# Patient Record
Sex: FEMALE | Race: WHITE | Employment: OTHER | URBAN - METROPOLITAN AREA
[De-identification: names, ages, dates, MRNs, and addresses within clinical notes are randomized per-mention and may not be internally consistent; named-entity substitution may affect disease eponyms.]

---

## 2018-08-21 ENCOUNTER — HOSPITAL ENCOUNTER (OUTPATIENT)
Age: 63
Discharge: HOME | End: 2018-08-21
Payer: COMMERCIAL

## 2018-08-21 LAB
A/G RATIO: 1.9 (ref 1.1–2.2)
ALBUMIN SERPL-MCNC: 4.3 G/DL (ref 3.4–5)
ALP BLD-CCNC: 92 U/L (ref 40–129)
ALT SERPL-CCNC: 11 U/L (ref 10–40)
ANION GAP SERPL CALCULATED.3IONS-SCNC: 13 MMOL/L (ref 3–16)
AST SERPL-CCNC: 17 U/L (ref 15–37)
BASOPHILS ABSOLUTE: 0 K/UL (ref 0–0.2)
BASOPHILS RELATIVE PERCENT: 0.5 %
BILIRUB SERPL-MCNC: <0.2 MG/DL (ref 0–1)
BUN BLDV-MCNC: 7 MG/DL (ref 7–20)
CALCIUM SERPL-MCNC: 9.3 MG/DL (ref 8.3–10.6)
CHLORIDE BLD-SCNC: 99 MMOL/L (ref 99–110)
CHOLESTEROL, TOTAL: 152 MG/DL (ref 0–199)
CO2: 25 MMOL/L (ref 21–32)
CREAT SERPL-MCNC: 0.7 MG/DL (ref 0.6–1.2)
EOSINOPHILS ABSOLUTE: 0.1 K/UL (ref 0–0.6)
EOSINOPHILS RELATIVE PERCENT: 1.2 %
GFR AFRICAN AMERICAN: >60
GFR NON-AFRICAN AMERICAN: >60
GLOBULIN: 2.3 G/DL
GLUCOSE BLD-MCNC: 98 MG/DL (ref 70–99)
HCT VFR BLD CALC: 37.9 % (ref 36–48)
HDLC SERPL-MCNC: 81 MG/DL (ref 40–60)
HEMOGLOBIN: 12.7 G/DL (ref 12–16)
LDL CHOLESTEROL CALCULATED: 52 MG/DL
LYMPHOCYTES ABSOLUTE: 1.7 K/UL (ref 1–5.1)
LYMPHOCYTES RELATIVE PERCENT: 21.9 %
MCH RBC QN AUTO: 29.7 PG (ref 26–34)
MCHC RBC AUTO-ENTMCNC: 33.5 G/DL (ref 31–36)
MCV RBC AUTO: 88.7 FL (ref 80–100)
MONOCYTES ABSOLUTE: 0.7 K/UL (ref 0–1.3)
MONOCYTES RELATIVE PERCENT: 9.6 %
NEUTROPHILS ABSOLUTE: 5.2 K/UL (ref 1.7–7.7)
NEUTROPHILS RELATIVE PERCENT: 66.8 %
PDW BLD-RTO: 15 % (ref 12.4–15.4)
PLATELET # BLD: 249 K/UL (ref 135–450)
PMV BLD AUTO: 8 FL (ref 5–10.5)
POTASSIUM SERPL-SCNC: 4.7 MMOL/L (ref 3.5–5.1)
RBC # BLD: 4.27 M/UL (ref 4–5.2)
SODIUM BLD-SCNC: 137 MMOL/L (ref 136–145)
T4 FREE: 1.1 NG/DL (ref 0.9–1.8)
TOTAL PROTEIN: 6.6 G/DL (ref 6.4–8.2)
TRIGL SERPL-MCNC: 93 MG/DL (ref 0–150)
TSH SERPL DL<=0.05 MIU/L-ACNC: 2.81 UIU/ML (ref 0.27–4.2)
VITAMIN D 25-HYDROXY: 24.8 NG/ML
VLDLC SERPL CALC-MCNC: 19 MG/DL
WBC # BLD: 7.7 K/UL (ref 4–11)

## 2018-08-21 PROCEDURE — 85025 COMPLETE CBC W/AUTO DIFF WBC: CPT

## 2018-08-21 PROCEDURE — 84443 ASSAY THYROID STIM HORMONE: CPT

## 2018-08-21 PROCEDURE — 82306 VITAMIN D 25 HYDROXY: CPT

## 2018-08-21 PROCEDURE — 80061 LIPID PANEL: CPT

## 2018-08-21 PROCEDURE — 84439 ASSAY OF FREE THYROXINE: CPT

## 2018-08-21 PROCEDURE — 83036 HEMOGLOBIN GLYCOSYLATED A1C: CPT

## 2018-08-21 PROCEDURE — 36415 COLL VENOUS BLD VENIPUNCTURE: CPT

## 2018-08-21 PROCEDURE — 80053 COMPREHEN METABOLIC PANEL: CPT

## 2018-08-22 LAB
ESTIMATED AVERAGE GLUCOSE: 114 MG/DL
HBA1C MFR BLD: 5.6 %

## 2018-09-01 ENCOUNTER — HOSPITAL ENCOUNTER (EMERGENCY)
Age: 63
Discharge: HOME | End: 2018-09-01
Attending: EMERGENCY MEDICINE
Payer: COMMERCIAL

## 2018-09-01 ENCOUNTER — APPOINTMENT (OUTPATIENT)
Dept: CT IMAGING | Age: 63
End: 2018-09-01
Payer: COMMERCIAL

## 2018-09-01 VITALS
BODY MASS INDEX: 19.16 KG/M2 | WEIGHT: 115 LBS | DIASTOLIC BLOOD PRESSURE: 70 MMHG | SYSTOLIC BLOOD PRESSURE: 140 MMHG | HEIGHT: 65 IN | TEMPERATURE: 98 F | OXYGEN SATURATION: 94 % | HEART RATE: 85 BPM | RESPIRATION RATE: 16 BRPM

## 2018-09-01 DIAGNOSIS — J44.1 COPD EXACERBATION (HCC): Primary | ICD-10-CM

## 2018-09-01 LAB
ANION GAP SERPL CALCULATED.3IONS-SCNC: 10 MMOL/L (ref 3–16)
BASOPHILS ABSOLUTE: 0.1 K/UL (ref 0–0.2)
BASOPHILS RELATIVE PERCENT: 1.4 %
BUN BLDV-MCNC: 9 MG/DL (ref 7–20)
CALCIUM SERPL-MCNC: 9 MG/DL (ref 8.3–10.6)
CHLORIDE BLD-SCNC: 99 MMOL/L (ref 99–110)
CO2: 28 MMOL/L (ref 21–32)
CREAT SERPL-MCNC: <0.5 MG/DL (ref 0.6–1.2)
EOSINOPHILS ABSOLUTE: 0.3 K/UL (ref 0–0.6)
EOSINOPHILS RELATIVE PERCENT: 3 %
GFR AFRICAN AMERICAN: >60
GFR NON-AFRICAN AMERICAN: >60
GLUCOSE BLD-MCNC: 103 MG/DL (ref 70–99)
HCT VFR BLD CALC: 39.9 % (ref 36–48)
HEMOGLOBIN: 13.5 G/DL (ref 12–16)
LYMPHOCYTES ABSOLUTE: 1.4 K/UL (ref 1–5.1)
LYMPHOCYTES RELATIVE PERCENT: 14.8 %
MAGNESIUM: 2.4 MG/DL (ref 1.8–2.4)
MCH RBC QN AUTO: 29.5 PG (ref 26–34)
MCHC RBC AUTO-ENTMCNC: 33.7 G/DL (ref 31–36)
MCV RBC AUTO: 87.7 FL (ref 80–100)
MONOCYTES ABSOLUTE: 0.7 K/UL (ref 0–1.3)
MONOCYTES RELATIVE PERCENT: 7.2 %
NEUTROPHILS ABSOLUTE: 6.8 K/UL (ref 1.7–7.7)
NEUTROPHILS RELATIVE PERCENT: 73.6 %
PDW BLD-RTO: 15.2 % (ref 12.4–15.4)
PLATELET # BLD: 271 K/UL (ref 135–450)
PMV BLD AUTO: 7 FL (ref 5–10.5)
POTASSIUM SERPL-SCNC: 5.1 MMOL/L (ref 3.5–5.1)
RBC # BLD: 4.56 M/UL (ref 4–5.2)
SODIUM BLD-SCNC: 137 MMOL/L (ref 136–145)
TOTAL CK: 83 U/L (ref 26–192)
TROPONIN: <0.01 NG/ML
WBC # BLD: 9.2 K/UL (ref 4–11)

## 2018-09-01 PROCEDURE — 99285 EMERGENCY DEPT VISIT HI MDM: CPT

## 2018-09-01 PROCEDURE — 93010 ELECTROCARDIOGRAM REPORT: CPT | Performed by: INTERNAL MEDICINE

## 2018-09-01 PROCEDURE — 6360000004 HC RX CONTRAST MEDICATION: Performed by: EMERGENCY MEDICINE

## 2018-09-01 PROCEDURE — 93005 ELECTROCARDIOGRAM TRACING: CPT | Performed by: EMERGENCY MEDICINE

## 2018-09-01 PROCEDURE — 80048 BASIC METABOLIC PNL TOTAL CA: CPT

## 2018-09-01 PROCEDURE — 82550 ASSAY OF CK (CPK): CPT

## 2018-09-01 PROCEDURE — 71260 CT THORAX DX C+: CPT

## 2018-09-01 PROCEDURE — 84484 ASSAY OF TROPONIN QUANT: CPT

## 2018-09-01 PROCEDURE — 83735 ASSAY OF MAGNESIUM: CPT

## 2018-09-01 PROCEDURE — 85025 COMPLETE CBC W/AUTO DIFF WBC: CPT

## 2018-09-01 RX ORDER — AZITHROMYCIN 250 MG/1
TABLET, FILM COATED ORAL
Qty: 1 PACKET | Refills: 0 | Status: SHIPPED | OUTPATIENT
Start: 2018-09-01 | End: 2018-09-11

## 2018-09-01 RX ORDER — ALBUTEROL SULFATE 90 UG/1
AEROSOL, METERED RESPIRATORY (INHALATION)
Qty: 1 INHALER | Refills: 0 | Status: SHIPPED | OUTPATIENT
Start: 2018-09-01

## 2018-09-01 RX ORDER — GABAPENTIN 300 MG/1
600 CAPSULE ORAL 3 TIMES DAILY
COMMUNITY

## 2018-09-01 RX ORDER — ESCITALOPRAM OXALATE 5 MG/1
5 TABLET ORAL EVERY 12 HOURS
Status: ON HOLD | COMMUNITY
End: 2020-07-06

## 2018-09-01 RX ORDER — MORPHINE SULFATE 30 MG/1
30 TABLET ORAL 2 TIMES DAILY
Status: ON HOLD | COMMUNITY
End: 2020-07-05

## 2018-09-01 RX ORDER — GUAIFENESIN 600 MG/1
1200 TABLET, EXTENDED RELEASE ORAL 2 TIMES DAILY
Qty: 20 TABLET | Refills: 0 | Status: SHIPPED | OUTPATIENT
Start: 2018-09-01 | End: 2018-09-06

## 2018-09-01 RX ADMIN — IOPAMIDOL 85 ML: 755 INJECTION, SOLUTION INTRAVENOUS at 09:19

## 2018-09-01 NOTE — ED PROVIDER NOTES
or more for level 5)     Review of Systems    Positives and Pertinent negatives as per HPI. Except as noted above in the ROS, all other systems were reviewed and negative. PAST MEDICAL HISTORY     Past Medical History:   Diagnosis Date    COPD (chronic obstructive pulmonary disease) ([address]     DDD (degenerative disc disease)     Fibromyalgia     Sciatica          SURGICAL HISTORY       Past Surgical History:   Procedure Laterality Date    CHOLECYSTECTOMY      HAND SURGERY      HYSTERECTOMY      KNEE SURGERY      TONSILLECTOMY           CURRENT MEDICATIONS       Previous Medications    ATORVASTATIN (LIPITOR) 40 MG TABLET    Take 40 mg by mouth daily. BACLOFEN (LIORESAL) 10 MG TABLET    Take 10 mg by mouth 3 times daily. DIAZEPAM (VALIUM) 10 MG TABLET    Take 10 mg by mouth every 6 hours as needed for Anxiety. ESCITALOPRAM (LEXAPRO) 5 MG TABLET    Take 10 mg by mouth daily    ESOMEPRAZOLE MAGNESIUM (NEXIUM) 40 MG PACK    Take 40 mg by mouth daily. ESTROPIPATE (OGEN) 1.5 MG TABLET    Take 1.5 mg by mouth daily. FLUTICASONE-SALMETEROL (ADVAIR) 250-50 MCG/DOSE AEPB    Inhale 1 puff into the lungs every 12 hours. GABAPENTIN (NEURONTIN) 300 MG CAPSULE    Take 300 mg by mouth 2 times daily. .    MORPHINE (MSIR) 30 MG TABLET    Take 30 mg by mouth 2 times daily. .    OXYCODONE 5 MG CAPSULE    Take 5 mg by mouth every 6 hours as needed for Pain. PROMETHAZINE (PHENERGAN) 25 MG TABLET    Take 25 mg by mouth every 6 hours as needed for Nausea. ZAFIRLUKAST (ACCOLATE) 20 MG TABLET    Take 20 mg by mouth 2 times daily. ALLERGIES     Patient has no known allergies. FAMILY HISTORY     History reviewed. No pertinent family history.        SOCIAL HISTORY       Social History     Social History    Marital status: Single     Spouse name: N/A    Number of children: N/A    Years of education: N/A     Social History Main Topics    Smoking status: Never Smoker    Smokeless tobacco: Never Used  Alcohol use No    Drug use: No    Sexual activity: Not Asked     Other Topics Concern    None     Social History Narrative    None       SCREENINGS             PHYSICAL EXAM    (up to 7 for level 4, 8 or more for level 5)     ED Triage Vitals [09/01/18 0806]   BP Temp Temp src Pulse Resp SpO2 Height Weight   138/81 98 °F (36.7 °C) -- 85 20 93 % 5' 5\" (1.651 m) 115 lb (52.2 kg)       Physical Exam       General Appearance:  Alert, cooperative, no distress, non-toxic, Thin, frail appearing, does have dysphonia due to a sore throat    Head:  Normocephalic, without obvious abnormality, atraumatic,    Eyes:  conjunctiva/corneas clear, EOM's intact. Sclera anicteric. ENT: Mucous membranes moist.  No stridor. There is no oropharyngeal erythema, exudate or lesion. Clear mucoid discharge coming out of both nares. Neck: Supple, symmetrical, trachea midline, no adenopathy. No rigidity   Lungs:   No Respiratory Distress, But does have some conversational dyspnea. Markedly decreased breath sounds noted bilaterally, no crackles, wheezing or rhonchi at this time    Chest Wall:  symmetrical   Heart:  S1 and S2 regular rate and rhythm without murmur, rub or gallop    Abdomen:   Soft nontender bowel sounds are present in all quadrants    Extremities:  Full range of motion. No pain noted over any major bone or joint, no edema or cyanosis or clubbing. No calf tenderness    Pulses: Equal and symmetric    Skin:  No rashes or lesions to exposed skin.   Cap refill normal   Neurologic: Alert and oriented X 3.   CN 2-12 intact, strength and sensation symmetrical, reflexes intact bilaterally, no focal deficits, cerebellar function intact       DIAGNOSTIC RESULTS   LABS:    Labs Reviewed   BASIC METABOLIC PANEL - Abnormal; Notable for the following:        Result Value    Glucose 103 (*)     CREATININE <0.5 (*)     All other components within normal limits    Narrative:     Performed at:  [facility] exacerbation [facility])          DISPOSITION/PLAN   DISPOSITION Decision To Discharge 09/01/2018 09:50:32 AM      PATIENT REFERRED TO:  Lisa Jordan MD  [facility]  [address] [facility] [place] [id number] [phone]    Schedule an appointment as soon as possible for a visit       [facility] ED  [address] [facility]  [phone]  In 1 day  As needed, If symptoms worsen      DISCHARGE MEDICATIONS:  New Prescriptions    ALBUTEROL SULFATE  (90 BASE) MCG/ACT INHALER    Use 2 puffs 4 times daily for 7 days then as needed for wheezing. Dispense with Spacer and instruct in use. At patient's preference may use 60 dose MDI. May Sub Pro-Air or Proventil as needed per insurance. AZITHROMYCIN (ZITHROMAX) 250 MG TABLET    Take 2 tablets (500 mg) on Day 1, followed by 1 tablet (250 mg) once daily on Days 2 through 5. GUAIFENESIN (MUCINEX) 600 MG EXTENDED RELEASE TABLET    Take 2 tablets by mouth 2 times daily for 5 days       DISCONTINUED MEDICATIONS:  Discontinued Medications    HYDROCODONE-ACETAMINOPHEN  MG TABS    Take 1 tablet by mouth 3 times daily. METHOCARBAMOL (ROBAXIN) 750 MG TABLET    Take 750 mg by mouth 3 times daily. TOPIRAMATE (TOPAMAX) 100 MG TABLET    Take 100 mg by mouth 2 times daily.               (Please note that portions of this note were completed with a voice recognition program.  Efforts were made to edit the dictations but occasionally words are mis-transcribed.)    Karma Wright DO (electronically signed)            Mitch Ruano DO  09/01/18 0003

## 2018-09-05 LAB
EKG ATRIAL RATE: 75 BPM
EKG DIAGNOSIS: NORMAL
EKG P AXIS: 78 DEGREES
EKG P-R INTERVAL: 120 MS
EKG Q-T INTERVAL: 392 MS
EKG QRS DURATION: 82 MS
EKG QTC CALCULATION (BAZETT): 437 MS
EKG R AXIS: 85 DEGREES
EKG T AXIS: 77 DEGREES
EKG VENTRICULAR RATE: 75 BPM

## 2018-09-20 ENCOUNTER — HOSPITAL ENCOUNTER (OUTPATIENT)
Dept: NON INVASIVE DIAGNOSTICS | Age: 63
Discharge: HOME | End: 2018-09-20
Payer: COMMERCIAL

## 2018-09-20 ENCOUNTER — HOSPITAL ENCOUNTER (OUTPATIENT)
Age: 63
Discharge: HOME | End: 2018-09-20
Payer: COMMERCIAL

## 2018-09-20 LAB
C-REACTIVE PROTEIN: 0.9 MG/L (ref 0–5.1)
EKG ATRIAL RATE: 65 BPM
EKG DIAGNOSIS: NORMAL
EKG P AXIS: 73 DEGREES
EKG P-R INTERVAL: 130 MS
EKG Q-T INTERVAL: 402 MS
EKG QRS DURATION: 96 MS
EKG QTC CALCULATION (BAZETT): 418 MS
EKG R AXIS: 80 DEGREES
EKG T AXIS: 72 DEGREES
EKG VENTRICULAR RATE: 65 BPM
LV EF: 55 %
LVEF MODALITY: NORMAL
RHEUMATOID FACTOR: <10 IU/ML
SEDIMENTATION RATE, ERYTHROCYTE: 10 MM/HR (ref 0–30)

## 2018-09-20 PROCEDURE — 85652 RBC SED RATE AUTOMATED: CPT

## 2018-09-20 PROCEDURE — 86200 CCP ANTIBODY: CPT

## 2018-09-20 PROCEDURE — 93005 ELECTROCARDIOGRAM TRACING: CPT | Performed by: INTERNAL MEDICINE

## 2018-09-20 PROCEDURE — 93306 TTE W/DOPPLER COMPLETE: CPT

## 2018-09-20 PROCEDURE — 86140 C-REACTIVE PROTEIN: CPT

## 2018-09-20 PROCEDURE — 93010 ELECTROCARDIOGRAM REPORT: CPT | Performed by: INTERNAL MEDICINE

## 2018-09-20 PROCEDURE — 83516 IMMUNOASSAY NONANTIBODY: CPT

## 2018-09-20 PROCEDURE — 86431 RHEUMATOID FACTOR QUANT: CPT

## 2018-09-20 PROCEDURE — 36415 COLL VENOUS BLD VENIPUNCTURE: CPT

## 2018-09-20 PROCEDURE — 86038 ANTINUCLEAR ANTIBODIES: CPT

## 2018-09-21 LAB
ANA INTERPRETATION: NORMAL
ANTI-NUCLEAR ANTIBODY (ANA): NEGATIVE

## 2018-09-22 LAB — CCP IGG ANTIBODIES: 5 UNITS (ref 0–19)

## 2018-09-23 LAB
MYELOPEROXIDASE AB: 0 AU/ML (ref 0–19)
SERINE PROTEASE 3 AB: 5 AU/ML (ref 0–19)

## 2018-11-18 ENCOUNTER — HOSPITAL ENCOUNTER (EMERGENCY)
Age: 63
Discharge: HOME | End: 2018-11-18
Attending: EMERGENCY MEDICINE
Payer: COMMERCIAL

## 2018-11-18 VITALS
SYSTOLIC BLOOD PRESSURE: 155 MMHG | TEMPERATURE: 98 F | OXYGEN SATURATION: 95 % | BODY MASS INDEX: 19.16 KG/M2 | HEART RATE: 77 BPM | WEIGHT: 115 LBS | HEIGHT: 65 IN | RESPIRATION RATE: 14 BRPM | DIASTOLIC BLOOD PRESSURE: 94 MMHG

## 2018-11-18 DIAGNOSIS — G89.29 CHRONIC PAIN OF BOTH KNEES: Primary | ICD-10-CM

## 2018-11-18 DIAGNOSIS — M25.562 CHRONIC PAIN OF BOTH KNEES: Primary | ICD-10-CM

## 2018-11-18 DIAGNOSIS — M25.561 CHRONIC PAIN OF BOTH KNEES: Primary | ICD-10-CM

## 2018-11-18 PROCEDURE — 96372 THER/PROPH/DIAG INJ SC/IM: CPT

## 2018-11-18 PROCEDURE — 6360000002 HC RX W HCPCS: Performed by: EMERGENCY MEDICINE

## 2018-11-18 PROCEDURE — 99283 EMERGENCY DEPT VISIT LOW MDM: CPT

## 2018-11-18 RX ORDER — HYDROMORPHONE HCL 110MG/55ML
2 PATIENT CONTROLLED ANALGESIA SYRINGE INTRAVENOUS ONCE
Status: COMPLETED | OUTPATIENT
Start: 2018-11-18 | End: 2018-11-18

## 2018-11-18 RX ADMIN — HYDROMORPHONE HYDROCHLORIDE 2 MG: 2 INJECTION INTRAMUSCULAR; INTRAVENOUS; SUBCUTANEOUS at 04:06

## 2018-11-18 ASSESSMENT — PAIN SCALES - GENERAL
PAINLEVEL_OUTOF10: 2
PAINLEVEL_OUTOF10: 10

## 2018-11-18 ASSESSMENT — PAIN DESCRIPTION - LOCATION: LOCATION: KNEE

## 2018-11-18 ASSESSMENT — PAIN DESCRIPTION - ORIENTATION: ORIENTATION: RIGHT;LEFT

## 2018-11-18 NOTE — ED PROVIDER NOTES
[address] ED  eMERGENCY dEPARTMENT eNCOUnter        Pt Name: Brooklyn Ang  MRN: [id number]  [place] 1955  Date of evaluation: 11/18/2018  Provider: Kiet Kebede DO  PCP: Davidson Dejesus MD      [address] [place] [address]       Chief Complaint   Patient presents with    Knee Pain     pain in bilateral knees after steroid epidural injection. HISTORY OF PRESENT ILLNESS   (Location/Symptom, Timing/Onset, Context/Setting, Quality, Duration, Modifying Factors, Severity)  Note limiting factors. Brooklyn Ang is a 65 y.o. female  presents to the emergency department with complaint of Acute exacerbation of bilateral chronic knee pain. Patient has a history of bilateral knee replacement due to distant trauma. She has not had any fever or chills. She denies any redness or swelling. She has been able ambulate. The pain started acutely at around midnight and is nonradiating. She has been compliant with her chronic opiate therapy which she has been on for the past approximately 6 months. There is not been a recent change in medication. She did have recent epidural injection. She denies any significant back pain or pain in her buttocks. She has had no urinary retention problems with bowel. Nursing Notes were all reviewed and agreed with or any disagreements were addressed  in the HPI.     REVIEW OF SYSTEMS    (2-9 systems for level 4, 10 or more for level 5)     Review of Systems  REVIEW OF SYSTEMS    Constitutional:  Denies fever, chills, weight loss or weakness   Eyes:  Denies photophobia or discharge   HENT:  Denies sore throat or ear pain   Respiratory:  Denies cough or shortness of breath   Cardiovascular:  Denies chest pain, palpitations or swelling   GI:  Denies abdominal pain, nausea, vomiting, or diarrhea   Musculoskeletal:  Denies back pain   Skin:  Denies rash   Neurologic:  Denies headache, focal weakness or sensory changes   Endocrine:  Denies polyuria surgeon for further evaluation of the joint replacements. She was ambulatory in the ED. She had full range of motion. There was no evidence of arterial or venous insufficiency on exam.  There was no back pain or concerning sciatic symptoms. Patient was given the following medications:  Medications   HYDROmorphone (DILAUDID) injection 2 mg (2 mg Intramuscular Given 11/18/18 0406)       The patient tolerated their visit well. The patient and / or the family were informed of the results of any tests, a time was given to answer questions. FINAL IMPRESSION      1.  Chronic pain of both knees          DISPOSITION/PLAN   DISPOSITION Decision To Discharge 11/18/2018 04:15:46 AM      PATIENT REFERRED TO:  Gene Huang MD  [address]  [address] [facility] [address]  [phone]      As needed      DISCHARGE MEDICATIONS:  Current Discharge Medication List          DISCONTINUED MEDICATIONS:  Current Discharge Medication List                 (Please note that portions of this note were completed with a voice recognition program.  Efforts were made to edit the dictations but occasionally words are mis-transcribed.)    Zulema Carrillo DO (electronically signed)           Zulema Carrillo DO  11/18/18 5422

## 2019-02-23 ENCOUNTER — HOSPITAL ENCOUNTER (OUTPATIENT)
Dept: MRI IMAGING | Age: 64
Discharge: HOME | End: 2019-02-23
Payer: MEDICARE

## 2019-02-23 ENCOUNTER — HOSPITAL ENCOUNTER (OUTPATIENT)
Age: 64
Discharge: HOME | End: 2019-02-23
Payer: MEDICARE

## 2019-02-23 ENCOUNTER — HOSPITAL ENCOUNTER (OUTPATIENT)
Dept: GENERAL RADIOLOGY | Age: 64
Discharge: HOME | End: 2019-02-23
Payer: MEDICARE

## 2019-02-23 DIAGNOSIS — M47.817 SPONDYLOSIS WITHOUT MYELOPATHY OR RADICULOPATHY, LUMBOSACRAL REGION: ICD-10-CM

## 2019-02-23 DIAGNOSIS — M16.0 OSTEOARTHRITIS OF BOTH HIPS, UNSPECIFIED OSTEOARTHRITIS TYPE: ICD-10-CM

## 2019-02-23 PROCEDURE — 72148 MRI LUMBAR SPINE W/O DYE: CPT

## 2019-02-23 PROCEDURE — 73521 X-RAY EXAM HIPS BI 2 VIEWS: CPT

## 2019-04-08 ENCOUNTER — HOSPITAL ENCOUNTER (EMERGENCY)
Age: 64
Discharge: HOME | End: 2019-04-08
Payer: MEDICARE

## 2019-04-08 ENCOUNTER — APPOINTMENT (OUTPATIENT)
Dept: GENERAL RADIOLOGY | Age: 64
End: 2019-04-08
Payer: MEDICARE

## 2019-04-08 VITALS
RESPIRATION RATE: 20 BRPM | OXYGEN SATURATION: 94 % | HEIGHT: 65 IN | TEMPERATURE: 97.9 F | SYSTOLIC BLOOD PRESSURE: 119 MMHG | DIASTOLIC BLOOD PRESSURE: 80 MMHG | BODY MASS INDEX: 21.33 KG/M2 | HEART RATE: 78 BPM | WEIGHT: 128 LBS

## 2019-04-08 DIAGNOSIS — R13.10 DYSPHAGIA, UNSPECIFIED TYPE: ICD-10-CM

## 2019-04-08 DIAGNOSIS — J18.9 PNEUMONIA DUE TO ORGANISM: Primary | ICD-10-CM

## 2019-04-08 LAB
A/G RATIO: 1.2 (ref 1.1–2.2)
ALBUMIN SERPL-MCNC: 4.1 G/DL (ref 3.4–5)
ALP BLD-CCNC: 144 U/L (ref 40–129)
ALT SERPL-CCNC: 21 U/L (ref 10–40)
ANION GAP SERPL CALCULATED.3IONS-SCNC: 14 MMOL/L (ref 3–16)
AST SERPL-CCNC: 25 U/L (ref 15–37)
BASOPHILS ABSOLUTE: 0.1 K/UL (ref 0–0.2)
BASOPHILS RELATIVE PERCENT: 0.8 %
BILIRUB SERPL-MCNC: 0.3 MG/DL (ref 0–1)
BUN BLDV-MCNC: 8 MG/DL (ref 7–20)
CALCIUM SERPL-MCNC: 9.5 MG/DL (ref 8.3–10.6)
CHLORIDE BLD-SCNC: 90 MMOL/L (ref 99–110)
CO2: 24 MMOL/L (ref 21–32)
CREAT SERPL-MCNC: <0.5 MG/DL (ref 0.6–1.2)
EKG ATRIAL RATE: 74 BPM
EKG DIAGNOSIS: NORMAL
EKG P AXIS: 76 DEGREES
EKG P-R INTERVAL: 140 MS
EKG Q-T INTERVAL: 376 MS
EKG QRS DURATION: 84 MS
EKG QTC CALCULATION (BAZETT): 417 MS
EKG R AXIS: 77 DEGREES
EKG T AXIS: 73 DEGREES
EKG VENTRICULAR RATE: 74 BPM
EOSINOPHILS ABSOLUTE: 0.2 K/UL (ref 0–0.6)
EOSINOPHILS RELATIVE PERCENT: 1.8 %
GFR AFRICAN AMERICAN: >60
GFR NON-AFRICAN AMERICAN: >60
GLOBULIN: 3.4 G/DL
GLUCOSE BLD-MCNC: 102 MG/DL (ref 70–99)
HCT VFR BLD CALC: 40.2 % (ref 36–48)
HEMOGLOBIN: 13.7 G/DL (ref 12–16)
LIPASE: 13 U/L (ref 13–60)
LYMPHOCYTES ABSOLUTE: 1 K/UL (ref 1–5.1)
LYMPHOCYTES RELATIVE PERCENT: 11.6 %
MCH RBC QN AUTO: 29 PG (ref 26–34)
MCHC RBC AUTO-ENTMCNC: 34 G/DL (ref 31–36)
MCV RBC AUTO: 85.3 FL (ref 80–100)
MONOCYTES ABSOLUTE: 0.7 K/UL (ref 0–1.3)
MONOCYTES RELATIVE PERCENT: 7.6 %
NEUTROPHILS ABSOLUTE: 7 K/UL (ref 1.7–7.7)
NEUTROPHILS RELATIVE PERCENT: 78.2 %
PDW BLD-RTO: 14.4 % (ref 12.4–15.4)
PLATELET # BLD: 444 K/UL (ref 135–450)
PMV BLD AUTO: 6.6 FL (ref 5–10.5)
POTASSIUM REFLEX MAGNESIUM: 5.2 MMOL/L (ref 3.5–5.1)
RBC # BLD: 4.71 M/UL (ref 4–5.2)
SODIUM BLD-SCNC: 128 MMOL/L (ref 136–145)
TOTAL PROTEIN: 7.5 G/DL (ref 6.4–8.2)
TROPONIN: <0.01 NG/ML
WBC # BLD: 8.9 K/UL (ref 4–11)

## 2019-04-08 PROCEDURE — 80053 COMPREHEN METABOLIC PANEL: CPT

## 2019-04-08 PROCEDURE — 83690 ASSAY OF LIPASE: CPT

## 2019-04-08 PROCEDURE — 84484 ASSAY OF TROPONIN QUANT: CPT

## 2019-04-08 PROCEDURE — 71046 X-RAY EXAM CHEST 2 VIEWS: CPT

## 2019-04-08 PROCEDURE — 99284 EMERGENCY DEPT VISIT MOD MDM: CPT

## 2019-04-08 PROCEDURE — 85025 COMPLETE CBC W/AUTO DIFF WBC: CPT

## 2019-04-08 PROCEDURE — 93005 ELECTROCARDIOGRAM TRACING: CPT | Performed by: PHYSICIAN ASSISTANT

## 2019-04-08 PROCEDURE — 93010 ELECTROCARDIOGRAM REPORT: CPT | Performed by: INTERNAL MEDICINE

## 2019-04-08 RX ORDER — DOXYCYCLINE 100 MG/1
100 TABLET ORAL 2 TIMES DAILY
Qty: 20 TABLET | Refills: 0 | Status: SHIPPED | OUTPATIENT
Start: 2019-04-08 | End: 2019-04-18

## 2019-04-08 NOTE — ED PROVIDER NOTES
EKG shows normal sinus rhythm at 74 bpm there is left atrial enlargement. No other acute changes no STEMI no ectopy. This is a otherwise normal EKG. In comparison to an EKG of 9/20/18. EKG is relatively unchanged. Itzel Colorado MD  04/08/19 5571    I independently examined and evaluated Jackie Stokes. In brief, 59-year-old female with feeling like a lump in her throat when she swallows    Focused exam revealed she is awake, alert, oriented ×3, slightly dehydrated appearing but in no acute distress. Cardiac is regular rate and rhythm, few scattered wheezes appreciated. She has some rales at the left base. Good air exchange. Right ear has some erythema around the pinnae. No open wounds or discharge. It is tender to movement. Tympanic membranes are within normal limits. Chest x-ray does show a possible early infiltrate at the left base. We will start her on appropriate antibiotics and will also cover her otitis externa. I have encouraged her to stop smoking in light of her swallowing sensation. I will have her follow up with GI. She can return at any time if worse or problems and should follow-up with primary care. Impression swallowing difficulty. Right otitis media  Left pneumonia        All diagnostic, treatment, and disposition decisions were made by myself in conjunction with the advanced practice provider. For all further details of the patient's emergency department visit, please see the advanced practice provider's documentation. Comment: Please note this report has been produced using speech recognition software and may contain errors related to that system including errors in grammar, punctuation, and spelling, as well as words and phrases that may be inappropriate. If there are any questions or concerns please feel free to contact the dictating provider for clarification. Itzel Colorado MD  04/08/19 9687

## 2019-04-08 NOTE — ED PROVIDER NOTES
[address] [facility]  [address] [id number]  Dept: [phone]  Loc: [address] [facility] ENCOUNTER    Evaluated by the Advanced Practice Provider  This patient was seen and evaluated by the attending physician Dr Shahid Eaton. CHIEF COMPLAINT    Chief Complaint   Patient presents with    Abdominal Pain     C/o difficulty swallowing and abdominal spasms x6 days. States \"when I swallow it feels like when I swallow it gets stuck. \" Reports fever and chills night. HPI    Tracee Marinelli is a [address] [facility] y.o. female who presents with abdominal pain localized in the upper of the abdomen. Onset was 6 days ago. The duration has been intermittent since the onset. The pain is associated with difficulty swallowing and abdominal cramping. The quality of the pain is sharp and cramping. The context is that the symptoms started spontaneously. There are no alleviating factors. She states that she has never had anything like this before. She states that it is painful when she swallows food, but she is able to get down. She states that she has no chest pain, shortness of breath, vomiting, but she does have intermittent nausea. She denies any change in her bowels or bladder habits. She does have a history of reflux, but has been on Nexium for the last 20-30 years. She has no further complaints at this time. REVIEW OF SYSTEMS    GI: see HPI, no vomiting, no diarrhea, no hematochezia  Cardiac: No chest pain, shortness of breath, palpitations or syncope  Pulmonary: No difficulty breathing or new cough  General: No fevers  : No dysuria, No hematuria  See HPI for further details. All other systems reviewed and are negative.     PAST MEDICAL & SURGICAL HISTORY    Past Medical History:   Diagnosis Date    [facility] (chronic obstructive pulmonary disease) [address]     DDD (degenerative disc disease)     Fibromyalgia     Sciatica      Past Surgical Occupational History    None   Social Needs    Financial resource strain: None    Food insecurity:     Worry: None     Inability: None    Transportation needs:     Medical: None     Non-medical: None   Tobacco Use    Smoking status: Current Every Day Smoker     Packs/day: 0.50     Types: Cigarettes    Smokeless tobacco: Never Used   Substance and Sexual Activity    Alcohol use: No    Drug use: No    Sexual activity: None   Lifestyle    Physical activity:     Days per week: None     Minutes per session: None    Stress: None   Relationships    Social connections:     Talks on phone: None     Gets together: None     Attends Restoration service: None     Active member of club or organization: None     Attends meetings of clubs or organizations: None     Relationship status: None    Intimate partner violence:     Fear of current or ex partner: None     Emotionally abused: None     Physically abused: None     Forced sexual activity: None   Other Topics Concern    None   Social History Narrative    None     History reviewed. No pertinent family history.     PHYSICAL EXAM    VITAL SIGNS: /80   Pulse 78   Temp 97.9 °F (36.6 °C) (Tympanic)   Resp 20   Ht 5' 5\" (1.651 m)   Wt 128 lb (58.1 kg)   SpO2 94%   BMI 21.30 kg/m²   Constitutional:  Well developed, well nourished, no acute distress  Eyes:  Sclera nonicteric, conjunctiva moist  HENT:  Atraumatic, nose normal  Neck: Supple, no JVD  Respiratory:  No retractions, no accessory muscle use, normal breath sounds   Cardiovascular:  regular rate, no murmurs  GI: no abdominal tenderness to palpation, soft, no guarding, bowel sounds present, no audible bruits or palpable pulsatile masses  Back: no CVA tenderness  Musculoskeletal:  No edema, no acute deformities  Vascular: Radial and DP pulses 2+ and equal bilaterally  Integument: No rashes, skin dry  Neurologic:  Alert & oriented, no slurred speech  Psychiatric: Cooperative, pleasant affect     EKG Please see the ED Physician note for EKG interpretation. RADIOLOGY/PROCEDURES    XR CHEST STANDARD (2 VW)   Final Result   Focal airspace opacity within the lingula, representing either atelectasis or   pneumonia. ED COURSE & MEDICAL DECISION MAKING    Pertinent Labs & Imaging studies reviewed and interpreted. (See chart for details)     See chart for details of medications given during the ED stay. Vitals:    04/08/19 1221 04/08/19 1245 04/08/19 1303 04/08/19 1333   BP: 128/67 133/80 127/80 119/80   Pulse: 73 79 76 78   Resp: 13 11 13 20   Temp:       TempSrc:       SpO2: 93% 96% 94% 94%   Weight:       Height:           Differential diagnosis: Abdominal Aortic Aneurysm, Acute Coronary Syndrome, Ischemic Bowel, Bowel Obstruction (including Gastric Outlet Obstruction), PUD, GERD, Acute Cholecystitis, Pancreatitis, Hepatitis, Colitis, SMA Syndrome, Mesenteric Steal Syndrome, Splanchnic Vein Thrombosis, other    Patient is afebrile and nontoxic in appearance. Labs reveal no leukocytosis or anemia. Metabolic panel with sodium of 128, hemolyzed potassium is 5.2, glucose of 102. Lipase within normal limits. Her troponin is less than 0.01. CXR  findings as above show focal airspace opacity within the lingula that represents either atelectasis or pneumonia . Given her discomfort, she will be treated with doxycycline for possible pneumonia, and she is given referral to GI to arrange for evaluation for endoscopy. The patient was instructed to follow up as an outpatient in 2 days. The patient was instructed to return to the ED immediately for any new or worsening symptoms. The patient verbalized understanding. I have evaluated this patient. My supervising physician was available for consultation. FINAL IMPRESSION    1. Pneumonia due to organism    2.  Dysphagia, unspecified type        PLAN  Discharge with outpatient follow-up    (Please note that this note was completed with a voice recognition program.  Every attempt was made to edit the dictations, but inevitably there remain words that are mis-transcribed.)          Mario Leighma  04/08/19 7912

## 2019-07-10 ENCOUNTER — APPOINTMENT (OUTPATIENT)
Dept: CT IMAGING | Age: 64
End: 2019-07-10
Payer: MEDICARE

## 2019-07-10 ENCOUNTER — HOSPITAL ENCOUNTER (EMERGENCY)
Age: 64
Discharge: HOME | End: 2019-07-10
Attending: EMERGENCY MEDICINE
Payer: MEDICARE

## 2019-07-10 ENCOUNTER — HOSPITAL ENCOUNTER (OUTPATIENT)
Dept: MRI IMAGING | Age: 64
Discharge: HOME | End: 2019-07-10
Payer: MEDICARE

## 2019-07-10 ENCOUNTER — APPOINTMENT (OUTPATIENT)
Dept: GENERAL RADIOLOGY | Age: 64
End: 2019-07-10
Payer: MEDICARE

## 2019-07-10 VITALS
SYSTOLIC BLOOD PRESSURE: 110 MMHG | OXYGEN SATURATION: 92 % | WEIGHT: 128 LBS | TEMPERATURE: 97.5 F | DIASTOLIC BLOOD PRESSURE: 76 MMHG | HEIGHT: 65 IN | RESPIRATION RATE: 14 BRPM | BODY MASS INDEX: 21.33 KG/M2 | HEART RATE: 75 BPM

## 2019-07-10 DIAGNOSIS — R42 DIZZINESS: Primary | ICD-10-CM

## 2019-07-10 DIAGNOSIS — M16.11 ARTHRITIS OF RIGHT HIP: ICD-10-CM

## 2019-07-10 LAB
A/G RATIO: 1.6 (ref 1.1–2.2)
ALBUMIN SERPL-MCNC: 4.3 G/DL (ref 3.4–5)
ALP BLD-CCNC: 115 U/L (ref 40–129)
ALT SERPL-CCNC: 6 U/L (ref 10–40)
ANION GAP SERPL CALCULATED.3IONS-SCNC: 12 MMOL/L (ref 3–16)
APTT: 37.8 SEC (ref 26–36)
AST SERPL-CCNC: 12 U/L (ref 15–37)
BASOPHILS ABSOLUTE: 0 K/UL (ref 0–0.2)
BASOPHILS RELATIVE PERCENT: 0.5 %
BILIRUB SERPL-MCNC: 0.3 MG/DL (ref 0–1)
BILIRUBIN URINE: ABNORMAL
BLOOD, URINE: ABNORMAL
BUN BLDV-MCNC: 12 MG/DL (ref 7–20)
CALCIUM SERPL-MCNC: 9.4 MG/DL (ref 8.3–10.6)
CHLORIDE BLD-SCNC: 104 MMOL/L (ref 99–110)
CHP ED QC CHECK: YES
CLARITY: ABNORMAL
CO2: 22 MMOL/L (ref 21–32)
COLOR: YELLOW
CREAT SERPL-MCNC: 1.1 MG/DL (ref 0.6–1.2)
EOSINOPHILS ABSOLUTE: 0.2 K/UL (ref 0–0.6)
EOSINOPHILS RELATIVE PERCENT: 2.9 %
EPITHELIAL CELLS, UA: NORMAL /HPF
GFR AFRICAN AMERICAN: >60
GFR NON-AFRICAN AMERICAN: 50
GLOBULIN: 2.7 G/DL
GLUCOSE BLD-MCNC: 106 MG/DL (ref 70–99)
GLUCOSE BLD-MCNC: 123 MG/DL
GLUCOSE BLD-MCNC: 123 MG/DL (ref 70–99)
GLUCOSE URINE: NEGATIVE MG/DL
HCT VFR BLD CALC: 37.1 % (ref 36–48)
HEMOGLOBIN: 12.3 G/DL (ref 12–16)
INR BLD: 1.05 (ref 0.86–1.14)
KETONES, URINE: ABNORMAL MG/DL
LEUKOCYTE ESTERASE, URINE: NEGATIVE
LYMPHOCYTES ABSOLUTE: 1.8 K/UL (ref 1–5.1)
LYMPHOCYTES RELATIVE PERCENT: 21 %
MCH RBC QN AUTO: 30 PG (ref 26–34)
MCHC RBC AUTO-ENTMCNC: 33.3 G/DL (ref 31–36)
MCV RBC AUTO: 90.1 FL (ref 80–100)
MICROSCOPIC EXAMINATION: YES
MONOCYTES ABSOLUTE: 1 K/UL (ref 0–1.3)
MONOCYTES RELATIVE PERCENT: 12.1 %
NEUTROPHILS ABSOLUTE: 5.4 K/UL (ref 1.7–7.7)
NEUTROPHILS RELATIVE PERCENT: 63.5 %
NITRITE, URINE: NEGATIVE
PDW BLD-RTO: 15.3 % (ref 12.4–15.4)
PERFORMED ON: ABNORMAL
PH UA: 6 (ref 5–8)
PLATELET # BLD: 321 K/UL (ref 135–450)
PMV BLD AUTO: 7 FL (ref 5–10.5)
POTASSIUM REFLEX MAGNESIUM: 4.9 MMOL/L (ref 3.5–5.1)
PRO-BNP: 112 PG/ML (ref 0–124)
PROTEIN UA: ABNORMAL MG/DL
PROTHROMBIN TIME: 12 SEC (ref 9.8–13)
RBC # BLD: 4.11 M/UL (ref 4–5.2)
RBC UA: NORMAL /HPF (ref 0–2)
SODIUM BLD-SCNC: 138 MMOL/L (ref 136–145)
SPECIFIC GRAVITY UA: 1.02 (ref 1–1.03)
TOTAL PROTEIN: 7 G/DL (ref 6.4–8.2)
TROPONIN: <0.01 NG/ML
URINE REFLEX TO CULTURE: ABNORMAL
URINE TYPE: ABNORMAL
UROBILINOGEN, URINE: 0.2 E.U./DL
WBC # BLD: 8.5 K/UL (ref 4–11)
WBC UA: NORMAL /HPF (ref 0–5)

## 2019-07-10 PROCEDURE — 85730 THROMBOPLASTIN TIME PARTIAL: CPT

## 2019-07-10 PROCEDURE — 81001 URINALYSIS AUTO W/SCOPE: CPT

## 2019-07-10 PROCEDURE — 70450 CT HEAD/BRAIN W/O DYE: CPT

## 2019-07-10 PROCEDURE — 84484 ASSAY OF TROPONIN QUANT: CPT

## 2019-07-10 PROCEDURE — 80053 COMPREHEN METABOLIC PANEL: CPT

## 2019-07-10 PROCEDURE — 71045 X-RAY EXAM CHEST 1 VIEW: CPT

## 2019-07-10 PROCEDURE — 73721 MRI JNT OF LWR EXTRE W/O DYE: CPT

## 2019-07-10 PROCEDURE — 85610 PROTHROMBIN TIME: CPT

## 2019-07-10 PROCEDURE — 83880 ASSAY OF NATRIURETIC PEPTIDE: CPT

## 2019-07-10 PROCEDURE — 93005 ELECTROCARDIOGRAM TRACING: CPT | Performed by: EMERGENCY MEDICINE

## 2019-07-10 PROCEDURE — 85025 COMPLETE CBC W/AUTO DIFF WBC: CPT

## 2019-07-10 PROCEDURE — 99284 EMERGENCY DEPT VISIT MOD MDM: CPT

## 2019-07-10 ASSESSMENT — PAIN SCALES - GENERAL: PAINLEVEL_OUTOF10: 7

## 2019-07-10 ASSESSMENT — PAIN DESCRIPTION - LOCATION: LOCATION: GENERALIZED;HEAD

## 2019-07-10 ASSESSMENT — PAIN DESCRIPTION - FREQUENCY: FREQUENCY: CONTINUOUS

## 2019-07-10 ASSESSMENT — PAIN DESCRIPTION - DESCRIPTORS: DESCRIPTORS: ACHING

## 2019-07-10 ASSESSMENT — PAIN DESCRIPTION - PROGRESSION: CLINICAL_PROGRESSION: GRADUALLY WORSENING

## 2019-07-10 ASSESSMENT — PAIN DESCRIPTION - ONSET: ONSET: GRADUAL

## 2019-07-11 LAB
EKG ATRIAL RATE: 77 BPM
EKG DIAGNOSIS: NORMAL
EKG P AXIS: 75 DEGREES
EKG P-R INTERVAL: 122 MS
EKG Q-T INTERVAL: 386 MS
EKG QRS DURATION: 88 MS
EKG QTC CALCULATION (BAZETT): 436 MS
EKG R AXIS: 75 DEGREES
EKG T AXIS: 71 DEGREES
EKG VENTRICULAR RATE: 77 BPM

## 2019-07-11 PROCEDURE — 93010 ELECTROCARDIOGRAM REPORT: CPT | Performed by: INTERNAL MEDICINE

## 2020-06-17 ENCOUNTER — HOSPITAL ENCOUNTER (OUTPATIENT)
Dept: MRI IMAGING | Age: 65
Discharge: HOME | End: 2020-06-17
Payer: MEDICARE

## 2020-06-17 PROCEDURE — 72148 MRI LUMBAR SPINE W/O DYE: CPT

## 2020-07-05 ENCOUNTER — APPOINTMENT (OUTPATIENT)
Dept: GENERAL RADIOLOGY | Age: 65
DRG: 480 | End: 2020-07-05
Payer: MEDICARE

## 2020-07-05 ENCOUNTER — APPOINTMENT (OUTPATIENT)
Dept: CT IMAGING | Age: 65
DRG: 480 | End: 2020-07-05
Payer: MEDICARE

## 2020-07-05 ENCOUNTER — HOSPITAL ENCOUNTER (INPATIENT)
Age: 65
LOS: 5 days | Discharge: NURSING, REHAB OR LONG-TERM CARE | DRG: 480 | End: 2020-07-10
Attending: EMERGENCY MEDICINE | Admitting: INTERNAL MEDICINE
Payer: MEDICARE

## 2020-07-05 PROBLEM — S72.431A CLOSED BICONDYLAR FRACTURE OF DISTAL FEMUR, RIGHT, INITIAL ENCOUNTER (HCC): Status: ACTIVE | Noted: 2020-07-05

## 2020-07-05 PROBLEM — S72.421A CLOSED BICONDYLAR FRACTURE OF DISTAL FEMUR, RIGHT, INITIAL ENCOUNTER (HCC): Status: ACTIVE | Noted: 2020-07-05

## 2020-07-05 LAB
A/G RATIO: 1.9 (ref 1.1–2.2)
ALBUMIN SERPL-MCNC: 4.4 G/DL (ref 3.4–5)
ALP BLD-CCNC: 86 U/L (ref 40–129)
ALT SERPL-CCNC: 6 U/L (ref 10–40)
ANION GAP SERPL CALCULATED.3IONS-SCNC: 13 MMOL/L (ref 3–16)
AST SERPL-CCNC: 13 U/L (ref 15–37)
BASOPHILS ABSOLUTE: 0.1 K/UL (ref 0–0.2)
BASOPHILS RELATIVE PERCENT: 1 %
BILIRUB SERPL-MCNC: 0.3 MG/DL (ref 0–1)
BUN BLDV-MCNC: 10 MG/DL (ref 7–20)
CALCIUM SERPL-MCNC: 9.1 MG/DL (ref 8.3–10.6)
CHLORIDE BLD-SCNC: 100 MMOL/L (ref 99–110)
CO2: 23 MMOL/L (ref 21–32)
CREAT SERPL-MCNC: 0.9 MG/DL (ref 0.6–1.2)
EOSINOPHILS ABSOLUTE: 0.2 K/UL (ref 0–0.6)
EOSINOPHILS RELATIVE PERCENT: 2.8 %
ETHANOL: 11 MG/DL (ref 0–0.08)
GFR AFRICAN AMERICAN: >60
GFR NON-AFRICAN AMERICAN: >60
GLOBULIN: 2.3 G/DL
GLUCOSE BLD-MCNC: 99 MG/DL (ref 70–99)
HCT VFR BLD CALC: 39 % (ref 36–48)
HEMOGLOBIN: 12.8 G/DL (ref 12–16)
LYMPHOCYTES ABSOLUTE: 2.5 K/UL (ref 1–5.1)
LYMPHOCYTES RELATIVE PERCENT: 30.5 %
MCH RBC QN AUTO: 29.6 PG (ref 26–34)
MCHC RBC AUTO-ENTMCNC: 32.7 G/DL (ref 31–36)
MCV RBC AUTO: 90.4 FL (ref 80–100)
MONOCYTES ABSOLUTE: 0.9 K/UL (ref 0–1.3)
MONOCYTES RELATIVE PERCENT: 10.6 %
NEUTROPHILS ABSOLUTE: 4.5 K/UL (ref 1.7–7.7)
NEUTROPHILS RELATIVE PERCENT: 55.1 %
PDW BLD-RTO: 13.7 % (ref 12.4–15.4)
PLATELET # BLD: 246 K/UL (ref 135–450)
PMV BLD AUTO: 8.1 FL (ref 5–10.5)
POTASSIUM REFLEX MAGNESIUM: 4.3 MMOL/L (ref 3.5–5.1)
RBC # BLD: 4.32 M/UL (ref 4–5.2)
SODIUM BLD-SCNC: 136 MMOL/L (ref 136–145)
TOTAL PROTEIN: 6.7 G/DL (ref 6.4–8.2)
WBC # BLD: 8.1 K/UL (ref 4–11)

## 2020-07-05 PROCEDURE — 1200000000 HC SEMI PRIVATE

## 2020-07-05 PROCEDURE — 2580000003 HC RX 258: Performed by: INTERNAL MEDICINE

## 2020-07-05 PROCEDURE — 96375 TX/PRO/DX INJ NEW DRUG ADDON: CPT

## 2020-07-05 PROCEDURE — 93005 ELECTROCARDIOGRAM TRACING: CPT | Performed by: EMERGENCY MEDICINE

## 2020-07-05 PROCEDURE — G0480 DRUG TEST DEF 1-7 CLASSES: HCPCS

## 2020-07-05 PROCEDURE — 73562 X-RAY EXAM OF KNEE 3: CPT

## 2020-07-05 PROCEDURE — 94640 AIRWAY INHALATION TREATMENT: CPT

## 2020-07-05 PROCEDURE — 99285 EMERGENCY DEPT VISIT HI MDM: CPT

## 2020-07-05 PROCEDURE — 2700000000 HC OXYGEN THERAPY PER DAY

## 2020-07-05 PROCEDURE — 6370000000 HC RX 637 (ALT 250 FOR IP): Performed by: INTERNAL MEDICINE

## 2020-07-05 PROCEDURE — 85025 COMPLETE CBC W/AUTO DIFF WBC: CPT

## 2020-07-05 PROCEDURE — 96374 THER/PROPH/DIAG INJ IV PUSH: CPT

## 2020-07-05 PROCEDURE — 94761 N-INVAS EAR/PLS OXIMETRY MLT: CPT

## 2020-07-05 PROCEDURE — 80053 COMPREHEN METABOLIC PANEL: CPT

## 2020-07-05 PROCEDURE — 70450 CT HEAD/BRAIN W/O DYE: CPT

## 2020-07-05 PROCEDURE — 6370000000 HC RX 637 (ALT 250 FOR IP): Performed by: HOSPITALIST

## 2020-07-05 PROCEDURE — 6360000002 HC RX W HCPCS: Performed by: INTERNAL MEDICINE

## 2020-07-05 PROCEDURE — 6360000002 HC RX W HCPCS: Performed by: EMERGENCY MEDICINE

## 2020-07-05 RX ORDER — BUDESONIDE AND FORMOTEROL FUMARATE DIHYDRATE 160; 4.5 UG/1; UG/1
2 AEROSOL RESPIRATORY (INHALATION) 2 TIMES DAILY
Status: DISCONTINUED | OUTPATIENT
Start: 2020-07-05 | End: 2020-07-10 | Stop reason: HOSPADM

## 2020-07-05 RX ORDER — ATORVASTATIN CALCIUM 40 MG/1
40 TABLET, FILM COATED ORAL DAILY
Status: DISCONTINUED | OUTPATIENT
Start: 2020-07-05 | End: 2020-07-10 | Stop reason: HOSPADM

## 2020-07-05 RX ORDER — OXYCODONE HYDROCHLORIDE 5 MG/1
5 TABLET ORAL EVERY 6 HOURS PRN
Status: DISCONTINUED | OUTPATIENT
Start: 2020-07-05 | End: 2020-07-06

## 2020-07-05 RX ORDER — SODIUM CHLORIDE 0.9 % (FLUSH) 0.9 %
10 SYRINGE (ML) INJECTION EVERY 12 HOURS SCHEDULED
Status: DISCONTINUED | OUTPATIENT
Start: 2020-07-05 | End: 2020-07-06 | Stop reason: SDUPTHER

## 2020-07-05 RX ORDER — ONDANSETRON 2 MG/ML
4 INJECTION INTRAMUSCULAR; INTRAVENOUS
Status: DISCONTINUED | OUTPATIENT
Start: 2020-07-05 | End: 2020-07-05

## 2020-07-05 RX ORDER — AMMONIA INHALANTS 0.04 G/.3ML
INHALANT RESPIRATORY (INHALATION)
Status: DISPENSED
Start: 2020-07-05 | End: 2020-07-06

## 2020-07-05 RX ORDER — ACETAMINOPHEN 650 MG/1
650 SUPPOSITORY RECTAL EVERY 6 HOURS PRN
Status: DISCONTINUED | OUTPATIENT
Start: 2020-07-05 | End: 2020-07-10 | Stop reason: HOSPADM

## 2020-07-05 RX ORDER — POLYETHYLENE GLYCOL 3350 17 G/17G
17 POWDER, FOR SOLUTION ORAL DAILY PRN
Status: DISCONTINUED | OUTPATIENT
Start: 2020-07-05 | End: 2020-07-10 | Stop reason: HOSPADM

## 2020-07-05 RX ORDER — MORPHINE SULFATE 2 MG/ML
2 INJECTION, SOLUTION INTRAMUSCULAR; INTRAVENOUS EVERY 4 HOURS PRN
Status: DISCONTINUED | OUTPATIENT
Start: 2020-07-05 | End: 2020-07-06

## 2020-07-05 RX ORDER — PROMETHAZINE HYDROCHLORIDE 25 MG/1
12.5 TABLET ORAL EVERY 6 HOURS PRN
Status: DISCONTINUED | OUTPATIENT
Start: 2020-07-05 | End: 2020-07-10 | Stop reason: HOSPADM

## 2020-07-05 RX ORDER — SODIUM CHLORIDE 0.9 % (FLUSH) 0.9 %
10 SYRINGE (ML) INJECTION PRN
Status: DISCONTINUED | OUTPATIENT
Start: 2020-07-05 | End: 2020-07-06 | Stop reason: SDUPTHER

## 2020-07-05 RX ORDER — ESCITALOPRAM OXALATE 10 MG/1
10 TABLET ORAL DAILY
Status: DISCONTINUED | OUTPATIENT
Start: 2020-07-05 | End: 2020-07-10 | Stop reason: HOSPADM

## 2020-07-05 RX ORDER — ALBUTEROL SULFATE 90 UG/1
2 AEROSOL, METERED RESPIRATORY (INHALATION) EVERY 6 HOURS PRN
Status: DISCONTINUED | OUTPATIENT
Start: 2020-07-05 | End: 2020-07-05

## 2020-07-05 RX ORDER — ALBUTEROL SULFATE 90 UG/1
2 AEROSOL, METERED RESPIRATORY (INHALATION) EVERY 4 HOURS PRN
Status: DISCONTINUED | OUTPATIENT
Start: 2020-07-05 | End: 2020-07-10 | Stop reason: HOSPADM

## 2020-07-05 RX ORDER — MORPHINE SULFATE 4 MG/ML
4 INJECTION, SOLUTION INTRAMUSCULAR; INTRAVENOUS
Status: DISCONTINUED | OUTPATIENT
Start: 2020-07-05 | End: 2020-07-05

## 2020-07-05 RX ORDER — ALBUTEROL SULFATE 90 UG/1
2 AEROSOL, METERED RESPIRATORY (INHALATION) 3 TIMES DAILY
Status: DISCONTINUED | OUTPATIENT
Start: 2020-07-05 | End: 2020-07-10 | Stop reason: HOSPADM

## 2020-07-05 RX ORDER — ONDANSETRON 2 MG/ML
4 INJECTION INTRAMUSCULAR; INTRAVENOUS ONCE
Status: COMPLETED | OUTPATIENT
Start: 2020-07-05 | End: 2020-07-05

## 2020-07-05 RX ORDER — GABAPENTIN 300 MG/1
300 CAPSULE ORAL 2 TIMES DAILY
Status: DISCONTINUED | OUTPATIENT
Start: 2020-07-05 | End: 2020-07-10 | Stop reason: HOSPADM

## 2020-07-05 RX ORDER — MORPHINE SULFATE 15 MG/1
15 TABLET, FILM COATED, EXTENDED RELEASE ORAL 2 TIMES DAILY
Status: DISCONTINUED | OUTPATIENT
Start: 2020-07-05 | End: 2020-07-06

## 2020-07-05 RX ORDER — PANTOPRAZOLE SODIUM 40 MG/1
40 TABLET, DELAYED RELEASE ORAL DAILY
Status: DISCONTINUED | OUTPATIENT
Start: 2020-07-05 | End: 2020-07-10 | Stop reason: HOSPADM

## 2020-07-05 RX ORDER — SODIUM CHLORIDE 9 MG/ML
INJECTION, SOLUTION INTRAVENOUS CONTINUOUS
Status: DISCONTINUED | OUTPATIENT
Start: 2020-07-05 | End: 2020-07-07

## 2020-07-05 RX ORDER — ONDANSETRON 2 MG/ML
4 INJECTION INTRAMUSCULAR; INTRAVENOUS EVERY 6 HOURS PRN
Status: DISCONTINUED | OUTPATIENT
Start: 2020-07-05 | End: 2020-07-10 | Stop reason: HOSPADM

## 2020-07-05 RX ORDER — ACETAMINOPHEN 325 MG/1
650 TABLET ORAL EVERY 6 HOURS PRN
Status: DISCONTINUED | OUTPATIENT
Start: 2020-07-05 | End: 2020-07-10 | Stop reason: HOSPADM

## 2020-07-05 RX ORDER — MORPHINE SULFATE 4 MG/ML
4 INJECTION, SOLUTION INTRAMUSCULAR; INTRAVENOUS ONCE
Status: COMPLETED | OUTPATIENT
Start: 2020-07-05 | End: 2020-07-05

## 2020-07-05 RX ORDER — DIAZEPAM 5 MG/1
5 TABLET ORAL EVERY 8 HOURS PRN
Status: DISCONTINUED | OUTPATIENT
Start: 2020-07-05 | End: 2020-07-07

## 2020-07-05 RX ORDER — TRAZODONE HYDROCHLORIDE 100 MG/1
100 TABLET ORAL PRN
COMMUNITY

## 2020-07-05 RX ADMIN — MORPHINE SULFATE 2 MG: 2 INJECTION, SOLUTION INTRAMUSCULAR; INTRAVENOUS at 20:41

## 2020-07-05 RX ADMIN — Medication 10 ML: at 20:18

## 2020-07-05 RX ADMIN — OXYCODONE HYDROCHLORIDE 5 MG: 5 TABLET ORAL at 23:18

## 2020-07-05 RX ADMIN — ENOXAPARIN SODIUM 40 MG: 40 INJECTION SUBCUTANEOUS at 20:18

## 2020-07-05 RX ADMIN — SODIUM CHLORIDE: 9 INJECTION, SOLUTION INTRAVENOUS at 20:19

## 2020-07-05 RX ADMIN — ONDANSETRON 4 MG: 2 INJECTION INTRAMUSCULAR; INTRAVENOUS at 14:41

## 2020-07-05 RX ADMIN — Medication 2 PUFF: at 20:25

## 2020-07-05 RX ADMIN — MORPHINE SULFATE 4 MG: 4 INJECTION, SOLUTION INTRAMUSCULAR; INTRAVENOUS at 14:41

## 2020-07-05 RX ADMIN — MORPHINE SULFATE 2 MG: 2 INJECTION, SOLUTION INTRAMUSCULAR; INTRAVENOUS at 23:31

## 2020-07-05 ASSESSMENT — PAIN DESCRIPTION - ORIENTATION
ORIENTATION: RIGHT
ORIENTATION: RIGHT

## 2020-07-05 ASSESSMENT — PAIN SCALES - GENERAL
PAINLEVEL_OUTOF10: 10
PAINLEVEL_OUTOF10: 0
PAINLEVEL_OUTOF10: 10

## 2020-07-05 ASSESSMENT — PAIN DESCRIPTION - LOCATION
LOCATION: KNEE
LOCATION: KNEE

## 2020-07-05 ASSESSMENT — PAIN DESCRIPTION - FREQUENCY: FREQUENCY: CONTINUOUS

## 2020-07-05 ASSESSMENT — PAIN DESCRIPTION - DESCRIPTORS: DESCRIPTORS: ACHING;SHARP

## 2020-07-05 ASSESSMENT — PAIN DESCRIPTION - PAIN TYPE
TYPE: ACUTE PAIN
TYPE: ACUTE PAIN

## 2020-07-05 ASSESSMENT — PAIN DESCRIPTION - ONSET: ONSET: ON-GOING

## 2020-07-05 NOTE — ED PROVIDER NOTES
I performed a medical screening history and physical exam on this patient. HISTORY OF PRESENT ILLNESS  Eli Aguirre is a 62 y.o. female the patient was standing up from cleaning desk when she got her feet twisted and fell. She states her knee twisted in an awkward angle she felt immediate pain. She states that she has had severe pain ever since this happened and is unable to bear weight. She denies any other injury, specifically denying injury to her head, neck, and back. PHYSICAL EXAM  ED Triage Vitals   BP Temp Temp Source Pulse Resp SpO2 Height Weight   07/05/20 1424 07/05/20 1423 07/05/20 1423 07/05/20 1423 07/05/20 1423 07/05/20 1423 07/05/20 1423 07/05/20 1423   (!) 156/90 98.2 °F (36.8 °C) Oral 100 22 93 % 5' 5\" (1.651 m) 137 lb (62.1 kg)       On exam, patient is laying on her left side, holding her right leg and knee still. She has the right foot propped on a towel. She has significant tenderness of the right knee somewhat so that she will not move or allow me to examine it. When I tried to touch it, she became upset. She has intact and brisk +2 dorsalis pedis pulse in the right foot. She has normal brisk capillary refill throughout the toes. Patient is awake, alert, oriented. MDM  Patient was tearful and appeared to be in significant pain on my evaluation. Is difficult to say whether or not she has a fracture or dislocation of the knee based on the limited evaluation that she will be performed. I did order pain medication was nausea medication to treat her so that a better examination performed later. I also ordered an x-ray.   At this time, it appears her fall was mechanical in nature, so have not ordered lab work-up or other testing          Yossi Sanchez MD  07/05/20 8366

## 2020-07-05 NOTE — ED PROVIDER NOTES
Emergency Physician Note    Chief Complaint  Knee Pain (right knee pain fell from standing position, states \" I got twisted in my feet when I stood up from cleaning a desk\" )       History of Present Illness  Ariel Alcantar is a 62 y.o. female who presents to the ED for fall. Patient reports that she got her feet twisted up and fell injuring her leg. She denies a head injury but is altered and was having trouble completing sentences with me. She denies any other injuries except for the right leg injury. History and review of systems limited by patient's mental status. I have reviewed the following from the nursing documentation:      Prior to Admission medications    Medication Sig Start Date End Date Taking? Authorizing Provider   morphine (MSIR) 30 MG tablet Take 30 mg by mouth 2 times daily. Jim Harper Provider, MD   escitalopram (LEXAPRO) 5 MG tablet Take 10 mg by mouth daily    Historical Provider, MD   gabapentin (NEURONTIN) 300 MG capsule Take 300 mg by mouth 2 times daily. Jim Harper Provider, MD   albuterol sulfate  (90 Base) MCG/ACT inhaler Use 2 puffs 4 times daily for 7 days then as needed for wheezing. Dispense with Spacer and instruct in use. At patient's preference may use 60 dose MDI. May Sub Pro-Air or Proventil as needed per insurance. 9/1/18   Chanelle Pierce DO   zafirlukast (ACCOLATE) 20 MG tablet Take 20 mg by mouth 2 times daily. Historical Provider, MD   estropipate (OGEN) 1.5 MG tablet Take 1.5 mg by mouth daily. Historical Provider, MD   esomeprazole Magnesium (NEXIUM) 40 MG PACK Take 40 mg by mouth daily. Historical Provider, MD   diazepam (VALIUM) 10 MG tablet Take 10 mg by mouth every 6 hours as needed for Anxiety. Historical Provider, MD   baclofen (LIORESAL) 10 MG tablet Take 10 mg by mouth 3 times daily. Historical Provider, MD   atorvastatin (LIPITOR) 40 MG tablet Take 40 mg by mouth daily.     Historical Provider, MD   oxyCODONE 5 MG capsule Take 5 mg by mouth every 6 hours as needed for Pain. Historical Provider, MD   fluticasone-salmeterol (ADVAIR) 250-50 MCG/DOSE AEPB Inhale 1 puff into the lungs every 12 hours. Historical Provider, MD   promethazine (PHENERGAN) 25 MG tablet Take 25 mg by mouth every 6 hours as needed for Nausea. Historical Provider, MD       Allergies as of 07/05/2020    (No Known Allergies)       Past Medical History:   Diagnosis Date    COPD [facility]chronic obstructive [facility] disease) ([facility]     DDD (degenerative disc disease)     Fibromyalgia     Sciatica         Surgical History:   Past Surgical History:   Procedure Laterality Date    CHOLECYSTECTOMY      HAND SURGERY      HYSTERECTOMY      KNEE SURGERY      TONSILLECTOMY          Family History:  No family history on file.     Social History     Socioeconomic History    Marital status: Single     Spouse name: Not on file    Number of children: Not on file    Years of education: Not on file    Highest education level: Not on file   Occupational History    Not on file   Social Needs    Financial resource strain: Not on file    Food insecurity     Worry: Not on file     Inability: Not on file    Transportation needs     Medical: Not on file     Non-medical: Not on file   Tobacco Use    Smoking status: Current Every Day Smoker     Packs/day: 0.50     Types: Cigarettes    Smokeless tobacco: Never Used   Substance and Sexual Activity    Alcohol use: No    Drug use: No    Sexual activity: Not on file   Lifestyle    Physical activity     Days per week: Not on file     Minutes per session: Not on file    Stress: Not on file   Relationships    Social connections     Talks on phone: Not on file     Gets together: Not on file     Attends Jain service: Not on file     Active member of club or organization: Not on file     Attends meetings of clubs or organizations: Not on file     Relationship status: Not on file    Intimate partner violence     Fear of current or ex partner: Not on file     Emotionally abused: Not on file     Physically abused: Not on file     Forced sexual activity: Not on file   Other Topics Concern    Not on file   Social History Narrative    Not on file       Nursing notes reviewed. ED Triage Vitals   Enc Vitals Group      BP 07/05/20 1424 (!) 156/90      Pulse 07/05/20 1423 100      Resp 07/05/20 1423 22      Temp 07/05/20 1423 98.2 °F (36.8 °C)      Temp Source 07/05/20 1423 Oral      SpO2 07/05/20 1423 93 %      Weight 07/05/20 1423 137 lb (62.1 kg)      Height 07/05/20 1423 5' 5\" (1.651 m)      Head Circumference --       Peak Flow --       Pain Score --       Pain Loc --       Pain Edu? --       Excl. in [address]? --        GENERAL: Waxing and waning alertness and will arouse to stimuli but then drifts back to sleep midsentence. Well developed, well nourished with no apparent distress. HENT:  Normocephalic, Atraumatic, moist mucous membranes. EYES:  Pupils equal round and reactive to light, Conjunctiva normal, extraocular movements normal.  NECK:  No meningeal signs, Supple. No tenderness. CHEST:  Regular rate and rhythm, chest wall non-tender. LUNGS:  Clear to auscultation bilaterally. ABDOMEN:  Soft, non-tender, no rebound, rigidity or guarding, non-distended, normal bowel sounds. No costovertebral angle tenderness to palpation. BACK:  No tenderness. EXTREMITIES: Right lower extremity is deformed in the mid femur with distal pulses intact and neurovascularly intact. No range of motion attempted in the right lower extremity. Left lower extremity is within normal limits. Distal pulses present. SKIN: Warm, dry and intact. NEUROLOGIC: Normal mental status. Moving all extremities to command. LABS and DIAGNOSTIC RESULTS  EKG  The Ekg interpreted by me shows  normal sinus rhythm with a rate of 86  Axis is   Normal  QTc is  normal  Intervals and Durations are unremarkable.       ST Segments: no acute change  Delta waves, We thoroughly discussed the history, physical exam, laboratory and imaging studies, as well as, emergency department course. Based upon that discussion, we've decided to admit Matt Ballard for further observation and evaluation of Valery Salcido's femur fracture. As I have deemed necessary from their history, physical, and studies, I have considered and evaluated Matt Ballard for the following diagnoses:        FINAL IMPRESSION  1. Closed fracture of distal end of right femur, unspecified fracture morphology, [facility]    2. Fall from slip, trip, or stumble, initial encounter    3. Polypharmacy        Vitals:  Blood pressure 112/74, pulse 84, temperature 98.2 °F (36.8 °C), temperature source Oral, resp. rate 17, height 5' 5\" (1.651 m), weight 137 lb (62.1 kg), SpO2 97 %. Disposition  Pt is in stable condition upon Admit to med/surg floor. This chart was generated using the [address] [place] [address] dictation system. I created this record but it may contain dictation errors.           Joel Enriquez MD  07/05/20 8640

## 2020-07-05 NOTE — PROGRESS NOTES
Admitted to [place] [address] room 214-1 from ER in stable condition. Alert and oriented but falls to sleep quickly. Resp e/e. 4 liters oxgyen at 98%. Admission questions attempted but pt continuously falls asleep mid sentence. Bed alarm in place and turned on. Call light in reach.

## 2020-07-05 NOTE — ED NOTES
[address] served Linn Honey served Dr. Oralia Puga  07/05/20 [address] [facility] called back. Ilana Godinez  07/05/20 7348 [id number] - Dr. Arthur Choe called back.       Ilana Godinez  07/05/20 5849

## 2020-07-06 ENCOUNTER — ANESTHESIA EVENT (OUTPATIENT)
Dept: OPERATING ROOM | Age: 65
DRG: 480 | End: 2020-07-06
Payer: MEDICARE

## 2020-07-06 ENCOUNTER — ANESTHESIA (OUTPATIENT)
Dept: OPERATING ROOM | Age: 65
DRG: 480 | End: 2020-07-06
Payer: MEDICARE

## 2020-07-06 ENCOUNTER — APPOINTMENT (OUTPATIENT)
Dept: GENERAL RADIOLOGY | Age: 65
DRG: 480 | End: 2020-07-06
Payer: MEDICARE

## 2020-07-06 VITALS
DIASTOLIC BLOOD PRESSURE: 75 MMHG | RESPIRATION RATE: 1 BRPM | OXYGEN SATURATION: 100 % | SYSTOLIC BLOOD PRESSURE: 132 MMHG

## 2020-07-06 LAB
AMPHETAMINE SCREEN, URINE: ABNORMAL
ANION GAP SERPL CALCULATED.3IONS-SCNC: 12 MMOL/L (ref 3–16)
BARBITURATE SCREEN URINE: ABNORMAL
BENZODIAZEPINE SCREEN, URINE: ABNORMAL
BILIRUBIN URINE: NEGATIVE
BLOOD, URINE: NEGATIVE
BUN BLDV-MCNC: 10 MG/DL (ref 7–20)
CALCIUM SERPL-MCNC: 8.4 MG/DL (ref 8.3–10.6)
CANNABINOID SCREEN URINE: POSITIVE
CHLORIDE BLD-SCNC: 102 MMOL/L (ref 99–110)
CLARITY: CLEAR
CO2: 22 MMOL/L (ref 21–32)
COCAINE METABOLITE SCREEN URINE: ABNORMAL
COLOR: YELLOW
CREAT SERPL-MCNC: 0.7 MG/DL (ref 0.6–1.2)
EKG ATRIAL RATE: 86 BPM
EKG DIAGNOSIS: NORMAL
EKG P AXIS: 87 DEGREES
EKG P-R INTERVAL: 138 MS
EKG Q-T INTERVAL: 362 MS
EKG QRS DURATION: 92 MS
EKG QTC CALCULATION (BAZETT): 433 MS
EKG R AXIS: 90 DEGREES
EKG T AXIS: 73 DEGREES
EKG VENTRICULAR RATE: 86 BPM
GFR AFRICAN AMERICAN: >60
GFR NON-AFRICAN AMERICAN: >60
GLUCOSE BLD-MCNC: 125 MG/DL (ref 70–99)
GLUCOSE URINE: NEGATIVE MG/DL
KETONES, URINE: NEGATIVE MG/DL
LEUKOCYTE ESTERASE, URINE: NEGATIVE
Lab: ABNORMAL
METHADONE SCREEN, URINE: ABNORMAL
MICROSCOPIC EXAMINATION: NORMAL
NITRITE, URINE: NEGATIVE
OPIATE SCREEN URINE: POSITIVE
OXYCODONE URINE: POSITIVE
PH UA: 5.5
PH UA: 5.5 (ref 5–8)
PHENCYCLIDINE SCREEN URINE: ABNORMAL
POTASSIUM REFLEX MAGNESIUM: 4.4 MMOL/L (ref 3.5–5.1)
PROPOXYPHENE SCREEN: ABNORMAL
PROTEIN UA: NEGATIVE MG/DL
SODIUM BLD-SCNC: 136 MMOL/L (ref 136–145)
SPECIFIC GRAVITY UA: 1.01 (ref 1–1.03)
URINE REFLEX TO CULTURE: NORMAL
URINE TYPE: NORMAL
UROBILINOGEN, URINE: 0.2 E.U./DL

## 2020-07-06 PROCEDURE — 6360000002 HC RX W HCPCS: Performed by: ORTHOPAEDIC SURGERY

## 2020-07-06 PROCEDURE — 94640 AIRWAY INHALATION TREATMENT: CPT

## 2020-07-06 PROCEDURE — 6370000000 HC RX 637 (ALT 250 FOR IP): Performed by: ORTHOPAEDIC SURGERY

## 2020-07-06 PROCEDURE — 0QSB04Z REPOSITION RIGHT LOWER FEMUR WITH INTERNAL FIXATION DEVICE, OPEN APPROACH: ICD-10-PCS | Performed by: ORTHOPAEDIC SURGERY

## 2020-07-06 PROCEDURE — 2580000003 HC RX 258: Performed by: ORTHOPAEDIC SURGERY

## 2020-07-06 PROCEDURE — 81003 URINALYSIS AUTO W/O SCOPE: CPT

## 2020-07-06 PROCEDURE — 6370000000 HC RX 637 (ALT 250 FOR IP): Performed by: HOSPITALIST

## 2020-07-06 PROCEDURE — 94761 N-INVAS EAR/PLS OXIMETRY MLT: CPT

## 2020-07-06 PROCEDURE — 2500000003 HC RX 250 WO HCPCS: Performed by: NURSE ANESTHETIST, CERTIFIED REGISTERED

## 2020-07-06 PROCEDURE — 7100000000 HC PACU RECOVERY - FIRST 15 MIN: Performed by: ORTHOPAEDIC SURGERY

## 2020-07-06 PROCEDURE — 6370000000 HC RX 637 (ALT 250 FOR IP): Performed by: INTERNAL MEDICINE

## 2020-07-06 PROCEDURE — 3700000000 HC ANESTHESIA ATTENDED CARE: Performed by: ORTHOPAEDIC SURGERY

## 2020-07-06 PROCEDURE — 2580000003 HC RX 258: Performed by: NURSE ANESTHETIST, CERTIFIED REGISTERED

## 2020-07-06 PROCEDURE — 6360000002 HC RX W HCPCS: Performed by: NURSE ANESTHETIST, CERTIFIED REGISTERED

## 2020-07-06 PROCEDURE — 3700000001 HC ADD 15 MINUTES (ANESTHESIA): Performed by: ORTHOPAEDIC SURGERY

## 2020-07-06 PROCEDURE — 2720000010 HC SURG SUPPLY STERILE: Performed by: ORTHOPAEDIC SURGERY

## 2020-07-06 PROCEDURE — 36415 COLL VENOUS BLD VENIPUNCTURE: CPT

## 2020-07-06 PROCEDURE — L1830 KO IMMOB CANVAS LONG PRE OTS: HCPCS | Performed by: ORTHOPAEDIC SURGERY

## 2020-07-06 PROCEDURE — 7100000001 HC PACU RECOVERY - ADDTL 15 MIN: Performed by: ORTHOPAEDIC SURGERY

## 2020-07-06 PROCEDURE — 6360000002 HC RX W HCPCS: Performed by: INTERNAL MEDICINE

## 2020-07-06 PROCEDURE — 3600000014 HC SURGERY LEVEL 4 ADDTL 15MIN: Performed by: ORTHOPAEDIC SURGERY

## 2020-07-06 PROCEDURE — C1713 ANCHOR/SCREW BN/BN,TIS/BN: HCPCS | Performed by: ORTHOPAEDIC SURGERY

## 2020-07-06 PROCEDURE — 80307 DRUG TEST PRSMV CHEM ANLYZR: CPT

## 2020-07-06 PROCEDURE — 3209999900 FLUORO FOR SURGICAL PROCEDURES

## 2020-07-06 PROCEDURE — 2700000000 HC OXYGEN THERAPY PER DAY

## 2020-07-06 PROCEDURE — 2500000003 HC RX 250 WO HCPCS: Performed by: ANESTHESIOLOGY

## 2020-07-06 PROCEDURE — 76942 ECHO GUIDE FOR BIOPSY: CPT | Performed by: ANESTHESIOLOGY

## 2020-07-06 PROCEDURE — 99232 SBSQ HOSP IP/OBS MODERATE 35: CPT | Performed by: INTERNAL MEDICINE

## 2020-07-06 PROCEDURE — 6360000002 HC RX W HCPCS: Performed by: PHYSICIAN ASSISTANT

## 2020-07-06 PROCEDURE — 80048 BASIC METABOLIC PNL TOTAL CA: CPT

## 2020-07-06 PROCEDURE — 6360000002 HC RX W HCPCS: Performed by: ANESTHESIOLOGY

## 2020-07-06 PROCEDURE — 2709999900 HC NON-CHARGEABLE SUPPLY: Performed by: ORTHOPAEDIC SURGERY

## 2020-07-06 PROCEDURE — 2580000003 HC RX 258: Performed by: INTERNAL MEDICINE

## 2020-07-06 PROCEDURE — 3600000004 HC SURGERY LEVEL 4 BASE: Performed by: ORTHOPAEDIC SURGERY

## 2020-07-06 PROCEDURE — 93010 ELECTROCARDIOGRAM REPORT: CPT | Performed by: INTERNAL MEDICINE

## 2020-07-06 PROCEDURE — 1200000000 HC SEMI PRIVATE

## 2020-07-06 DEVICE — LOCKING SCREW
Type: IMPLANTABLE DEVICE | Status: FUNCTIONAL
Brand: AXSOS

## 2020-07-06 DEVICE — CANCELLOUS SCREW
Type: IMPLANTABLE DEVICE | Status: FUNCTIONAL
Brand: AXSOS

## 2020-07-06 DEVICE — CORTEX SCREW
Type: IMPLANTABLE DEVICE | Status: FUNCTIONAL
Brand: AXSOS

## 2020-07-06 DEVICE — DISTAL LATERAL FEMUR PLATE
Type: IMPLANTABLE DEVICE | Status: FUNCTIONAL
Brand: AXSOS

## 2020-07-06 RX ORDER — OXYCODONE AND ACETAMINOPHEN 7.5; 325 MG/1; MG/1
1 TABLET ORAL EVERY 6 HOURS PRN
Status: ON HOLD | COMMUNITY
End: 2020-07-10 | Stop reason: SDUPTHER

## 2020-07-06 RX ORDER — MORPHINE SULFATE 30 MG/1
30 TABLET, FILM COATED, EXTENDED RELEASE ORAL 2 TIMES DAILY
Status: ON HOLD | COMMUNITY
End: 2020-07-06

## 2020-07-06 RX ORDER — OXYCODONE AND ACETAMINOPHEN 7.5; 325 MG/1; MG/1
1 TABLET ORAL EVERY 6 HOURS PRN
Status: DISCONTINUED | OUTPATIENT
Start: 2020-07-06 | End: 2020-07-07

## 2020-07-06 RX ORDER — FENTANYL CITRATE 50 UG/ML
INJECTION, SOLUTION INTRAMUSCULAR; INTRAVENOUS PRN
Status: DISCONTINUED | OUTPATIENT
Start: 2020-07-06 | End: 2020-07-06 | Stop reason: SDUPTHER

## 2020-07-06 RX ORDER — OXYCODONE HYDROCHLORIDE 5 MG/1
5 TABLET ORAL EVERY 4 HOURS PRN
Status: DISCONTINUED | OUTPATIENT
Start: 2020-07-06 | End: 2020-07-07

## 2020-07-06 RX ORDER — MORPHINE SULFATE 4 MG/ML
4 INJECTION, SOLUTION INTRAMUSCULAR; INTRAVENOUS
Status: DISCONTINUED | OUTPATIENT
Start: 2020-07-06 | End: 2020-07-10 | Stop reason: HOSPADM

## 2020-07-06 RX ORDER — HYDRALAZINE HYDROCHLORIDE 20 MG/ML
5 INJECTION INTRAMUSCULAR; INTRAVENOUS EVERY 10 MIN PRN
Status: DISCONTINUED | OUTPATIENT
Start: 2020-07-06 | End: 2020-07-06 | Stop reason: SDUPTHER

## 2020-07-06 RX ORDER — ONDANSETRON 2 MG/ML
INJECTION INTRAMUSCULAR; INTRAVENOUS PRN
Status: DISCONTINUED | OUTPATIENT
Start: 2020-07-06 | End: 2020-07-06 | Stop reason: SDUPTHER

## 2020-07-06 RX ORDER — TRAZODONE HYDROCHLORIDE 100 MG/1
100 TABLET ORAL NIGHTLY
Status: DISCONTINUED | OUTPATIENT
Start: 2020-07-06 | End: 2020-07-10 | Stop reason: HOSPADM

## 2020-07-06 RX ORDER — SODIUM CHLORIDE, SODIUM LACTATE, POTASSIUM CHLORIDE, CALCIUM CHLORIDE 600; 310; 30; 20 MG/100ML; MG/100ML; MG/100ML; MG/100ML
INJECTION, SOLUTION INTRAVENOUS CONTINUOUS PRN
Status: DISCONTINUED | OUTPATIENT
Start: 2020-07-06 | End: 2020-07-06 | Stop reason: SDUPTHER

## 2020-07-06 RX ORDER — SODIUM CHLORIDE 0.9 % (FLUSH) 0.9 %
10 SYRINGE (ML) INJECTION PRN
Status: DISCONTINUED | OUTPATIENT
Start: 2020-07-06 | End: 2020-07-10 | Stop reason: HOSPADM

## 2020-07-06 RX ORDER — PROPOFOL 10 MG/ML
INJECTION, EMULSION INTRAVENOUS PRN
Status: DISCONTINUED | OUTPATIENT
Start: 2020-07-06 | End: 2020-07-06 | Stop reason: SDUPTHER

## 2020-07-06 RX ORDER — LABETALOL HYDROCHLORIDE 5 MG/ML
5 INJECTION, SOLUTION INTRAVENOUS EVERY 10 MIN PRN
Status: DISCONTINUED | OUTPATIENT
Start: 2020-07-06 | End: 2020-07-06 | Stop reason: SDUPTHER

## 2020-07-06 RX ORDER — MIDAZOLAM HYDROCHLORIDE 1 MG/ML
INJECTION INTRAMUSCULAR; INTRAVENOUS PRN
Status: DISCONTINUED | OUTPATIENT
Start: 2020-07-06 | End: 2020-07-06 | Stop reason: SDUPTHER

## 2020-07-06 RX ORDER — PAROXETINE HYDROCHLORIDE 20 MG/1
40 TABLET, FILM COATED ORAL EVERY MORNING
Status: DISCONTINUED | OUTPATIENT
Start: 2020-07-06 | End: 2020-07-10 | Stop reason: HOSPADM

## 2020-07-06 RX ORDER — PAROXETINE HYDROCHLORIDE 40 MG/1
40 TABLET, FILM COATED ORAL EVERY MORNING
Status: ON HOLD | COMMUNITY
End: 2022-02-27

## 2020-07-06 RX ORDER — MORPHINE SULFATE 10 MG/ML
1 INJECTION, SOLUTION INTRAMUSCULAR; INTRAVENOUS EVERY 5 MIN PRN
Status: DISCONTINUED | OUTPATIENT
Start: 2020-07-06 | End: 2020-07-06 | Stop reason: SDUPTHER

## 2020-07-06 RX ORDER — OXYCODONE HYDROCHLORIDE AND ACETAMINOPHEN 5; 325 MG/1; MG/1
1 TABLET ORAL PRN
Status: DISCONTINUED | OUTPATIENT
Start: 2020-07-06 | End: 2020-07-06 | Stop reason: SDUPTHER

## 2020-07-06 RX ORDER — OXYCODONE HYDROCHLORIDE AND ACETAMINOPHEN 5; 325 MG/1; MG/1
2 TABLET ORAL PRN
Status: DISCONTINUED | OUTPATIENT
Start: 2020-07-06 | End: 2020-07-06 | Stop reason: SDUPTHER

## 2020-07-06 RX ORDER — MIDAZOLAM HYDROCHLORIDE 1 MG/ML
INJECTION INTRAMUSCULAR; INTRAVENOUS
Status: COMPLETED
Start: 2020-07-06 | End: 2020-07-06

## 2020-07-06 RX ORDER — ONDANSETRON 2 MG/ML
4 INJECTION INTRAMUSCULAR; INTRAVENOUS
Status: DISCONTINUED | OUTPATIENT
Start: 2020-07-06 | End: 2020-07-06 | Stop reason: SDUPTHER

## 2020-07-06 RX ORDER — MEPERIDINE HYDROCHLORIDE 25 MG/ML
12.5 INJECTION INTRAMUSCULAR; INTRAVENOUS; SUBCUTANEOUS EVERY 5 MIN PRN
Status: DISCONTINUED | OUTPATIENT
Start: 2020-07-06 | End: 2020-07-06 | Stop reason: SDUPTHER

## 2020-07-06 RX ORDER — DIPHENHYDRAMINE HYDROCHLORIDE 50 MG/ML
12.5 INJECTION INTRAMUSCULAR; INTRAVENOUS
Status: DISCONTINUED | OUTPATIENT
Start: 2020-07-06 | End: 2020-07-06 | Stop reason: SDUPTHER

## 2020-07-06 RX ORDER — LIDOCAINE HYDROCHLORIDE 20 MG/ML
INJECTION, SOLUTION INFILTRATION; PERINEURAL PRN
Status: DISCONTINUED | OUTPATIENT
Start: 2020-07-06 | End: 2020-07-06 | Stop reason: SDUPTHER

## 2020-07-06 RX ORDER — SENNA AND DOCUSATE SODIUM 50; 8.6 MG/1; MG/1
1 TABLET, FILM COATED ORAL 2 TIMES DAILY
Status: DISCONTINUED | OUTPATIENT
Start: 2020-07-06 | End: 2020-07-10 | Stop reason: HOSPADM

## 2020-07-06 RX ORDER — MORPHINE SULFATE 10 MG/ML
2 INJECTION, SOLUTION INTRAMUSCULAR; INTRAVENOUS EVERY 5 MIN PRN
Status: DISCONTINUED | OUTPATIENT
Start: 2020-07-06 | End: 2020-07-06 | Stop reason: SDUPTHER

## 2020-07-06 RX ORDER — PROMETHAZINE HYDROCHLORIDE 25 MG/ML
6.25 INJECTION, SOLUTION INTRAMUSCULAR; INTRAVENOUS
Status: DISCONTINUED | OUTPATIENT
Start: 2020-07-06 | End: 2020-07-06 | Stop reason: SDUPTHER

## 2020-07-06 RX ORDER — BUPIVACAINE HYDROCHLORIDE 2.5 MG/ML
INJECTION, SOLUTION EPIDURAL; INFILTRATION; INTRACAUDAL PRN
Status: DISCONTINUED | OUTPATIENT
Start: 2020-07-06 | End: 2020-07-06 | Stop reason: SDUPTHER

## 2020-07-06 RX ORDER — ROCURONIUM BROMIDE 10 MG/ML
INJECTION, SOLUTION INTRAVENOUS PRN
Status: DISCONTINUED | OUTPATIENT
Start: 2020-07-06 | End: 2020-07-06 | Stop reason: SDUPTHER

## 2020-07-06 RX ORDER — SODIUM CHLORIDE 0.9 % (FLUSH) 0.9 %
10 SYRINGE (ML) INJECTION EVERY 12 HOURS SCHEDULED
Status: DISCONTINUED | OUTPATIENT
Start: 2020-07-06 | End: 2020-07-10 | Stop reason: HOSPADM

## 2020-07-06 RX ORDER — DEXAMETHASONE SODIUM PHOSPHATE 4 MG/ML
INJECTION, SOLUTION INTRA-ARTICULAR; INTRALESIONAL; INTRAMUSCULAR; INTRAVENOUS; SOFT TISSUE PRN
Status: DISCONTINUED | OUTPATIENT
Start: 2020-07-06 | End: 2020-07-06 | Stop reason: SDUPTHER

## 2020-07-06 RX ADMIN — Medication 2 PUFF: at 06:55

## 2020-07-06 RX ADMIN — BUPIVACAINE HYDROCHLORIDE 30 ML: 2.5 INJECTION, SOLUTION EPIDURAL; INFILTRATION; INTRACAUDAL; PERINEURAL at 13:05

## 2020-07-06 RX ADMIN — PHENYLEPHRINE HYDROCHLORIDE 100 MCG: 10 INJECTION INTRAVENOUS at 15:04

## 2020-07-06 RX ADMIN — Medication 2 PUFF: at 06:56

## 2020-07-06 RX ADMIN — MORPHINE SULFATE 4 MG: 4 INJECTION, SOLUTION INTRAMUSCULAR; INTRAVENOUS at 09:41

## 2020-07-06 RX ADMIN — Medication 2 PUFF: at 20:00

## 2020-07-06 RX ADMIN — MORPHINE SULFATE 2 MG: 2 INJECTION, SOLUTION INTRAMUSCULAR; INTRAVENOUS at 06:41

## 2020-07-06 RX ADMIN — ROCURONIUM BROMIDE 40 MG: 10 INJECTION, SOLUTION INTRAVENOUS at 14:45

## 2020-07-06 RX ADMIN — Medication 2 G: at 14:30

## 2020-07-06 RX ADMIN — SENNOSIDES AND DOCUSATE SODIUM 1 TABLET: 8.6; 5 TABLET ORAL at 20:18

## 2020-07-06 RX ADMIN — FENTANYL CITRATE 25 MCG: 50 INJECTION INTRAMUSCULAR; INTRAVENOUS at 14:45

## 2020-07-06 RX ADMIN — DIAZEPAM 5 MG: 5 TABLET ORAL at 02:55

## 2020-07-06 RX ADMIN — PROPOFOL 150 MG: 10 INJECTION, EMULSION INTRAVENOUS at 14:45

## 2020-07-06 RX ADMIN — PHENYLEPHRINE HYDROCHLORIDE 100 MCG: 10 INJECTION INTRAVENOUS at 14:56

## 2020-07-06 RX ADMIN — HYDROMORPHONE HYDROCHLORIDE 0.5 MG: 1 INJECTION, SOLUTION INTRAMUSCULAR; INTRAVENOUS; SUBCUTANEOUS at 17:03

## 2020-07-06 RX ADMIN — Medication 10 ML: at 20:18

## 2020-07-06 RX ADMIN — OXYCODONE HYDROCHLORIDE AND ACETAMINOPHEN 1 TABLET: 7.5; 325 TABLET ORAL at 20:19

## 2020-07-06 RX ADMIN — OXYCODONE HYDROCHLORIDE 5 MG: 5 TABLET ORAL at 06:03

## 2020-07-06 RX ADMIN — SODIUM CHLORIDE, POTASSIUM CHLORIDE, SODIUM LACTATE AND CALCIUM CHLORIDE: 600; 310; 30; 20 INJECTION, SOLUTION INTRAVENOUS at 15:24

## 2020-07-06 RX ADMIN — SODIUM CHLORIDE, POTASSIUM CHLORIDE, SODIUM LACTATE AND CALCIUM CHLORIDE: 600; 310; 30; 20 INJECTION, SOLUTION INTRAVENOUS at 14:40

## 2020-07-06 RX ADMIN — HYDROMORPHONE HYDROCHLORIDE 0.5 MG: 1 INJECTION, SOLUTION INTRAMUSCULAR; INTRAVENOUS; SUBCUTANEOUS at 16:45

## 2020-07-06 RX ADMIN — GABAPENTIN 300 MG: 300 CAPSULE ORAL at 20:18

## 2020-07-06 RX ADMIN — GABAPENTIN 300 MG: 300 CAPSULE ORAL at 08:39

## 2020-07-06 RX ADMIN — DEXAMETHASONE SODIUM PHOSPHATE 4 MG: 4 INJECTION, SOLUTION INTRAMUSCULAR; INTRAVENOUS at 14:58

## 2020-07-06 RX ADMIN — PHENYLEPHRINE HYDROCHLORIDE 100 MCG: 10 INJECTION INTRAVENOUS at 15:47

## 2020-07-06 RX ADMIN — ONDANSETRON 4 MG: 2 INJECTION, SOLUTION INTRAMUSCULAR; INTRAVENOUS at 14:58

## 2020-07-06 RX ADMIN — TRAZODONE HYDROCHLORIDE 100 MG: 100 TABLET ORAL at 20:18

## 2020-07-06 RX ADMIN — PANTOPRAZOLE SODIUM 40 MG: 40 TABLET, DELAYED RELEASE ORAL at 08:39

## 2020-07-06 RX ADMIN — LIDOCAINE HYDROCHLORIDE 30 MG: 20 INJECTION, SOLUTION INFILTRATION; PERINEURAL at 14:45

## 2020-07-06 RX ADMIN — SODIUM CHLORIDE: 9 INJECTION, SOLUTION INTRAVENOUS at 19:02

## 2020-07-06 RX ADMIN — SODIUM CHLORIDE: 9 INJECTION, SOLUTION INTRAVENOUS at 06:03

## 2020-07-06 RX ADMIN — MORPHINE SULFATE 2 MG: 2 INJECTION, SOLUTION INTRAMUSCULAR; INTRAVENOUS at 03:03

## 2020-07-06 RX ADMIN — MORPHINE SULFATE 4 MG: 4 INJECTION, SOLUTION INTRAMUSCULAR; INTRAVENOUS at 23:36

## 2020-07-06 RX ADMIN — Medication 2 G: at 23:35

## 2020-07-06 RX ADMIN — SODIUM CHLORIDE: 9 INJECTION, SOLUTION INTRAVENOUS at 13:11

## 2020-07-06 RX ADMIN — ATORVASTATIN CALCIUM 40 MG: 40 TABLET, FILM COATED ORAL at 08:39

## 2020-07-06 RX ADMIN — FENTANYL CITRATE 25 MCG: 50 INJECTION INTRAMUSCULAR; INTRAVENOUS at 16:00

## 2020-07-06 RX ADMIN — MIDAZOLAM 4 MG: 1 INJECTION INTRAMUSCULAR; INTRAVENOUS at 13:05

## 2020-07-06 ASSESSMENT — PULMONARY FUNCTION TESTS
PIF_VALUE: 3
PIF_VALUE: 18
PIF_VALUE: 18
PIF_VALUE: 17
PIF_VALUE: 0
PIF_VALUE: 18
PIF_VALUE: 17
PIF_VALUE: 12
PIF_VALUE: 17
PIF_VALUE: 18
PIF_VALUE: 18
PIF_VALUE: 3
PIF_VALUE: 18
PIF_VALUE: 17
PIF_VALUE: 18
PIF_VALUE: 17
PIF_VALUE: 2
PIF_VALUE: 18
PIF_VALUE: 17
PIF_VALUE: 18
PIF_VALUE: 18
PIF_VALUE: 3
PIF_VALUE: 18
PIF_VALUE: 17
PIF_VALUE: 18
PIF_VALUE: 17
PIF_VALUE: 1
PIF_VALUE: 18
PIF_VALUE: 3
PIF_VALUE: 17
PIF_VALUE: 18
PIF_VALUE: 17
PIF_VALUE: 22
PIF_VALUE: 18
PIF_VALUE: 17
PIF_VALUE: 12
PIF_VALUE: 17
PIF_VALUE: 18
PIF_VALUE: 17
PIF_VALUE: 18
PIF_VALUE: 13
PIF_VALUE: 18
PIF_VALUE: 17
PIF_VALUE: 18
PIF_VALUE: 1
PIF_VALUE: 3
PIF_VALUE: 17
PIF_VALUE: 3
PIF_VALUE: 2
PIF_VALUE: 18
PIF_VALUE: 1
PIF_VALUE: 2
PIF_VALUE: 18
PIF_VALUE: 3
PIF_VALUE: 18
PIF_VALUE: 17
PIF_VALUE: 0
PIF_VALUE: 0
PIF_VALUE: 17
PIF_VALUE: 2
PIF_VALUE: 10
PIF_VALUE: 18
PIF_VALUE: 17
PIF_VALUE: 18
PIF_VALUE: 17
PIF_VALUE: 17
PIF_VALUE: 18
PIF_VALUE: 17
PIF_VALUE: 0
PIF_VALUE: 2
PIF_VALUE: 18
PIF_VALUE: 19
PIF_VALUE: 18
PIF_VALUE: 0
PIF_VALUE: 18
PIF_VALUE: 17
PIF_VALUE: 17
PIF_VALUE: 18
PIF_VALUE: 0
PIF_VALUE: 17
PIF_VALUE: 18
PIF_VALUE: 2
PIF_VALUE: 18
PIF_VALUE: 17
PIF_VALUE: 22
PIF_VALUE: 18
PIF_VALUE: 17
PIF_VALUE: 18

## 2020-07-06 ASSESSMENT — PAIN SCALES - GENERAL
PAINLEVEL_OUTOF10: 10
PAINLEVEL_OUTOF10: 8
PAINLEVEL_OUTOF10: 10
PAINLEVEL_OUTOF10: 6
PAINLEVEL_OUTOF10: 10
PAINLEVEL_OUTOF10: 0
PAINLEVEL_OUTOF10: 8
PAINLEVEL_OUTOF10: 10
PAINLEVEL_OUTOF10: 10
PAINLEVEL_OUTOF10: 6
PAINLEVEL_OUTOF10: 8
PAINLEVEL_OUTOF10: 10
PAINLEVEL_OUTOF10: 6

## 2020-07-06 ASSESSMENT — PAIN DESCRIPTION - DESCRIPTORS
DESCRIPTORS: ACHING;SHARP
DESCRIPTORS: ACHING;DISCOMFORT;DULL

## 2020-07-06 ASSESSMENT — PAIN DESCRIPTION - ONSET
ONSET: ON-GOING
ONSET: ON-GOING

## 2020-07-06 ASSESSMENT — PAIN DESCRIPTION - ORIENTATION
ORIENTATION: RIGHT
ORIENTATION: RIGHT

## 2020-07-06 ASSESSMENT — PAIN DESCRIPTION - FREQUENCY
FREQUENCY: CONTINUOUS
FREQUENCY: CONTINUOUS

## 2020-07-06 ASSESSMENT — PAIN DESCRIPTION - PAIN TYPE
TYPE: SURGICAL PAIN
TYPE: ACUTE PAIN

## 2020-07-06 ASSESSMENT — PAIN DESCRIPTION - LOCATION
LOCATION: KNEE
LOCATION: KNEE

## 2020-07-06 ASSESSMENT — LIFESTYLE VARIABLES: SMOKING_STATUS: 1

## 2020-07-06 NOTE — PROGRESS NOTES
.Discharge instructions for Peripheral Nerve Block Patients    Your nerve block is expected to last between 18-36 hours after surgery. This is an estimation as to how long your nerve block will last. Your nerve block may wear off earlier or may last longer. Please remember while having a nerve block you are an increased risk for BALANCE ISSUES and FALLS!! Taking your pain medication:    If needed, your surgeon will give you a prescription for pain medication. Start taking this medications before the nerve block first begins to wear off or when you first begin to feel discomfort. The idea is to have pain medication in your body before the nerve block wears off. It takes about 60 minutes for the oral pain medication to become fully effective. Keep in mind that nerve blocks often wear off in the middle of the night. If you are going to bed and the block has not started to wear off or you have not had any discomfort, consider setting an alarm to go off in 2-3 hours so you can assess your block. If you notice the block is wearing off or you are starting to have discomfort you can then take your medication. You need to take your pain medication as prescribed. Pain medications can cause sedation and decrease your breathing if you take more than you need for the level of pain you are having. What to expect after a nerve block:  Never blocks affect many types of nerves, including nerves that control movement, pain and normal sensation. Nerve blocks cause feelings such as:   1. Numbness   2. Tingling   3. Heaviness   4. Weakness or inability to move your arm or leg   5. A feeling that your arm or leg has \"fallen asleep\"  Usually the weakness of a nerve block wears off first. The tingling of the nerve block wears off next and finally you may start to notice pain. Keep in mind that this may occur in any order. Once a nerve block starts to wear off it is usually gone within 60 minutes.    Certain nerve blocks may cause other symptoms. If you have had a shoulder block or block near your collar bone, you may have symptoms such as:   1. Mild shortness of breath   2. A hoarse voice   3. Blurry vision   4. Unequal pupils   5. Drooping of your face on the same side as the nerve block   6. Swelling at site of neck where block was placed  These are common side effects of this block type and usually go away within 12-24 hours. Protection of numb arm or leg:  After a nerve block, you cannot feel pain, pressure or extremes in temperature in the effected limb. Because of this your arm or leg is at risk for injury. Here are some helpful tips to remember:  · While awake change positions of arm or leg with block frequently. This helps avoid putting too much pressure on limb. · If you have a cast or tight dressing, check the color of your fingers/toes every couple hours. Call your surgeon with any discoloration. · If you had a leg block, you will have difficulty bearing weight on that leg. Do not trust yourself while block is active. You are A FALL RISK! Have someone assist you with walking until the block completely wears off. · If you have had a shoulder, arm or hand block you may go home with a sling. The sling will help keep your arm in a safe position. Wear the sling at [address] until the block has worn off completely unless otherwise told by your physician.

## 2020-07-06 NOTE — H&P
Hospital Medicine History & Physical      PCP: Gali Dawson    Date of Admission: 7/5/2020    Date of Service: Pt seen/examined on 7/5/2020 and Admitted to Inpatient with expected LOS greater than two midnights due to medical therapy. Chief Complaint:  S/p fall with R knee pain      History Of Present Illness:       62 y.o. female presents following a fall at home. Patient states she was working on a cabinet at home when she tripped and fell, injuring her right leg. She denies head injury, loss of consciousness, syncope or near syncope. She denies any recent fever, chills, cough, cold dysuria. Past Medical History:          Diagnosis Date    [facility]     DDD (degenerative disc disease)     Fibromyalgia     Sciatica        Past Surgical History:          Procedure Laterality Date    CHOLECYSTECTOMY      HAND SURGERY      HYSTERECTOMY      KNEE SURGERY      TONSILLECTOMY         Medications Prior to Admission:      Prior to Admission medications    Medication Sig Start Date End Date Taking? Authorizing Provider   traZODone (DESYREL) 100 MG tablet Take 100 mg by mouth nightly   Yes Historical Provider, MD   escitalopram (LEXAPRO) 5 MG tablet Take 5 mg by mouth every 12 hours    Yes Historical Provider, MD   gabapentin (NEURONTIN) 300 MG capsule Take 300 mg by mouth 2 times daily. .   Yes Historical Provider, MD   albuterol sulfate  (90 Base) MCG/ACT inhaler Use 2 puffs 4 times daily for 7 days then as needed for wheezing. Dispense with Spacer and instruct in use. At patient's preference may use 60 dose MDI. May Sub Pro-Air or Proventil as needed per insurance. 9/1/18  Yes Melissa Azul DO   zafirlukast (ACCOLATE) 20 MG tablet Take 20 mg by mouth 2 times daily. Yes Historical Provider, MD   esomeprazole Magnesium (NEXIUM) 40 MG PACK Take 40 mg by mouth daily.    Yes Historical Provider, MD   baclofen (LIORESAL) 10 MG tablet Take 10 mg by mouth 2 times daily    Yes Historical Provider, MD   atorvastatin (LIPITOR) 40 MG tablet Take 20 mg by mouth daily    Yes Historical Provider, MD   oxyCODONE 5 MG capsule Take 7.5 mg by mouth every 6 hours as needed for Pain. Yes Historical Provider, MD   fluticasone-salmeterol (ADVAIR) 250-50 MCG/DOSE AEPB Inhale 1 puff into the lungs every 12 hours. Yes Historical Provider, MD   promethazine (PHENERGAN) 25 MG tablet Take 25 mg by mouth every 6 hours as needed for Nausea. Yes Historical Provider, MD       Allergies:  Patient has no known allergies. Social History:           TOBACCO:   reports that she has been smoking cigarettes. She has been smoking about 0.50 packs per day. She has never used smokeless tobacco.  ETOH:   reports no history of alcohol use. Family History:      Denies premature CAD. REVIEW OF SYSTEMS:   Pertinent positives as noted in the HPI. All other systems reviewed and negative. PHYSICAL EXAM PERFORMED:    /83   Pulse 79   Temp 98.3 °F (36.8 °C) (Axillary)   Resp 16   Ht 5' 5\" (1.651 m)   Wt 137 lb (62.1 kg)   SpO2 97%   BMI 22.80 kg/m²     General appearance:  No apparent distress, appears stated age and cooperative. HEENT:  Normal cephalic, atraumatic without obvious deformity. Pupils equal, round, and reactive to light. Extra ocular muscles intact. Conjunctivae/corneas clear. Neck: Supple, with full range of motion. No jugular venous distention. Trachea midline. Respiratory:  Normal respiratory effort. No rhonchi, occasional L sided exp wheeze, equal breath sounds bialteraly   Cardiovascular:  Regular rate and rhythm with normal S1/S2 without murmurs, rubs or gallops. Abdomen: Soft, non-tender, non-distended with normal bowel sounds. Musculoskeletal:  No clubbing, cyanosis or edema bilaterally. Full range of motion without deformity. Skin: Skin color, texture, turgor normal.  No rashes or lesions.   Neurologic:  Neurovascularly intact without any focal sensory/motor deficits. Cranial nerves: II-XII intact, grossly non-focal.  Psychiatric:  Alert and oriented, thought content appropriate, normal insight  Capillary Refill: Brisk,< 3 seconds   Peripheral Pulses: +2 palpable, equal bilaterally       Labs:     Recent Labs     07/05/20  1433   WBC 8.1   HGB 12.8   HCT 39.0        Recent Labs     07/05/20  1433      K 4.3      CO2 23   BUN 10   CREATININE 0.9   CALCIUM 9.1     Recent Labs     07/05/20  1433   AST 13*   ALT 6*   BILITOT 0.3   ALKPHOS 86     No results for input(s): INR in the last 72 hours. No results for input(s): Mario Keel in the last 72 hours. Urinalysis:      Lab Results   Component Value Date    NITRU Negative 07/10/2019    WBCUA 0-2 07/10/2019    RBCUA None seen 07/10/2019    BLOODU SMALL 07/10/2019    SPECGRAV 1.025 07/10/2019    GLUCOSEU Negative 07/10/2019       Radiology:        CT HEAD WO CONTRAST   Final Result   No acute intracranial abnormality. XR KNEE RIGHT (3 VIEWS)   Final Result   Displaced, comminuted periprosthetic fracture of the distal femoral   metaphysis at the proximal margin of the femoral component of the right knee   prosthesis. ASSESSMENT:    Active Hospital Problems    Diagnosis Date Noted    Closed bicondylar fracture of distal femur, right, initial encounter [facility]) [B82.025K, [facility]  [facility] 07/05/2020         PLAN:    1) Periprosthetic fracture  - Ortho consulted, denies any post surgical complications  - medically optimal for procedure  - prn IV morphine    2) COPD  - Continue patient's inhalers  - stable    3) GERD  - continue PPI      DVT Prophylaxis: lovenox  Diet: Diet NPO, After Midnight  Code Status: Full Code       Yaima Chavez MD    Thank you Arielle Felix for the opportunity to be involved in this patient's care. If you have any questions or concerns please feel free to contact me at [phone].     Addendum:  Patient denies alcohol intake, no mention on [employer] of alcohol abuse, however (+) ethanol level on admission.  May require ciwa if develops withdrawal symptoms

## 2020-07-06 NOTE — ANESTHESIA POSTPROCEDURE EVALUATION
Department of Anesthesiology  Postprocedure Note    Patient: Rinku Pastrana  MRN: [id number]  YOB: 1955  Date of evaluation: 7/6/2020  Time:  5:51 PM     Procedure Summary     Date:  07/06/20 Room / Location:  [address] [place] [address] / [facility]    Anesthesia Start:  1440 Anesthesia Stop:  9919    Procedure:  RIGHT FEMUR OPEN REDUCTION INTERNAL FIXATION (Right ) Diagnosis:  (RIGHT PERIPROSTHESIS DISTAL FEMORAL FRACTURE)    Surgeon:  Troy Gonzales DO Responsible Provider:  Aruna Dodge MD    Anesthesia Type:  general ASA Status:  3          Anesthesia Type: general    Sheila Phase I: Sheila Score: 10    Sheila Phase II:      Last vitals: Reviewed and per EMR flowsheets. Anesthesia Post Evaluation    Patient location during evaluation: PACU  Patient participation: complete - patient participated  Level of consciousness: awake and alert  Airway patency: patent  Nausea & Vomiting: no nausea and no vomiting  Complications: no  Cardiovascular status: blood pressure returned to baseline  Respiratory status: acceptable  Hydration status: euvolemic  Comments: VSS on transfer to phase 2 recovery. No anesthetic complications.

## 2020-07-06 NOTE — PROGRESS NOTES
HCT 39.0   MCV 90.4   RDW 13.7        BMP:   Recent Labs     07/05/20  1433 07/06/20  0544    136   K 4.3 4.4    102   CO2 23 22   BUN 10 10   CREATININE 0.9 0.7     BNP: No results for input(s): BNP in the last 72 hours. PT/INR: No results for input(s): PROTIME, INR in the last 72 hours. APTT:No results for input(s): APTT in the last 72 hours. CARDIAC ENZYMES: No results for input(s): CKMB, CKMBINDEX, TROPONINI in the last 72 hours. Invalid input(s): CKTOTAL;3  FASTING LIPID PANEL:  Lab Results   Component Value Date    CHOL 152 08/21/2018    HDL 81 08/21/2018    TRIG 93 08/21/2018     LIVER PROFILE:   Recent Labs     07/05/20  1433   AST 13*   ALT 6*   BILITOT 0.3   ALKPHOS 86          CT HEAD WO CONTRAST   Final Result   No acute intracranial abnormality.           XR KNEE RIGHT (3 VIEWS)   Final Result   Displaced, comminuted periprosthetic fracture of the distal femoral   metaphysis at the proximal margin of the femoral component of the right knee   prosthesis. Assessment & Plan:     Patient Active Problem List    Diagnosis Date Noted    Closed bicondylar fracture of distal femur, right, initial encounter ([facility] [address] 07/05/2020     1) Right femur periprosthetic fracture  - Ortho consulted  - prn IV morphine- dose increased due to severe pain  Cleared for surgery     2) COPD  - Continue patient's inhalers  - stable     3) GERD  - continue PPI    4.chronic pain with narcotic dependence    HLD.  Continue lipitor        DVT Prophylaxis: lovenox  Diet: Diet NPO, After Midnight  Code Status: Full Code        Dispo - inpt         Mar Justin

## 2020-07-06 NOTE — PROGRESS NOTES
Inpatient Physical Therapy Evaluation and Treatment    Unit: [address] [place]  Date:  7/7/2020  Patient Name:    Vanita Solis  Admitting diagnosis:  Closed bicondylar fracture of distal femur, right, initial encounter [facility]) [V21.914J, [address]  Admit Date:  7/5/2020  Precautions/Restrictions/WB Status/ Lines/ Wounds/ Oxygen: Fall risk, Bed/chair alarm, Lines -Supplemental O2 (2L) and Purewick catheter, WB Restrictions (NWB R LE) and Knee immobilizer     Physical therapy as per protocol -nonweightbearing right lower extremity, keep knee immobilizer in place while ambulating. When resting in bed may remove knee immobilizer and work on very gentle range of motion 0 to 30 degrees. Treatment Time:  10:45-11:30  Treatment Number:  1   Timed Code Treatment Minutes: 35 minutes  Total Treatment Minutes:  45  minutes    Patient Goals for Therapy: \" go home \"          Discharge Recommendations: SNF  DME needs for discharge: defer to facility       Therapy recommendation for EMS Transport: requires transport by cot due to difficulty with transfers    Therapy recommendations for staff:   Assist of 2 with use of rolling walker (RW), knee immobilizer and gait belt for all transfers to/from [facility]  to/from chair    History of Present Illness: 61 yo female admitted to [facility] on 7/5/20 after fall at home. X-ray showed bicondylar fx of distal femur. Pt with prior R TKA. Pt underwent R femur ORIF on 7/6/20. Physical therapy as per protocol -nonweightbearing right lower extremity, keep knee immobilizer in place while ambulating. When resting in bed may remove knee immobilizer and work on very gentle range of motion 0 to 30 degrees. Home Health S4 Level Recommendation:  NA  AM-PAC Mobility Score    AM-PAC Inpatient Mobility Raw Score : 15       Preadmission Environment    Pt.  Lives with family (mother and son ) mother lives in the house   Home environment:    apartment  attached to house   Steps to enter first floor: No steps  Steps to second floor: N/A  Bathroom: walk in shower and standard height commode  Equipment owned:  and [facility]     Preadmission Status:  Pt. Able to drive: Yes  Pt Fully independent with ADLs: Yes  Pt. Required assistance from family for: Cleaning and Dressing- son assisted with cooking and cleaning. Son does the yard work   Pt. independent for transfers and gait and walked with No Device  History of falls No     Pain  Yes  Ratin/10  Location:Rt LE   Pain Medicine Status: Received pain med prior to tx       Cognition    A&O x4   Able to follow 2 step commands     Subjective  Patient lying supine in bed with no family present. Pt agreeable to this PT eval & tx. Upper Extremity ROM/Strength  Please see OT evaluation. Lower Extremity ROM / Strength   AROM WFL: No  ROM limitations: R LE ~10 deg knee flexion (limited by pain, swelling, and precautions (0-30 deg))    Formal strength testing deferred due to severe pain sitting at EOB. and BLE strength WFL, but not formally assessed with MMT. Lower Extremity Sensation    WFL    Lower Extremity Proprioception:   WFL    Coordination and Tone  WFL    Balance  Sitting:  Good - ; Supervision  Comments: for static sitting at EOB, slouched posture    Standing: Fair +; CGA  Comments: with B UE support on RW    Bed Mobility   Supine to Sit:    Min A  and 2 persons  Sit to Supine:   Not Tested  Rolling:   Not Tested  Scooting in sitting: Mod A at R LE  Scooting in supine:  Not Tested    Transfer Training     Sit to stand:   Min A and mod A of 2nd therapist at R LE to maintain R LE NWB  Stand to sit:   Min A  and mod A of 2nd therapist at R LE to maintain R LE NWB    Bed to Chair:   Min A  and mod A of 2nd therapist at R LE to maintain R LE NWB with use of gait belt, rolling walker (RW) and knee immobilizer on right    Gait gait deferred due to difficulty with transfers; pt ambulated 0 ft.      Activity Tolerance   Pt completed therapy session with Pain noted with all mobility on R LE, improved with rest and ice at end of session   SpO2: 93% on 2L at rest    Positioning Needs   Pt reclined in chair, alarm set, positioned in proper neutral alignment and pressure relief provided. Call light provided and all needs within reach  Ice provided and instructed in proper off/on schedule    Exercises Initiated  all completed bilaterally unless indicated  Ankle Pumps x 10 reps  Glut sets x 10 reps    Other  See OT note for assist with bathing and dressing. Patient/Family Education   Pt educated on role of inpatient PT, POC, importance of continued activity, DC recommendations, safety awareness, transfer techniques, pacing activity, HEP and calling for assist with mobility. Assessment  Pt seen for Physical Therapy evaluation in acute care setting. Pt demonstrated decreased Activity tolerance, Balance, ROM, Safety and Strength as well as decreased independence with Ambulation, Bed Mobility  and Transfers. Recommending SNF upon discharge as patient functioning well below baseline, demonstrates good rehab potential and unable to return home due to limited or no family support, inability to negotiate stairs to enter home/bedroom/bathroom, burden of care beyond caregiver ability, home environment not conducive to patient recovery and limited safety awareness. However pt did fairly well and may progress quickly. Will continue to assess as pt would prefer to d/c home with assist from family. Goals : To be met in 3 visits:  1). Independent with LE Ex x 10 reps    To be met in 6 visits:  1). Supine to/from sit: Independent  2). Sit to/from stand: CGA with maintenance of R LE NWB  3). Bed to chair: CGA with maintenance of R LE NWB  4). Gait: Ambulate 25 ft.   with  CGA and use of rolling walker (RW) with maintenance of R LE NWB  5).   Tolerate B LE exercises 3 sets of 10-15 reps    Rehabilitation Potential: Good  Strengths for achieving goals include:   Pt motivated, PLOF, Family Support and Pt cooperative   Barriers to achieving goals include:    Pain    Plan    To be seen 3-5 x / week  while in acute care setting for therapeutic exercises, bed mobility, transfers, progressive gait training, balance training, and family/patient education. Signature: Naveed Ruggiero PT, DPT #[id number]      If patient discharges from this facility prior to next visit, this note will serve as the Discharge Summary.

## 2020-07-06 NOTE — PROGRESS NOTES
RESPIRATORY THERAPY ASSESSMENT    Name:  Lakshmi [place]ink [place] Record Number:  [id number]  Age: 62 y.o. Gender: female  : 1955  Today's Date:  2020  Room:  [facility]0214-01    Assessment     Is the patient being admitted for a COPD or Asthma exacerbation? No   (If yes the patient will be seen every 4 hours for the first 24 hours and then reassessed)    Patient Admission Diagnosis      Allergies  No Known Allergies    Minimum Predicted Vital Capacity:               Actual Vital Capacity:                    Pulmonary History:COPD  Home Oxygen Therapy:  room air  Home Respiratory Therapy:Albuterol and Salmeterol/Fluticasone Propionate   Current Respiratory Therapy:  Albuterol 2PUFFS  Q6PRN,  Symbicort BID. Treatment Type: MDI  Medications: Budesonide/Formoterol    Respiratory Severity Index(RSI)   Patients with orders for inhalation medications, oxygen, or any therapeutic treatment modality will be placed on Respiratory Protocol. They will be assessed with the first treatment and at least every 72 hours thereafter. The following severity scale will be used to determine frequency of treatment intervention.     Smoking History: Pulmonary Disease or Smoking History, Greater than 15 pack year = 2    Social History  Social History     Tobacco Use    Smoking status: Current Every Day Smoker     Packs/day: 0.50     Types: Cigarettes    Smokeless tobacco: Never Used   Substance Use Topics    Alcohol use: No    Drug use: No       Recent Surgical History: None = 0  Past Surgical History  Past Surgical History:   Procedure Laterality Date    CHOLECYSTECTOMY      HAND SURGERY      HYSTERECTOMY      KNEE SURGERY      TONSILLECTOMY         Level of Consciousness: Alert, Oriented, and Cooperative = 0    Level of Activity: Walking with assistance = 1    Respiratory Pattern: Regular Pattern; RR 8-20 = 0    Breath Sounds: Absent bilaterally and/or with wheezes = 3    Sputum   ,  , Sputum How Obtained: Spontaneous cough  Cough: Strong, spontaneous, non-productive = 0    Vital Signs   /83   Pulse 79   Temp 98.3 °F (36.8 °C) (Axillary)   Resp 16   Ht 5' 5\" (1.651 m)   Wt 137 lb (62.1 kg)   SpO2 97%   BMI 22.80 kg/m²   SPO2 (COPD values may differ): 86-87% on room air or greater than 92% on FiO2 35- 50% = 3    Peak Flow (asthma only): not applicable = 0    RSI: 1-85 = TID (three times daily) and Q4hr PRN for dyspnea        Plan       Goals: medication delivery    Patient/caregiver was educated on the proper method of use for Respiratory Care Devices:  Yes      Level of patient/caregiver understanding able to:   ? Verbalize understanding   ? Demonstrate understanding       ? Teach back        ? Needs reinforcement       ? No available caregiver               ? Other:     Response to education:  Good     Is patient being placed on Home Treatment Regimen? No     Does the patient have everything they need prior to discharge? NA     Comments: Chart reviewed and patient assessed. Plan of Care: Albuterol 2PUFFS TID,  Symbicort BID. Electronically signed by Cecile Fang RCP on 7/5/2020 at 9:41 PM    Respiratory Protocol Guidelines     1. Assessment and treatment by Respiratory Therapy will be initiated for medication and therapeutic interventions upon initiation of aerosolized medication. 2. Physician will be contacted for respiratory rate (RR) greater than 35 breaths per minute. Therapy will be held for heart rate (HR) greater than 140 beats per minute, pending direction from physician. 3. Bronchodilators will be administered via Metered Dose Inhaler (MDI) with spacer when the following criteria are met:  a. Alert and cooperative     b. HR < 140 bpm  c. RR < 30 bpm                d. Can demonstrate a 2-3 second inspiratory hold  4. Bronchodilators will be administered via Hand Held Nebulizer [facility]) to patients when ANY of the following criteria are met  a.  Incognizant or uncooperative b. Patients treated with HHN at Home        c. Unable to demonstrate proper use of MDI with spacer     d. RR > 30 bpm   5. Bronchodilators will be delivered via Metered Dose Inhaler (MDI), HHN, Aerogen to intubated patients on mechanical ventilation. 6. Inhalation medication orders will be delivered and/or substituted as outlined below. Aerosolized Medications Ordering and Administration Guidelines:    1. All Medications will be ordered by a physician, and their frequency and/or modality will be adjusted as defined by the patients Respiratory Severity Index (RSI) score. 2. If the patient does not have documented COPD, consider discontinuing anticholinergics when RSI is less than 9.  3. If the bronchospasm worsens (increased RSI), then the bronchodilator frequency can be increased to a maximum of every 4 hours. If greater than every 4 hours is required, the physician will be contacted. 4. If the bronchospasm improves, the frequency of the bronchodilator can be decreased, based on the patient's RSI, but not less than home treatment regimen frequency. 5. Bronchodilator(s) will be discontinued if patient has a RSI less than 9 and has received no scheduled or as needed treatment for 72  Hrs. Patients Ordered on a Mucolytic Agent:    1. Must always be administered with a bronchodilator. 2. Discontinue if patient experiences worsened bronchospasm, or secretions have lessened to the point that the patient is able to clear them with a cough. Anti-inflammatory and Combination Medications:    1. If the patient lacks prior history of lung disease, is not using inhaled anti-inflammatory medication at home, and lacks wheezing by examination or by history for at least 24 hours, contact physician for possible discontinuation.

## 2020-07-06 NOTE — OP NOTE
Evelyn Hathaway (1955)    Date of Surgery- 7/6/2020          Pre-operative Diagnosis: Right  intraarticular comminuted distal Femur Fracture     Post-operative Diagnosis: Same     Procedure:  1. ORIF Right intraarticular comminuted distal femoral fracture (CPT 59718) (CPT 21073 intra-articular)    2. Intraoperative Flouroscopy Right Femur and knee with Interpretation (CPT I5364189 femur, 76262-19 knee)    Surgeon: Rey Stanford DO     Assistant: Surgical Assistant: Farnaz Yoder  Scrub Person First: Amanda Nelson    Anesthesia: General     EBL: approx 200cc     Tourniquet Time: None     Specimens: None     Drains: None     Complications: None     Surgical Findings: Comminuted distal periprosthetic femur fracture    Implants: [place] 6-hole distal femur plate, locking and nonlocking screws    INDICATIONS FOR PROCEDURE: The patient sustained the above injury with unsatisfactory alignment and/or stability. We discussed operative vs. non operative treatment options, including risks and benefits of each. After multiple questions were asked and answered, the above procedure was recommended. The patient and/or POA ultimately elected to undergo the above-named procedure and surgical consent was signed willingly on the hospital mao. The patient was counseled at length about the risks of hailey Covid-19 during their perioperative period and any recovery window from their procedure. The patient was made aware that hailey Covid-19  may worsen their prognosis for recovering from their procedure  and lend to a higher morbidity and/or mortality risk. All material risks, benefits, and reasonable alternatives including postponing the procedure were discussed. The patient does wish to proceed with the procedure at this time. PROCEDURE: The patient was identified in the pre-operative holding area.  The Right lower extremity region was identified by the patient, the patient's family, myself, preoperative progress notes and radiographs, anesthesia and nursing staffs as being the surgical area of interest. This area was then initialized by myself. Preoperative IV antibiotics were then infused and the patient was taken to the operating room. A TIME OUT was then performed for patient, side, site, and antibiotic infusion. The lower extremity was then prepped and draped in a sterile fashion. A standard longitudinal incision was then carried out over the lateral aspect of the distal femur with sharp dissection through the skin, subcutaneous tissues, and to the lateral femoral condyle. Under fluoroscopic guidance, a guide pin was placed in the central position of the condyles. Appropriate placement was confirmed with fluoroscopy on both AP and lateral views. Screw and distal femoral plate placement was performed with the [employer] variable angle distal femoral plate equipment, and the fracture was aligned with satisfactory alignment. Proximal and distal screws were placed. XRay demonstrated satisfactory alignment and stability. Hardware was in good stable position. The wounds were then copiously irrigated with sterile saline fluid and closed in layers with the appropriate suture and/or staple material. Appropriate sterile dressings were then applied via standard technique. The plan was for then transfer to the PACU postoperatively. RADIOGRAPHIC ADDENDUM: It should be noted that multiple AP and lateral radiographs of the patient's Right knee and femur region were obtained throughout the above-named procedure to ascertain appropriate fracture reduction and hardware placement. These were interpreted by myself intra-operatively.                  Jaison Lamb DO  Sports Medicine & Arthroscopic Surgery  [address] [facility])

## 2020-07-06 NOTE — PROGRESS NOTES
4 Eyes Skin Assessment     The patient is being assess for   Admission    I agree that 2 RN's have performed a thorough Head to Toe Skin Assessment on the patient. ALL assessment sites listed below have been assessed. Areas assessed by both nurses:   [x]   Head, Face, and Ears   [x]   Shoulders, Back, and Chest, Abdomen  [x]   Arms, Elbows, and Hands   [x]   Coccyx, Sacrum, and Ischium  [x]   Legs, Feet, and Heels            **SHARE this note so that the co-signing nurse is able to place an eSignature**    Co-signer eSignature: Electronically signed by Georgette Riddle RN on 7/6/20 at 6:50 AM EDT    Does the Patient have Skin Breakdown?   No          Yadiel Prevention initiated:  Yes   Wound Care Orders initiated:  No      [facility] nurse consulted for Pressure Injury (Stage 3,4, Unstageable, DTI, NWPT, Complex wounds)and New or Established Ostomies:  NA      Primary Nurse eSignature: Electronically signed by Josefina More RN on 7/6/20 at 6:49 AM EDT

## 2020-07-06 NOTE — ANESTHESIA PRE PROCEDURE
Frequency Provider Last Rate Last Dose    oxyCODONE-acetaminophen (PERCOCET) 7.5-325 MG per tablet 1 tablet  1 tablet Oral Q6H PRN Fernanda Harp MD        PARoxetine (PAXIL) tablet 40 mg  40 mg Oral QAM Fernanda Harp MD        traZODone (DESYREL) tablet 100 mg  100 mg Oral Nightly Fernanda Harp MD        morphine sulfate (PF) injection 4 mg  4 mg Intravenous Q3H PRN Fernanda Harp MD   4 mg at 07/06/20 0941    ceFAZolin (ANCEF) 2 g in sterile water 20 mL IV syringe  2 g Intravenous On Call to Kendrick Albright, PA        atorvastatin (LIPITOR) tablet 40 mg  40 mg Oral Daily Andrew Single, MD   40 mg at 07/06/20 0839    escitalopram (LEXAPRO) tablet 10 mg  10 mg Oral Daily Andrew MD Bayron        pantoprazole (PROTONIX) tablet 40 mg  40 mg Oral Daily Andrew Single, MD   40 mg at 07/06/20 0839    budesonide-formoterol (SYMBICORT) 160-4.5 MCG/ACT inhaler 2 puff  2 puff Inhalation BID Andrew MD Bayron   2 puff at 07/06/20 0656    gabapentin (NEURONTIN) capsule 300 mg  300 mg Oral BID Andrew Single, MD   300 mg at 07/06/20 0839    diazePAM (VALIUM) tablet 5 mg  5 mg Oral Q8H PRN Andrew MD Bayron   5 mg at 07/06/20 0255    sodium chloride flush 0.9 % injection 10 mL  10 mL Intravenous 2 times per day Andrew MD Bayron   10 mL at 07/05/20 2018    sodium chloride flush 0.9 % injection 10 mL  10 mL Intravenous PRN Andrew MD Bayron        acetaminophen (TYLENOL) tablet 650 mg  650 mg Oral Q6H PRN Andrew MD Bayron        Or    acetaminophen (TYLENOL) suppository 650 mg  650 mg Rectal Q6H PRN Andrew MD Bayron        polyethylene glycol [facility]) packet 17 g  17 g Oral Daily PRN Andrew MD Bayron        promethazine (PHENERGAN) tablet 12.5 mg  12.5 mg Oral Q6H PRN Andrew MD Bayron        Or    ondansetron [facility]) injection 4 mg  4 mg Intravenous Q6H PRN Andrew MD Bayron  enoxaparin (LOVENOX) injection 40 mg  40 mg Subcutaneous Daily Ld Velasquez MD   40 mg at 07/05/20 2018    0.9 % sodium chloride infusion   Intravenous Continuous Ld Velasquez  mL/hr at 07/06/20 1311      albuterol sulfate  (90 Base) MCG/ACT inhaler 2 puff  2 puff Inhalation Q4H PRN Fady Jaqeuz MD        albuterol sulfate  (90 Base) MCG/ACT inhaler 2 puff  2 puff Inhalation TID Fady Jaquez MD   2 puff at 07/06/20 0655     Facility-Administered Medications Ordered in Other Encounters   Medication Dose Route Frequency Provider Last Rate Last Dose    midazolam (VERSED) injection    PRN Shelle Claude, MD   4 mg at 07/06/20 1305    bupivacaine (PF) (MARCAINE) 0.25 % injection    PRN Shelle Claude, MD   30 mL at 07/06/20 1305       Allergies:  No Known Allergies    Problem List:    Patient Active Problem List   Diagnosis Code    Closed [facility] distal femur, right, [facility] S72.421A, S72.431A    Closed fracture of right distal femur ([facility] S72.401A    Chronic obstructive pulmonary disease ([facility] J44.9    Narcotic dependence ([facility] F11.20       Past Medical History:        Diagnosis Date    COPD (chronic obstructive pulmonary disease) ([facility]     DDD (degenerative disc disease)     Fibromyalgia     Sciatica        Past Surgical History:        Procedure Laterality Date    CHOLECYSTECTOMY      HAND SURGERY      HYSTERECTOMY      KNEE SURGERY      TONSILLECTOMY         Social History:    Social History     Tobacco Use    Smoking status: Current Every Day Smoker     Packs/day: 0.50     Types: Cigarettes    Smokeless tobacco: Never Used   Substance Use Topics    Alcohol use:  No                                Ready to quit: Not Answered  Counseling given: Not Answered      Vital Signs (Current):   Vitals:    07/06/20 0302 07/06/20 0658 07/06/20 0844 07/06/20 1226   BP: (!) 142/94  131/77 122/75   Pulse: 99  89 87 Resp: 18 18 16 16   Temp: 99.5 °F (37.5 °C)  99 °F (37.2 °C)    TempSrc: Oral  Oral    SpO2: 96% 95% 97% 97%   Weight:    137 lb (62.1 kg)   Height:    5' 5\" (1.651 m)                                              BP Readings from Last 3 Encounters:   07/06/20 122/75   07/10/19 110/76   04/08/19 119/80       NPO Status: Time of last liquid consumption: 0603                        Time of last solid consumption: 1900                        Date of last liquid consumption: 07/06/20(sip water meds)                        Date of last solid food consumption: 07/04/20    BMI:   Wt Readings from Last 3 Encounters:   07/06/20 137 lb (62.1 kg)   07/10/19 128 lb (58.1 kg)   04/08/19 128 lb (58.1 kg)     Body mass index is 22.8 kg/m². CBC:   Lab Results   Component Value Date    WBC 8.1 07/05/2020    RBC 4.32 07/05/2020    HGB 12.8 07/05/2020    HCT 39.0 07/05/2020    MCV 90.4 07/05/2020    RDW 13.7 07/05/2020     07/05/2020       CMP:   Lab Results   Component Value Date     07/06/2020    K 4.4 07/06/2020     07/06/2020    CO2 22 07/06/2020    BUN 10 07/06/2020    CREATININE 0.7 07/06/2020    GFRAA >60 07/06/2020    AGRATIO 1.9 07/05/2020    LABGLOM >60 07/06/2020    GLUCOSE 125 07/06/2020    PROT 6.7 07/05/2020    CALCIUM 8.4 07/06/2020    BILITOT 0.3 07/05/2020    ALKPHOS 86 07/05/2020    AST 13 07/05/2020    ALT 6 07/05/2020       POC Tests: No results for input(s): POCGLU, POCNA, POCK, POCCL, POCBUN, POCHEMO, POCHCT in the last 72 hours.     Coags:   Lab Results   Component Value Date    PROTIME 12.0 07/10/2019    INR 1.05 07/10/2019    APTT 37.8 07/10/2019       HCG (If Applicable): No results found for: PREGTESTUR, PREGSERUM, HCG, HCGQUANT     ABGs: No results found for: PHART, PO2ART, HJQ1FFK, QBC2VAM, BEART, L4JCCOVH     Type & Screen (If Applicable):  No results found for: LABABO, LABRH    Drug/Infectious Status (If Applicable):  No results found for: HIV, HEPCAB    COVID-19 Screening (If Applicable): No results found for: COVID19      Anesthesia Evaluation   no history of anesthetic complications:   Airway: Mallampati: II  TM distance: >3 FB   Neck ROM: limited  Mouth opening: > = 3 FB Dental:          Pulmonary:   (+) COPD:  current smoker                           Cardiovascular:    (+) hyperlipidemia                  Neuro/Psych:   (+) neuromuscular disease (Chronic pain):,             GI/Hepatic/Renal: Neg GI/Hepatic/Renal ROS            Endo/Other: Negative Endo/Other ROS                    Abdominal:           Vascular: negative vascular ROS. Anesthesia Plan      general     ASA 3     (Risks, benefits and alternatives of GETA for operative care and ultrasound guided fascia iliaca compartment nerve block for post operative analgesia discussed with pt. All questions answered. Willing to proceed.)  Induction: intravenous. MIPS: Postoperative opioids intended. Anesthetic plan and risks discussed with patient.                       Tavon Dee MD   7/6/2020

## 2020-07-06 NOTE — PROGRESS NOTES
Physical/Occupational Therapy Note  PT/OT orders received. Ortho consult still pending for R bicondylar distal femur fx. Will follow-up once ortho has cleared pt for mobility and clarified any precautions or weight bearing orders. No Charge. Leatha Zhu, PT, DPT #[id number]  Elder Infante, OTR/L [id number]    Addendum (10:30)  Per ortho PAVickie, plan for OR today and will initiate therapy tomorrow. No charge.    Leatha Zhu PT, DPT #[id number]  Elder Infante, OTR/L [id number]

## 2020-07-06 NOTE — PROGRESS NOTES
.Patient left the floor in stable condition to room 205 with transport and all belongings. Angle Ibrahim

## 2020-07-06 NOTE — PLAN OF CARE
Department of Orthopedic Surgery  Physician Assistant   Pre- Rounding Consult Note/Plan of Care Note      Reason for Consult:  Right femur fracture  Date of Service: 7/6/2020 10:22 AM    HISTORY OF PRESENT ILLNESS:                The patient is a 62 y.o. female who presents with above chief complaint. Pt with prior TKR on the right and left side. She tripped last night and felt her right leg buckle. Unable to get up or walk after fall. No n/t in the leg. Past Medical History:        Diagnosis Date    COPD (chronic obstructive pulmonary disease) ([address]     DDD (degenerative disc disease)     Fibromyalgia     Sciatica      Past Surgical History:        Procedure Laterality Date    CHOLECYSTECTOMY      HAND SURGERY      HYSTERECTOMY      KNEE SURGERY      TONSILLECTOMY           Medications Prior to Admission:   Prior to Admission medications    Medication Sig Start Date End Date Taking? Authorizing Provider   PARoxetine (PAXIL) 40 MG tablet Take 40 mg by mouth every morning   Yes Historical Provider, MD   oxyCODONE-acetaminophen (PERCOCET) 7.5-325 MG per tablet Take 1 tablet by mouth every 6 hours as needed for Pain. Yes Historical Provider, MD   traZODone (DESYREL) 100 MG tablet Take 100 mg by mouth nightly   Yes Historical Provider, MD   gabapentin (NEURONTIN) 300 MG capsule Take 300 mg by mouth 2 times daily. .   Yes Historical Provider, MD   albuterol sulfate  (90 Base) MCG/ACT inhaler Use 2 puffs 4 times daily for 7 days then as needed for wheezing. Dispense with Spacer and instruct in use. At patient's preference may use 60 dose MDI. May Sub Pro-Air or Proventil as needed per insurance. 9/1/18  Yes Diya Azul DO   zafirlukast (ACCOLATE) 20 MG tablet Take 20 mg by mouth 2 times daily. Yes Historical Provider, MD   esomeprazole Magnesium (NEXIUM) 40 MG PACK Take 40 mg by mouth daily.    Yes Historical Provider, MD   baclofen (LIORESAL) 10 MG tablet Take 10 mg by mouth 2 times control.       Full consult to follow    Kevin Lozano

## 2020-07-06 NOTE — PLAN OF CARE
Problem: Falls - Risk of:  Goal: Will remain free from falls  Description: Will remain free from falls  Outcome: Ongoing   Fall precautions in place, bed alarm on, nonskid foot wear applied, bed in lowest position, and call light within reach. Will continue to monitor. Problem: Pain:  Goal: Control of acute pain  Description: Control of acute pain  Outcome: Ongoing   Pt has morphine ordered PRN for pain control. Pt sleeping on the bed quietly, call light in reach.

## 2020-07-06 NOTE — PROGRESS NOTES
.Patient pre-op admission complete see flow sheet. IV to right forearm infusing from floor. Safety checks complete. Call light at bedside.

## 2020-07-07 LAB
ANION GAP SERPL CALCULATED.3IONS-SCNC: 6 MMOL/L (ref 3–16)
BUN BLDV-MCNC: 7 MG/DL (ref 7–20)
CALCIUM SERPL-MCNC: 8.2 MG/DL (ref 8.3–10.6)
CHLORIDE BLD-SCNC: 103 MMOL/L (ref 99–110)
CO2: 25 MMOL/L (ref 21–32)
CREAT SERPL-MCNC: 0.6 MG/DL (ref 0.6–1.2)
GFR AFRICAN AMERICAN: >60
GFR NON-AFRICAN AMERICAN: >60
GLUCOSE BLD-MCNC: 137 MG/DL (ref 70–99)
HCT VFR BLD CALC: 24.6 % (ref 36–48)
HEMOGLOBIN: 8 G/DL (ref 12–16)
MCH RBC QN AUTO: 29.5 PG (ref 26–34)
MCHC RBC AUTO-ENTMCNC: 32.4 G/DL (ref 31–36)
MCV RBC AUTO: 91 FL (ref 80–100)
PDW BLD-RTO: 13.7 % (ref 12.4–15.4)
PLATELET # BLD: 150 K/UL (ref 135–450)
PMV BLD AUTO: 7.6 FL (ref 5–10.5)
POTASSIUM SERPL-SCNC: 3.9 MMOL/L (ref 3.5–5.1)
RBC # BLD: 2.7 M/UL (ref 4–5.2)
SODIUM BLD-SCNC: 134 MMOL/L (ref 136–145)
WBC # BLD: 6.6 K/UL (ref 4–11)

## 2020-07-07 PROCEDURE — 2700000000 HC OXYGEN THERAPY PER DAY

## 2020-07-07 PROCEDURE — 80048 BASIC METABOLIC PNL TOTAL CA: CPT

## 2020-07-07 PROCEDURE — 1200000000 HC SEMI PRIVATE

## 2020-07-07 PROCEDURE — 6360000002 HC RX W HCPCS: Performed by: ORTHOPAEDIC SURGERY

## 2020-07-07 PROCEDURE — 97530 THERAPEUTIC ACTIVITIES: CPT

## 2020-07-07 PROCEDURE — 6370000000 HC RX 637 (ALT 250 FOR IP): Performed by: ORTHOPAEDIC SURGERY

## 2020-07-07 PROCEDURE — 97166 OT EVAL MOD COMPLEX 45 MIN: CPT

## 2020-07-07 PROCEDURE — 94761 N-INVAS EAR/PLS OXIMETRY MLT: CPT

## 2020-07-07 PROCEDURE — 97162 PT EVAL MOD COMPLEX 30 MIN: CPT

## 2020-07-07 PROCEDURE — 36415 COLL VENOUS BLD VENIPUNCTURE: CPT

## 2020-07-07 PROCEDURE — 85027 COMPLETE CBC AUTOMATED: CPT

## 2020-07-07 PROCEDURE — 94640 AIRWAY INHALATION TREATMENT: CPT

## 2020-07-07 PROCEDURE — 99232 SBSQ HOSP IP/OBS MODERATE 35: CPT | Performed by: INTERNAL MEDICINE

## 2020-07-07 PROCEDURE — 97110 THERAPEUTIC EXERCISES: CPT

## 2020-07-07 PROCEDURE — 2580000003 HC RX 258: Performed by: ORTHOPAEDIC SURGERY

## 2020-07-07 PROCEDURE — 97535 SELF CARE MNGMENT TRAINING: CPT

## 2020-07-07 PROCEDURE — 6370000000 HC RX 637 (ALT 250 FOR IP): Performed by: NURSE PRACTITIONER

## 2020-07-07 RX ORDER — OXYCODONE HYDROCHLORIDE AND ACETAMINOPHEN 5; 325 MG/1; MG/1
2 TABLET ORAL EVERY 4 HOURS PRN
Status: DISCONTINUED | OUTPATIENT
Start: 2020-07-07 | End: 2020-07-10 | Stop reason: HOSPADM

## 2020-07-07 RX ORDER — OXYCODONE HYDROCHLORIDE AND ACETAMINOPHEN 5; 325 MG/1; MG/1
1 TABLET ORAL EVERY 4 HOURS PRN
Status: DISCONTINUED | OUTPATIENT
Start: 2020-07-07 | End: 2020-07-10 | Stop reason: HOSPADM

## 2020-07-07 RX ADMIN — PANTOPRAZOLE SODIUM 40 MG: 40 TABLET, DELAYED RELEASE ORAL at 08:49

## 2020-07-07 RX ADMIN — Medication 2 G: at 04:47

## 2020-07-07 RX ADMIN — OXYCODONE HYDROCHLORIDE AND ACETAMINOPHEN 2 TABLET: 5; 325 TABLET ORAL at 13:43

## 2020-07-07 RX ADMIN — Medication 10 ML: at 08:49

## 2020-07-07 RX ADMIN — Medication 2 PUFF: at 19:05

## 2020-07-07 RX ADMIN — ATORVASTATIN CALCIUM 40 MG: 40 TABLET, FILM COATED ORAL at 08:49

## 2020-07-07 RX ADMIN — MORPHINE SULFATE 4 MG: 4 INJECTION, SOLUTION INTRAMUSCULAR; INTRAVENOUS at 10:10

## 2020-07-07 RX ADMIN — OXYCODONE HYDROCHLORIDE AND ACETAMINOPHEN 2 TABLET: 5; 325 TABLET ORAL at 17:51

## 2020-07-07 RX ADMIN — OXYCODONE HYDROCHLORIDE AND ACETAMINOPHEN 1 TABLET: 7.5; 325 TABLET ORAL at 09:00

## 2020-07-07 RX ADMIN — Medication 2 PUFF: at 07:07

## 2020-07-07 RX ADMIN — ENOXAPARIN SODIUM 40 MG: 40 INJECTION SUBCUTANEOUS at 08:48

## 2020-07-07 RX ADMIN — Medication 2 PUFF: at 13:05

## 2020-07-07 RX ADMIN — GABAPENTIN 300 MG: 300 CAPSULE ORAL at 22:05

## 2020-07-07 RX ADMIN — MORPHINE SULFATE 4 MG: 4 INJECTION, SOLUTION INTRAMUSCULAR; INTRAVENOUS at 04:55

## 2020-07-07 RX ADMIN — PAROXETINE HYDROCHLORIDE 40 MG: 20 TABLET, FILM COATED ORAL at 08:49

## 2020-07-07 RX ADMIN — OXYCODONE HYDROCHLORIDE AND ACETAMINOPHEN 2 TABLET: 5; 325 TABLET ORAL at 22:04

## 2020-07-07 RX ADMIN — TRAZODONE HYDROCHLORIDE 100 MG: 100 TABLET ORAL at 22:04

## 2020-07-07 RX ADMIN — ESCITALOPRAM OXALATE 10 MG: 10 TABLET ORAL at 08:49

## 2020-07-07 RX ADMIN — GABAPENTIN 300 MG: 300 CAPSULE ORAL at 08:49

## 2020-07-07 RX ADMIN — SENNOSIDES AND DOCUSATE SODIUM 1 TABLET: 8.6; 5 TABLET ORAL at 22:04

## 2020-07-07 RX ADMIN — Medication 10 ML: at 22:04

## 2020-07-07 RX ADMIN — SENNOSIDES AND DOCUSATE SODIUM 1 TABLET: 8.6; 5 TABLET ORAL at 08:49

## 2020-07-07 ASSESSMENT — PAIN DESCRIPTION - PROGRESSION
CLINICAL_PROGRESSION: GRADUALLY WORSENING

## 2020-07-07 ASSESSMENT — PAIN DESCRIPTION - FREQUENCY
FREQUENCY: CONTINUOUS

## 2020-07-07 ASSESSMENT — PAIN DESCRIPTION - PAIN TYPE
TYPE: SURGICAL PAIN

## 2020-07-07 ASSESSMENT — PAIN SCALES - GENERAL
PAINLEVEL_OUTOF10: 7
PAINLEVEL_OUTOF10: 8
PAINLEVEL_OUTOF10: 9
PAINLEVEL_OUTOF10: 8
PAINLEVEL_OUTOF10: 6
PAINLEVEL_OUTOF10: 9
PAINLEVEL_OUTOF10: 8

## 2020-07-07 ASSESSMENT — PAIN DESCRIPTION - ORIENTATION
ORIENTATION: RIGHT

## 2020-07-07 ASSESSMENT — PAIN DESCRIPTION - ONSET
ONSET: ON-GOING

## 2020-07-07 ASSESSMENT — PAIN DESCRIPTION - DESCRIPTORS
DESCRIPTORS: ACHING;SHARP
DESCRIPTORS: ACHING;SHARP
DESCRIPTORS: DISCOMFORT
DESCRIPTORS: ACHING;STABBING
DESCRIPTORS: ACHING;SHARP

## 2020-07-07 ASSESSMENT — PAIN DESCRIPTION - LOCATION
LOCATION: LEG
LOCATION: KNEE

## 2020-07-07 NOTE — PROGRESS NOTES
Per , pts daughter called and said that the night that she fell, she may have been trying to kill herself. Per daughter, she found several empty pill bottles laying around and she has been going through a lot lately, including the loss of her brother. Will ask pt if she has had any thoughts of harming herself and will make MD aware.

## 2020-07-07 NOTE — CARE COORDINATION
Case Management Assessment  Initial Evaluation    Date/Time of Evaluation: 7/7/2020 3:00 PM  Assessment Completed by: Jeremy Ochoa    Patient Name: Evelyn Hathaway  YOB: 1955  Diagnosis: Closed bicondylar fracture of distal femur, right, initial encounter [facility]) [T81.516L, [address]  Date / Time: 7/5/2020  2:18 PM  Admission status/Date: 07/05/20  Chart Reviewed: Yes      Patient Interviewed: Yes   Family Interviewed:  No      Hospitalization in the last 30 days:  No    Contacts  :     Relationship to Patient:   Phone Number:    Alternate Contact:     Relationship to Patient:     Phone Number:    Met with:    Current PCP  [address] required for SNF : Y,     3 night stay required - [employer]  Support Systems: Family Members  Transportation: self    Meal Preparation: self    Housing  Home Environment: house  Steps: 1  Plans to Return to Present Housing: Yes  Other Identified Issues: Guevara Conley  Currently active with [address] : No  Type of Home Care Services: None  Passport/Waiver : No  :                      Phone Number:    Passport/Waiver Services: [address] Provider: n/a  Equipment: n/aWalker_x__Cane_x__RTS___ BSC___Shower Chair___  02__ at ____Liter(s)---Uses________HHN___ CPAP___ BiPap___ Hospital Bed___W/C____Other________      Has Home O2 in place on admit:  No    If above answer is No ---is pt presently on O2 during hospitalization:  No    Community Service Affiliation  Dialysis:  No    · Name:  · Location  · Dialysis Schedule:  · Phone:   · Fax: Outpatient PT/OT: No    Cancer Center: No     CHF Clinic: No     Pulmonary Rehab: No  Pain Clinic: Yes - Pain management   Community Mental Health: No    Wound Clinic: No     COVID SCREENING: No       Other: n/a    The Plan for Transition of Care is related to the following treatment goals: return home with mother/son.  [facility]    The Patient and/or patient representative Pt was provided with a choice of provider and agrees   with the discharge plan. [x] Yes [] No    DISCHARGE PLAN: Chart reviewed. Femur Fx. Met with Pt at bedside. Lives with Mother (Dementia) and Son. Pt/ot rec snf. Pt declines. Is active with Pain clinic. Plan is home with [facility]. Discussed with Physical therapy. CM will follow. Explained Case Management role/services.

## 2020-07-07 NOTE — FLOWSHEET NOTE
07/07/20 1341   Pain Assessment   Pain Assessment 0-10   Pain Level 9   Pain Type Surgical pain   Pain Location Leg   Pain Orientation Right   Pain Descriptors Aching; Sharp   Pain Frequency Continuous   Pain Onset On-going   Clinical Progression Gradually worsening   Unit clerk received call from pt's daughter while staff was at lunch and she voiced concern that she felt pt may have attempted to commit suicide as she had found 3 empty pill bottles near patient. No number available to call daughter. Patient up in recliner requesting to lay back down. Assist of 2 required to transfer patient from chair to bed. Patient yelling out in pain during transfer. Rating pain 9/10. Received PRN Percocet 2 tabs per order. Patient making statements such as \"I can't keep going like this. \" Asked patient if she had any thoughts of wanting to harm herself or suicidal thoughts and she denies any thoughts at this time. Currently resting in bed. Call light within reach. Fall precautions in place. Made Dr. Orestes Jenkins aware of situation.

## 2020-07-07 NOTE — PLAN OF CARE
Problem: Falls - Risk of:  Goal: Will remain free from falls  Description: Will remain free from falls  Outcome: Ongoing  Goal: Absence of physical injury  Description: Absence of physical injury  Outcome: Ongoing     Problem: Pain:  Description: Pain management should include both nonpharmacologic and pharmacologic interventions. Goal: Pain level will decrease  Description: Pain level will decrease  Outcome: Ongoing  Goal: Control of acute pain  Description: Control of acute pain  Outcome: Ongoing  Goal: Control of chronic pain  Description: Control of chronic pain  Outcome: Ongoing     Problem: SAFETY  Goal: Free from accidental physical injury  Outcome: Ongoing  Goal: Free from intentional harm  Outcome: Ongoing     Problem: DAILY CARE  Goal: Daily care needs are met  Outcome: Ongoing     Problem: PAIN  Goal: Patient's pain/discomfort is manageable  Outcome: Ongoing     Problem: SKIN INTEGRITY  Goal: Skin integrity is maintained or improved  Outcome: Ongoing     Problem: KNOWLEDGE DEFICIT  Goal: Patient/S.O. demonstrates understanding of disease process, treatment plan, medications, and discharge instructions.   Outcome: Ongoing     Problem: DISCHARGE BARRIERS  Goal: Patient's continuum of care needs are met  Outcome: Ongoing     Problem: Skin Integrity:  Goal: Will show no infection signs and symptoms  Description: Will show no infection signs and symptoms  Outcome: Ongoing  Goal: Absence of new skin breakdown  Description: Absence of new skin breakdown  Outcome: Ongoing

## 2020-07-07 NOTE — PROGRESS NOTES
Patient with increased c/o pain to right leg during personal care. Yelling out in pain and discomfort. Rating 9/10. /68. Administered PRN Morphine per order.

## 2020-07-07 NOTE — PLAN OF CARE
Problem: Falls - Risk of:  Goal: Will remain free from falls  Description: Will remain free from falls  7/7/2020 1610 by Douglas Blackburn RN  Outcome: Ongoing    Problem: Pain:  Goal: Pain level will decrease  Description: Pain level will decrease  7/7/2020 1610 by Douglas Blackburn RN  Outcome: Ongoing    Goal: Control of acute pain  Description: Control of acute pain  7/7/2020 1610 by Douglas Blackburn RN  Outcome: Ongoing    Problem: SAFETY  Goal: Free from accidental physical injury  7/7/2020 1610 by Douglas Blackburn RN  Outcome: Ongoing    Goal: Free from intentional harm  7/7/2020 1610 by Douglas Blackburn RN  Outcome: Ongoing       Problem: DAILY CARE  Goal: Daily care needs are met  7/7/2020 1610 by Douglas Blackburn RN  Outcome: Ongoing    Problem: PAIN  Goal: Patient's pain/discomfort is manageable  7/7/2020 1610 by Douglas Blackburn RN  Outcome: Ongoing       Problem: SKIN INTEGRITY  Goal: Skin integrity is maintained or improved  7/7/2020 1610 by Douglas Blackburn RN  Outcome: Ongoing       Problem: KNOWLEDGE DEFICIT  Goal: Patient/S.O. demonstrates understanding of disease process, treatment plan, medications, and discharge instructions.   7/7/2020 1610 by Douglas Blackburn RN  Outcome: Ongoing       Problem: DISCHARGE BARRIERS  Goal: Patient's continuum of care needs are met  7/7/2020 1610 by Douglas Blackburn RN  Outcome: Ongoing    Problem: Skin Integrity:  Goal: Will show no infection signs and symptoms  Description: Will show no infection signs and symptoms  7/7/2020 1610 by Douglas Blackburn RN  Outcome: Ongoing

## 2020-07-07 NOTE — PROGRESS NOTES
Shift assessment complete; see flow sheet. Scheduled medications administered; See MAR. IV infusing without difficulty. O2 3 LPM nc in place. Pt c/o R leg pain rating 6/10 PRN percocet given at this time. Pt has knee wrapped in Ace bandage to foot. Pulses present and can flex and extend foot well with little pain. Pt denies any needs at this time. Pt educated on use of call light and to call out with needs, verbalized understanding, bed in low locked position for pt safety.  Bed alarm on

## 2020-07-07 NOTE — PROGRESS NOTES
no murmurs or gallops  Abdomen:  Soft, undistended, non tender, no organomegaly, BS present  Extremities: right LE ace wrap,right knee immobilizer  Neurological : oriented x3     Lab Data:  CBC:   Recent Labs     07/05/20  1433 07/07/20  0606   WBC 8.1 6.6   RBC 4.32 2.70*   HGB 12.8 8.0*   HCT 39.0 24.6*   MCV 90.4 91.0   RDW 13.7 13.7    150     BMP:   Recent Labs     07/05/20  1433 07/06/20  0544 07/07/20  0606    136 134*   K 4.3 4.4 3.9    102 103   CO2 23 22 25   BUN 10 10 7   CREATININE 0.9 0.7 0.6     BNP: No results for input(s): BNP in the last 72 hours. PT/INR: No results for input(s): PROTIME, INR in the last 72 hours. APTT:No results for input(s): APTT in the last 72 hours. CARDIAC ENZYMES: No results for input(s): CKMB, CKMBINDEX, TROPONINI in the last 72 hours. Invalid input(s): CKTOTAL;3  FASTING LIPID PANEL:  Lab Results   Component Value Date    CHOL 152 08/21/2018    HDL 81 08/21/2018    TRIG 93 08/21/2018     LIVER PROFILE:   Recent Labs     07/05/20  1433   AST 13*   ALT 6*   BILITOT 0.3   ALKPHOS 86          CT HEAD WO CONTRAST   Final Result   No acute intracranial abnormality.           XR KNEE RIGHT (3 VIEWS)   Final Result   Displaced, comminuted periprosthetic fracture of the distal femoral   metaphysis at the proximal margin of the femoral component of the right knee   prosthesis.          Assessment & Plan:     Patient Active Problem List    Diagnosis Date Noted    Closed fracture of right distal femur ([address]     Chronic obstructive pulmonary disease (HCC)     Narcotic dependence ([address]     Closed bicondylar fracture of distal femur, right, initial encounter [address] [address] 07/05/2020     1) Right femur periprosthetic fracture  - Ortho consulted  - prn IV morphine- dose increased due to severe pain   s/p ORIF Right intraarticular comminuted distal femoral fracture 7/6     2) COPD  - Continue patient's inhalers  - stable     3) GERD  - continue PPI    4.chronic pain with narcotic dependence    HLD.  Continue lipitor    ABLA  Will follow     Hypoxia   IS         DVT Prophylaxis: lovenox  Diet: general.  Code Status: Full Code        Dispo - inpt         Robet Claude

## 2020-07-07 NOTE — PROGRESS NOTES
Pt arrived back to room at this time. VSS. Pt given a lunch box and a drink. No c/o pain at this time.

## 2020-07-07 NOTE — PROGRESS NOTES
Handoff report and transfer of care given at bedside to [address] [place] [address]. Patient in stable condition, denies needs/concerns at this time. Call light within reach.

## 2020-07-07 NOTE — PROGRESS NOTES
Yes  Pt. Required assistance from family for: Cleaning and Dressing- son assisted with cooking and cleaning. Son does the yard work   Pt. independent for transfers and gait and walked with No Device  History of falls No    Pain  Yes  Ratin/10  Location:Rt LE   Pain Medicine Status: Received pain med prior to tx      Cognition    A&O x4   Able to follow 2 step commands    Subjective- pt anxious   Patient lying supine in bed with no family present. Pt agreeable to this OT eval & tx. Upper Extremity ROM:    WFL    Upper Extremity Strength:    WFL    Upper Extremity Sensation    WFL    Upper Extremity Proprioception:  WFL    Coordination and Tone  WFL    Balance  Functional Sitting Balance:  WFL  Functional Standing Balance:Impaired    Bed mobility:    Supine to sit:   Min A  and 2 persons  Sit to supine:   Not Tested  Rolling:    Not Tested  Scooting in sitting:  Min A   Scooting to head of bed:   Not Tested    Bridging:   Not Tested    Transfers:    Sit to stand:  Min assist of two to the RW , Pt unable to use the stedy for sit to stand  Stand to sit:  Min A  and 2 persons  Bed to chair:   Min A , 2 persons and RW  Standard toilet: Not Tested  Bed to [facility]:  Not Tested    Dressing:      UE:   Not Tested  LE:    Max A  to don socks    Bathing:    UE:  IND for bathing  LE:  Not Tested    Eating:   Independent    Toileting:  Not Tested    Activity Tolerance   Pt completed therapy session with Pain noted with movement     Positioning Needs:   Pt reclined in chair and .fr, alarm set, positioned in proper neutral alignment and pressure relief provided. Exercise / Activities Initiated:   N/A    Patient/Family Education:   Role of OT  Recommendations for DC    Assessment of Deficits: Pt seen for Occupational therapy evaluation in acute care setting. Pt demonstrated decreased Activity tolerance, ADLs, Balance , Bed mobility, Safety Awareness and Transfers.  Pt functioning below baseline and will likely benefit from skilled occupational therapy services to maximize safety and independence. Goal(s) : To be met in 3 Visits:  1). Bed to toilet/BSC: CGA and RW    To be met in 5 Visits:  1). Supine to/from Sit:  CGA  2). Upper Body Bathing:   Modified Independent  3). Lower Body Bathing: Mod A   4). Upper Body Dressing:  Modified Independent  5). Lower Body Dressing: Mod A   6). Pt to demonstrate UE exs x 15 reps with minimal cues    Rehabilitation Potential:  Good for goals listed above. Strengths for achieving goals include: PLOF  Barriers to achieving goals include:  Complexity of condition     Plan: To be seen 5 x/wk while in acute care setting for therapeutic exercises, bed mobility, transfers, dressing, bathing, family/patient education, ADL/IADL retraining, energy conservation training.    Anyi Lakhani OTR/L [id number]          If patient discharges from this facility prior to next visit, this note will serve as the Discharge Summary

## 2020-07-08 ENCOUNTER — APPOINTMENT (OUTPATIENT)
Dept: GENERAL RADIOLOGY | Age: 65
DRG: 480 | End: 2020-07-08
Payer: MEDICARE

## 2020-07-08 LAB
ANION GAP SERPL CALCULATED.3IONS-SCNC: 9 MMOL/L (ref 3–16)
BUN BLDV-MCNC: 6 MG/DL (ref 7–20)
CALCIUM SERPL-MCNC: 8.1 MG/DL (ref 8.3–10.6)
CHLORIDE BLD-SCNC: 106 MMOL/L (ref 99–110)
CO2: 27 MMOL/L (ref 21–32)
CREAT SERPL-MCNC: <0.5 MG/DL (ref 0.6–1.2)
GFR AFRICAN AMERICAN: >60
GFR NON-AFRICAN AMERICAN: >60
GLUCOSE BLD-MCNC: 116 MG/DL (ref 70–99)
HCT VFR BLD CALC: 23.3 % (ref 36–48)
HEMOGLOBIN: 7.6 G/DL (ref 12–16)
MCH RBC QN AUTO: 29.6 PG (ref 26–34)
MCHC RBC AUTO-ENTMCNC: 32.5 G/DL (ref 31–36)
MCV RBC AUTO: 90.9 FL (ref 80–100)
PDW BLD-RTO: 13.6 % (ref 12.4–15.4)
PLATELET # BLD: 155 K/UL (ref 135–450)
PMV BLD AUTO: 7.5 FL (ref 5–10.5)
POTASSIUM SERPL-SCNC: 3.6 MMOL/L (ref 3.5–5.1)
RBC # BLD: 2.56 M/UL (ref 4–5.2)
SODIUM BLD-SCNC: 142 MMOL/L (ref 136–145)
WBC # BLD: 5.8 K/UL (ref 4–11)

## 2020-07-08 PROCEDURE — 97530 THERAPEUTIC ACTIVITIES: CPT

## 2020-07-08 PROCEDURE — 1200000000 HC SEMI PRIVATE

## 2020-07-08 PROCEDURE — 6360000002 HC RX W HCPCS: Performed by: ORTHOPAEDIC SURGERY

## 2020-07-08 PROCEDURE — 36415 COLL VENOUS BLD VENIPUNCTURE: CPT

## 2020-07-08 PROCEDURE — 85027 COMPLETE CBC AUTOMATED: CPT

## 2020-07-08 PROCEDURE — 80048 BASIC METABOLIC PNL TOTAL CA: CPT

## 2020-07-08 PROCEDURE — 2700000000 HC OXYGEN THERAPY PER DAY

## 2020-07-08 PROCEDURE — 6370000000 HC RX 637 (ALT 250 FOR IP): Performed by: NURSE PRACTITIONER

## 2020-07-08 PROCEDURE — 94761 N-INVAS EAR/PLS OXIMETRY MLT: CPT

## 2020-07-08 PROCEDURE — 6370000000 HC RX 637 (ALT 250 FOR IP): Performed by: ORTHOPAEDIC SURGERY

## 2020-07-08 PROCEDURE — 2580000003 HC RX 258: Performed by: ORTHOPAEDIC SURGERY

## 2020-07-08 PROCEDURE — 97535 SELF CARE MNGMENT TRAINING: CPT

## 2020-07-08 PROCEDURE — 97116 GAIT TRAINING THERAPY: CPT

## 2020-07-08 PROCEDURE — 71045 X-RAY EXAM CHEST 1 VIEW: CPT

## 2020-07-08 PROCEDURE — 99232 SBSQ HOSP IP/OBS MODERATE 35: CPT | Performed by: INTERNAL MEDICINE

## 2020-07-08 PROCEDURE — 94640 AIRWAY INHALATION TREATMENT: CPT

## 2020-07-08 RX ORDER — IPRATROPIUM BROMIDE AND ALBUTEROL SULFATE 2.5; .5 MG/3ML; MG/3ML
1 SOLUTION RESPIRATORY (INHALATION) EVERY 6 HOURS PRN
Status: DISCONTINUED | OUTPATIENT
Start: 2020-07-08 | End: 2020-07-10 | Stop reason: HOSPADM

## 2020-07-08 RX ORDER — IPRATROPIUM BROMIDE AND ALBUTEROL SULFATE 2.5; .5 MG/3ML; MG/3ML
1 SOLUTION RESPIRATORY (INHALATION) EVERY 6 HOURS
Status: DISCONTINUED | OUTPATIENT
Start: 2020-07-08 | End: 2020-07-08

## 2020-07-08 RX ADMIN — Medication 2 PUFF: at 13:22

## 2020-07-08 RX ADMIN — PANTOPRAZOLE SODIUM 40 MG: 40 TABLET, DELAYED RELEASE ORAL at 08:44

## 2020-07-08 RX ADMIN — Medication 2 PUFF: at 07:50

## 2020-07-08 RX ADMIN — TRAZODONE HYDROCHLORIDE 100 MG: 100 TABLET ORAL at 20:10

## 2020-07-08 RX ADMIN — OXYCODONE HYDROCHLORIDE AND ACETAMINOPHEN 2 TABLET: 5; 325 TABLET ORAL at 08:45

## 2020-07-08 RX ADMIN — SENNOSIDES AND DOCUSATE SODIUM 1 TABLET: 8.6; 5 TABLET ORAL at 08:44

## 2020-07-08 RX ADMIN — MORPHINE SULFATE 4 MG: 4 INJECTION, SOLUTION INTRAMUSCULAR; INTRAVENOUS at 12:01

## 2020-07-08 RX ADMIN — ESCITALOPRAM OXALATE 10 MG: 10 TABLET ORAL at 08:45

## 2020-07-08 RX ADMIN — ATORVASTATIN CALCIUM 40 MG: 40 TABLET, FILM COATED ORAL at 08:45

## 2020-07-08 RX ADMIN — OXYCODONE HYDROCHLORIDE AND ACETAMINOPHEN 2 TABLET: 5; 325 TABLET ORAL at 20:10

## 2020-07-08 RX ADMIN — ENOXAPARIN SODIUM 40 MG: 40 INJECTION SUBCUTANEOUS at 08:44

## 2020-07-08 RX ADMIN — OXYCODONE HYDROCHLORIDE AND ACETAMINOPHEN 2 TABLET: 5; 325 TABLET ORAL at 03:12

## 2020-07-08 RX ADMIN — GABAPENTIN 300 MG: 300 CAPSULE ORAL at 20:10

## 2020-07-08 RX ADMIN — Medication 2 PUFF: at 19:12

## 2020-07-08 RX ADMIN — Medication 10 ML: at 08:48

## 2020-07-08 RX ADMIN — Medication 10 ML: at 20:51

## 2020-07-08 RX ADMIN — OXYCODONE HYDROCHLORIDE AND ACETAMINOPHEN 2 TABLET: 5; 325 TABLET ORAL at 16:34

## 2020-07-08 RX ADMIN — SENNOSIDES AND DOCUSATE SODIUM 1 TABLET: 8.6; 5 TABLET ORAL at 20:10

## 2020-07-08 RX ADMIN — GABAPENTIN 300 MG: 300 CAPSULE ORAL at 08:45

## 2020-07-08 RX ADMIN — PAROXETINE HYDROCHLORIDE 40 MG: 20 TABLET, FILM COATED ORAL at 08:45

## 2020-07-08 ASSESSMENT — PAIN DESCRIPTION - PROGRESSION
CLINICAL_PROGRESSION: GRADUALLY WORSENING

## 2020-07-08 ASSESSMENT — PAIN DESCRIPTION - FREQUENCY
FREQUENCY: INTERMITTENT
FREQUENCY: CONTINUOUS
FREQUENCY: CONTINUOUS

## 2020-07-08 ASSESSMENT — PAIN DESCRIPTION - ONSET
ONSET: GRADUAL
ONSET: ON-GOING
ONSET: ON-GOING

## 2020-07-08 ASSESSMENT — PAIN DESCRIPTION - LOCATION
LOCATION: KNEE
LOCATION: LEG
LOCATION: KNEE

## 2020-07-08 ASSESSMENT — PAIN SCALES - GENERAL
PAINLEVEL_OUTOF10: 8
PAINLEVEL_OUTOF10: 7
PAINLEVEL_OUTOF10: 4
PAINLEVEL_OUTOF10: 8
PAINLEVEL_OUTOF10: 8
PAINLEVEL_OUTOF10: 7
PAINLEVEL_OUTOF10: 8
PAINLEVEL_OUTOF10: 5
PAINLEVEL_OUTOF10: 8
PAINLEVEL_OUTOF10: 7
PAINLEVEL_OUTOF10: 3

## 2020-07-08 ASSESSMENT — PAIN DESCRIPTION - ORIENTATION
ORIENTATION: RIGHT

## 2020-07-08 ASSESSMENT — PAIN - FUNCTIONAL ASSESSMENT
PAIN_FUNCTIONAL_ASSESSMENT: ACTIVITIES ARE NOT PREVENTED
PAIN_FUNCTIONAL_ASSESSMENT: PREVENTS OR INTERFERES SOME ACTIVE ACTIVITIES AND ADLS
PAIN_FUNCTIONAL_ASSESSMENT: PREVENTS OR INTERFERES SOME ACTIVE ACTIVITIES AND ADLS

## 2020-07-08 ASSESSMENT — PAIN DESCRIPTION - PAIN TYPE
TYPE: SURGICAL PAIN;ACUTE PAIN
TYPE: SURGICAL PAIN
TYPE: SURGICAL PAIN

## 2020-07-08 ASSESSMENT — PAIN DESCRIPTION - DESCRIPTORS
DESCRIPTORS: ACHING;DISCOMFORT
DESCRIPTORS: ACHING;DISCOMFORT
DESCRIPTORS: ACHING

## 2020-07-08 NOTE — FLOWSHEET NOTE
07/08/20 1205   Encounter Summary   Services provided to: Patient   Referral/Consult From: [facility] Parent; Children   Place of [facility]   Continue Visiting   (7/8 Tel.-will d/c to [employer] serv. online;bless)   Complexity of Encounter Moderate   Length of Encounter 15 minutes   Spiritual Assessment Completed Yes   Routine   Type Initial   Assessment Approachable;Coping   Intervention Discussed illness/injury and it's impact; Discussed belief system/Mormon practices/nazanin; Explored feelings, thoughts, concerns;Explored coping resources; Watts   Outcome Comfort;Expressed gratitude

## 2020-07-08 NOTE — PROGRESS NOTES
Addended by: Neal Edouard on: 1/22/2019 08:53 PM     Modules accepted: Orders Admit: 2020    Name:  Waleska Novak  Room:  0214/0214-01  MRN:    [id number]     Daily Progress Note for 2020     Interval History:     Underwent ORIF Right intraarticular comminuted distal femoral fracture     Says pain not under control    Continues to be on 2 L of O2    Scheduled Meds:   PARoxetine  40 mg Oral QAM    traZODone  100 mg Oral Nightly    sodium chloride flush  10 mL Intravenous 2 times per day    sennosides-docusate sodium  1 tablet Oral BID    atorvastatin  40 mg Oral Daily    escitalopram  10 mg Oral Daily    pantoprazole  40 mg Oral Daily    budesonide-formoterol  2 puff Inhalation BID    gabapentin  300 mg Oral BID    enoxaparin  40 mg Subcutaneous Daily    albuterol sulfate HFA  2 puff Inhalation TID       Continuous Infusions:      PRN Meds:  oxyCODONE-acetaminophen **OR** oxyCODONE-acetaminophen, morphine, sodium chloride flush, magnesium hydroxide, acetaminophen **OR** acetaminophen, polyethylene glycol, promethazine **OR** ondansetron, albuterol sulfate HFA                  Objective:     Temp  Av °F (37.2 °C)  Min: 98.3 °F (36.8 °C)  Max: 100 °F (37.8 °C)  Pulse  Av  Min: 83  Max: 96  BP  Min: 100/63  Max: 130/68  SpO2  Av.8 %  Min: 90 %  Max: 96 %  Patient Vitals for the past 4 hrs:   BP Temp Temp src Pulse Resp SpO2   20 0815 109/62 99.4 °F (37.4 °C) Oral 94 18 90 %   20 0755 -- -- -- -- 16 92 %         Intake/Output Summary (Last 24 hours) at 2020 0848  Last data filed at 2020 1618  Gross per 24 hour   Intake 480 ml   Output 1100 ml   Net -620 ml       Physical Exam:  General:  Awake, alert and oriented.  Appears to be not in any distress  Mucous Membranes:  Pink , anicteric  Neck: No JVD, no carotid bruit, no thyromegaly  Chest:  Clear to auscultation bilaterally, no added sounds  Cardiovascular:  RRR S1S2 heard, no murmurs or gallops  Abdomen:  Soft, undistended, non tender, no organomegaly, BS present  Extremities: right LE ace wrap,right knee immobilizer  Neurological : oriented x3     Lab Data:  CBC:   Recent Labs     07/05/20  1433 07/07/20  0606 07/08/20  0529   WBC 8.1 6.6 5.8   RBC 4.32 2.70* 2.56*   HGB 12.8 8.0* 7.6*   HCT 39.0 24.6* 23.3*   MCV 90.4 91.0 90.9   RDW 13.7 13.7 13.6    150 155     BMP:   Recent Labs     07/06/20  0544 07/07/20  0606 07/08/20  0529    134* 142   K 4.4 3.9 3.6    103 106   CO2 22 25 27   BUN 10 7 6*   CREATININE 0.7 0.6 <0.5*     BNP: No results for input(s): BNP in the last 72 hours. PT/INR: No results for input(s): PROTIME, INR in the last 72 hours. APTT:No results for input(s): APTT in the last 72 hours. CARDIAC ENZYMES: No results for input(s): CKMB, CKMBINDEX, TROPONINI in the last 72 hours. Invalid input(s): CKTOTAL;3  FASTING LIPID PANEL:  Lab Results   Component Value Date    CHOL 152 08/21/2018    HDL 81 08/21/2018    TRIG 93 08/21/2018     LIVER PROFILE:   Recent Labs     07/05/20  1433   AST 13*   ALT 6*   BILITOT 0.3   ALKPHOS 86          CT HEAD WO CONTRAST   Final Result   No acute intracranial abnormality.           XR KNEE RIGHT (3 VIEWS)   Final Result   Displaced, comminuted periprosthetic fracture of the distal femoral   metaphysis at the proximal margin of the femoral component of the right knee   prosthesis. Assessment & Plan:     Patient Active Problem List    Diagnosis Date Noted    Cl[facility] right distal [facility] [address]     Chronic obstructive pulmonary disease (HCC)     Narcotic dependence ([address]     Closed bicondylar fracture of distal femur, right, initial encounter ([facility] 07/05/2020     1) Right femur periprosthetic fracture  - Ortho consulted  - prn IV morphine- dose increased due to severe pain   s/p ORIF Right intraarticular comminuted distal femoral fracture 7/6     2) COPD   n oexacerbation  - Continue patient's inhalers  - stable  Add duonebs     3) GERD  - continue PPI    4.chronic pain with narcotic dependence    HLD.  Continue lipitor    ABLA  Will follow     Acute hypoxic resp failure   H/o COPD   IS   CXR   Duonebs        DVT Prophylaxis: lovenox  Diet: general.  Code Status: Full Code        Dispo - inpt    Ot/pt   Will need SNF [facility]

## 2020-07-08 NOTE — PROGRESS NOTES
Occupational Therapy Daily Treatment Note    Unit: [address] [place]  Date:  7/8/2020  Patient Name:    Christiano Andrade  Admitting diagnosis:  Closed bicondylar fracture of distal femur, right, initial encounter [facility]) [P50.731T, [address]  Admit Date:  7/5/2020  Precautions/Restrictions:    Fall risk, Bed/chair alarm and Lines -Supplemental O2 (2) and Purewick catheter  NWB on the Rt LE   per Ortho - Physical therapy as per protocol -nonweightbearing right lower extremity, keep knee immobilizer in place while ambulating.  When resting in bed may remove knee immobilizer and work on very gentle range of motion 0 to 30 degrees.     RIGHT FEMUR OPEN REDUCTION INTERNAL FIXATION 7-6-20      Pt with prior TKR on the right and left side. Discharge Recommendations: SNF  DME needs for discharge: defer to facility       Therapy recommendations for staff:   Assist of 2 with use of rolling walker (RW) for all transfers to/from [facility]  to/from chair    AM-PAC Score: AM-PAC Inpatient Daily Activity Raw Score: 17  Home Health S4 Level: NA       Treatment Time:  [id number]   Treatment number:  2    Timed code treatment minutes: 47 minutes  Total treatment minutes:   47 minutes    History of Present Illness:   Per H&P Vikas JIMENES   62 y. o. female the patient was standing up from cleaning desk when she got her feet twisted and fell.  She states her knee twisted in an awkward angle she felt immediate pain.  She states that she has had severe pain ever since this happened and is unable to bear weight.    Subjective: Pt in the bed and willing to work with OT.  The pt more receptive to considering Rehab    Pain   Yes  Rating: moderate  Location:Rt LE  Pain Medicine Status: RN notified      Bed Mobility:   Supine to Sit:  CGA with VC   Sit to Supine:  Not Tested  Rolling:           Not Tested  Scooting:        SBA    Transfer Training:   Sit to stand:   Min A , min assist to CGA of second person to maintain NWB   Stand to sit:  Min A  with VC on hand

## 2020-07-08 NOTE — CARE COORDINATION
INTERDISCIPLINARY PLAN OF CARE CONFERENCE    Date/Time: 7/8/2020 11:28 AM  Completed by: Jorge Dawson, Case Management      Patient Name:  Lizet Bacon  YOB: 1955  Admitting Diagnosis: Closed bicondylar fracture of distal femur, right, initial encounter [facility]) [E76.564Z, [address]     Admit Date/Time:  7/5/2020  2:18 PM    Chart reviewed. Interdisciplinary team met to discuss patient progress and discharge plans. Disciplines included Case Management, Nursing, and Dietitian. Current Status: IP 07/05/2020    PT/OT recommendation:SNF    Anticipated Discharge Date: ready for DC  Expected D/C Disposition:  Skilled nursing facility  Confirmed plan with patient   Discharge Plan Comments: Chart reviewed. Met with pt at bedside and explained the role of the CM. The Plan for Transition of Care is related to the following treatment goals: to return home. Agreeable now to STR and would prefer #1[facility] and #2 [place]. Referral sent to [facility] at the [facility] for review. Will need TIFFANY pre-cert if accepted. +CM following    The Patient was provided with a choice of provider and agrees   with the discharge plan. [x] Yes [] No    Freedom of choice list was provided with basic dialogue that supports the patient's individualized plan of care/goals, treatment preferences and shares the quality data associated with the providers.  [x] Yes [] No    Home O2 in place on admit: No- currently saturating 90 on 2 liters

## 2020-07-08 NOTE — PROGRESS NOTES
Handoff report and transfer of care given at bedside to [facility]. Patient in stable condition, denies needs/concerns at this time. Call light within reach.

## 2020-07-08 NOTE — PROGRESS NOTES
Inpatient Physical Therapy Daily Treatment Note    Unit: [address]  Date:  7/8/2020  Patient Name:    Bari Calvo  Admitting diagnosis:  Closed bicondylar fracture of distal femur, right, initial encounter [facility]) [F60.329G, [address]  Admit Date:  7/5/2020  Precautions/Restrictions:  Fall risk, Bed/chair alarm, Lines -Supplemental O2 (2L) and Purewick catheter, WB Restrictions (NWB R LE) and Knee immobilizer    Physical therapy as per protocol -nonweightbearing right lower extremity, keep knee immobilizer in place while ambulating.  When resting in bed may remove knee immobilizer and work on very gentle range of motion 0 to 30 degrees. Discharge Recommendations: SNF  DME needs for discharge: defer to facility       Therapy recommendation for EMS Transport: can transport by wheelchair with elevating leg rest    Therapy recommendations for staff:   Assist of 2 with use of rolling walker (RW), knee immobilizer and gait belt for all transfers and ambulation to/from [facility]  to/from chair    History of Present Illness: 61 yo female admitted to [facility] on 7/5/20 after fall at home. X-ray showed bicondylar fx of distal femur. Pt with prior R TKA. Pt underwent R femur ORIF on 7/6/20.      Physical therapy as per protocol -nonweightbearing right lower extremity, keep knee immobilizer in place while ambulating.  When resting in bed may remove knee immobilizer and work on very gentle range of motion 0 to 30 degrees. Home Health S4 Level Recommendation: NA  AM-PAC Mobility Score   AM-PAC Inpatient Mobility Raw Score : 16       Treatment Time:  09:43-10:25  Treatment number: 2  Timed Code Treatment Minutes: 42 minutes  Total Treatment Minutes:  42  minutes    Cognition    A&O orientation not directly assessed. Able to follow 2 step commands    Subjective  Patient lying supine in bed with no family present   Pt agreeable to this PT tx.      Pain   Yes  Location: R LE  Rating:    moderate/10  Pain Medicine Status: Pain med requested, Received pain med prior to tx and RN notified , ice applied to knee at end of session    Bed Mobility   Supine to Sit:    SBA with cues to  knee immobilizer to assist with moving R LE, increased time  Sit to Supine:   Not Tested  Rolling:   Not Tested  Scooting:   SBA (pt  knee immobilizer to move R LE in bed, and uses L LE hooked under R LE to support knee immobilizer in chair)    Transfer Training     Sit to stand:   Min A from bed and BSC with CGA-min A for maintaining R LE NWB  Stand to sit:   Min A to BSC and recliner with CGA-min A for maintaining R LE NWB  Bed to Chair:   CGA with use of gait belt, rolling walker (RW) and knee immobilizer on right, with CGA-min A for maintaining R LE NWB  Pt required moderate VCs for hand placement with transfers initially, improved to only min VCs by end of session    Gait Training gait completed as indicated below  Distance:      3 ft  Deviations (firm surface/linoleum):  decreased lizbeth, decreased step length on left and hop-to pattern on L LE  Assistive Device Used:    gait belt, rolling walker (RW) and Rollator  Level of Assist:    CGA, and CGA-min A for maintaining R LE NWB  Comment: pt improved with maintaining NWB R LE with increased distance, cues for sequencing gait pattern and focus on use of UEs to off load L LE vs hopping    Stair Training deferred, pt unsafe/not appropriate to complete stairs at this time and does not have stairs in home environment    Therapeutic Exercise all completed bilaterally unless indicated  Ankle Pumps x 10 reps   AAROM x10 reps (~5-15 deg, limited by pain)  Educated on [employer] and hip adduction sets (10 reps each, given for HEP)    Balance  Sitting:  Good - ; Supervision  Comments: requires intermittent assist for positioning of R LE    Standing: Fair +; CGA  Comments: moderate sway with unilateral support on RW for donning/doffing pants, intermittent assist with maintaining R LE NWB    Patient Education and no more than CGA with indep maintenance of R LE NWB      Plan   Continue with plan of care. Signature: Lali Stack, PT, DPT #[id number]    If patient discharges from this facility prior to next visit, this note will serve as the Discharge Summary.

## 2020-07-08 NOTE — PROGRESS NOTES
Pt resting in bed. Awake alert and oriented. Low grade temp of 99.4, occasional np cough, right ace wrap and knee immobilizer noted. 02 at 2lnc. Pt has no complaints or needs noted. Call light within reach. Will continue to monitor.

## 2020-07-08 NOTE — PROGRESS NOTES
Department of Orthopedic Surgery  Nurse Practitioner   Progress Note    Subjective:     POD #2 right distal femur periprosthetic fracture ORIF  Systemic or Specific Complaints: Patient sitting up in the chair after working with therapy. Pain better controlled this morning with 10 mg Percocet. Patient had been refusing SNF, discussed with her and she is open to it at this time. Therapy at bedside, no family at bedside. Objective:     Patient Vitals for the past 24 hrs:   BP Temp Temp src Pulse Resp SpO2   07/08/20 0815 109/62 99.4 °F (37.4 °C) Oral 94 18 90 %   07/08/20 0755 -- -- -- -- 16 92 %   07/08/20 0315 120/63 98.4 °F (36.9 °C) Oral 83 16 92 %   07/07/20 2050 100/63 98.3 °F (36.8 °C) Oral 86 16 94 %   07/07/20 1905 -- -- -- -- 16 93 %   07/07/20 1615 (!) 109/58 100 °F (37.8 °C) Oral 96 16 90 %   07/07/20 1306 -- -- -- -- -- 96 %   07/07/20 1151 (!) 100/59 99 °F (37.2 °C) Oral 86 18 95 %       General: alert, appears stated age, cooperative and no distress   Wound: Wound clean and dry no evidence of infection. ACE, KI   Motion: deferred in affected extremity   DVT Exam: No evidence of DVT seen on physical exam.     Additional exam: NVI. Moving toes, foot and ankle without difficulty. Good pulse. Data Review  CBC:   Lab Results   Component Value Date    WBC 5.8 07/08/2020    RBC 2.56 07/08/2020    HGB 7.6 07/08/2020    HCT 23.3 07/08/2020     07/08/2020       Renal:   Lab Results   Component Value Date     07/08/2020    K 3.6 07/08/2020    K 4.4 07/06/2020     07/08/2020    CO2 27 07/08/2020    BUN 6 07/08/2020    CREATININE <0.5 07/08/2020    GLUCOSE 116 07/08/2020    CALCIUM 8.1 07/08/2020            Assessment:      s/p right distal femur periprosthetic fracture ORIF    Plan:      1:  Continue current plan of care per medicine. Post-op labs reviewed and stable. Acute blood loss anemia, expected after surgery. Monitor. Remains on O2, h/o COPD. Encourage IS, deep breathing.  CXR per medicine, pending. 2:  Continue Deep venous thrombosis prophylaxis - Lovenox  3:  Continue Pain Control  4:  PT, OT, NWB RLE. KI, may remove for gentle ROM 0-30 degrees with therapy. 5:  Patient previously refusing SNF, Dr. Giancarlo Daly and I both discussed with patient today and she is open to SNF after discussion. CM following and will provide list. Patient is okay for discharge from ortho standpoint pending medical clearance and placement. I do not intend to write pain medication for patient as she had 120 tablets of 7.5 mg percocet filled 7/2, pt to resume at home. We will follow peripherally, please contact us for any ortho concerns or needs. Discharge instructions completed, follow up with Dr. Jaqui Chaves.      Pauly eZe, CNP

## 2020-07-08 NOTE — PLAN OF CARE
Problem: Falls - Risk of:  Goal: Will remain free from falls  Description: Will remain free from falls  7/8/2020 0235 by Tanisha Carrillo RN  Outcome: Ongoing  7/7/2020 1610 by Codi Sharma RN  Outcome: Ongoing  Goal: Absence of physical injury  Description: Absence of physical injury  Outcome: Ongoing     Problem: Pain:  Description: Pain management should include both nonpharmacologic and pharmacologic interventions. Goal: Pain level will decrease  Description: Pain level will decrease  7/8/2020 0235 by Tanisha Carrillo RN  Outcome: Ongoing  7/7/2020 1610 by Codi Sharma RN  Outcome: Ongoing  Goal: Control of acute pain  Description: Control of acute pain  7/8/2020 0235 by Tanisha Carrillo RN  Outcome: Ongoing  7/7/2020 1610 by Codi Sharma RN  Outcome: Ongoing  Goal: Control of chronic pain  Description: Control of chronic pain  Outcome: Ongoing     Problem: SAFETY  Goal: Free from accidental physical injury  7/8/2020 0235 by Tanisha Carrillo RN  Outcome: Ongoing  7/7/2020 1610 by Codi Sharma RN  Outcome: Ongoing  Goal: Free from intentional harm  7/8/2020 0235 by Tanisha Carrillo RN  Outcome: Ongoing  7/7/2020 1610 by Codi Sharma RN  Outcome: Ongoing     Problem: DAILY CARE  Goal: Daily care needs are met  7/8/2020 0235 by Tanisha Carrillo RN  Outcome: Ongoing  7/7/2020 1610 by Codi Sharma RN  Outcome: Ongoing     Problem: PAIN  Goal: Patient's pain/discomfort is manageable  7/8/2020 0235 by Tanisha Carrillo RN  Outcome: Ongoing  7/7/2020 1610 by Codi Sharma RN  Outcome: Ongoing     Problem: SKIN INTEGRITY  Goal: Skin integrity is maintained or improved  7/8/2020 0235 by Tanisha Carrillo RN  Outcome: Ongoing  7/7/2020 1610 by Coid Sharma RN  Outcome: Ongoing     Problem: KNOWLEDGE DEFICIT  Goal: Patient/S.O. demonstrates understanding of disease process, treatment plan, medications, and discharge instructions.   7/8/2020 0235 by Tanisha Carrillo RN  Outcome: Ongoing  7/7/2020 1610 by Codi Sharma RN  Outcome: Ongoing     Problem: DISCHARGE BARRIERS  Goal: Patient's continuum of care needs are met  7/8/2020 0235 by Parker Carrington RN  Outcome: Ongoing  7/7/2020 1610 by Ildefonso Cote RN  Outcome: Ongoing     Problem: Skin Integrity:  Goal: Will show no infection signs and symptoms  Description: Will show no infection signs and symptoms  7/8/2020 0235 by Parker Carrington RN  Outcome: Ongoing  7/7/2020 1610 by Ildefonso Cote RN  Outcome: Ongoing  Goal: Absence of new skin breakdown  Description: Absence of new skin breakdown  Outcome: Ongoing

## 2020-07-08 NOTE — PROGRESS NOTES
Shift assessment complete; see flow sheet. Scheduled medications administered; See MAR. IV flushed without difficulty. Pt c/o surgical knee pain 8/10 PRN percocet given at this time. Pt denies any needs at this time.  Pt educated on use of call light and to call out with needs, verbalized understanding, bed in low locked position for pt safety

## 2020-07-09 LAB
HCT VFR BLD CALC: 22.4 % (ref 36–48)
HEMOGLOBIN: 7.3 G/DL (ref 12–16)
MCH RBC QN AUTO: 29.5 PG (ref 26–34)
MCHC RBC AUTO-ENTMCNC: 32.7 G/DL (ref 31–36)
MCV RBC AUTO: 90.1 FL (ref 80–100)
PDW BLD-RTO: 13.9 % (ref 12.4–15.4)
PLATELET # BLD: 189 K/UL (ref 135–450)
PMV BLD AUTO: 7.3 FL (ref 5–10.5)
RBC # BLD: 2.49 M/UL (ref 4–5.2)
SARS-COV-2, NAAT: NOT DETECTED
WBC # BLD: 5.6 K/UL (ref 4–11)

## 2020-07-09 PROCEDURE — 97110 THERAPEUTIC EXERCISES: CPT

## 2020-07-09 PROCEDURE — 94761 N-INVAS EAR/PLS OXIMETRY MLT: CPT

## 2020-07-09 PROCEDURE — 94640 AIRWAY INHALATION TREATMENT: CPT

## 2020-07-09 PROCEDURE — 2700000000 HC OXYGEN THERAPY PER DAY

## 2020-07-09 PROCEDURE — 2580000003 HC RX 258: Performed by: ORTHOPAEDIC SURGERY

## 2020-07-09 PROCEDURE — 90792 PSYCH DIAG EVAL W/MED SRVCS: CPT | Performed by: NURSE PRACTITIONER

## 2020-07-09 PROCEDURE — 2580000003 HC RX 258: Performed by: INTERNAL MEDICINE

## 2020-07-09 PROCEDURE — 6370000000 HC RX 637 (ALT 250 FOR IP): Performed by: ORTHOPAEDIC SURGERY

## 2020-07-09 PROCEDURE — 97530 THERAPEUTIC ACTIVITIES: CPT

## 2020-07-09 PROCEDURE — U0002 COVID-19 LAB TEST NON-CDC: HCPCS

## 2020-07-09 PROCEDURE — 6360000002 HC RX W HCPCS: Performed by: INTERNAL MEDICINE

## 2020-07-09 PROCEDURE — 99232 SBSQ HOSP IP/OBS MODERATE 35: CPT | Performed by: INTERNAL MEDICINE

## 2020-07-09 PROCEDURE — 97535 SELF CARE MNGMENT TRAINING: CPT

## 2020-07-09 PROCEDURE — 1200000000 HC SEMI PRIVATE

## 2020-07-09 PROCEDURE — 6370000000 HC RX 637 (ALT 250 FOR IP): Performed by: NURSE PRACTITIONER

## 2020-07-09 PROCEDURE — 36415 COLL VENOUS BLD VENIPUNCTURE: CPT

## 2020-07-09 PROCEDURE — 85027 COMPLETE CBC AUTOMATED: CPT

## 2020-07-09 PROCEDURE — 97116 GAIT TRAINING THERAPY: CPT

## 2020-07-09 PROCEDURE — 6360000002 HC RX W HCPCS: Performed by: ORTHOPAEDIC SURGERY

## 2020-07-09 RX ADMIN — OXYCODONE HYDROCHLORIDE AND ACETAMINOPHEN 2 TABLET: 5; 325 TABLET ORAL at 10:15

## 2020-07-09 RX ADMIN — OXYCODONE HYDROCHLORIDE AND ACETAMINOPHEN 2 TABLET: 5; 325 TABLET ORAL at 00:50

## 2020-07-09 RX ADMIN — Medication 2 PUFF: at 07:22

## 2020-07-09 RX ADMIN — Medication 10 ML: at 21:55

## 2020-07-09 RX ADMIN — ESCITALOPRAM OXALATE 10 MG: 10 TABLET ORAL at 09:49

## 2020-07-09 RX ADMIN — OXYCODONE HYDROCHLORIDE AND ACETAMINOPHEN 2 TABLET: 5; 325 TABLET ORAL at 18:41

## 2020-07-09 RX ADMIN — AMPICILLIN SODIUM AND SULBACTAM SODIUM 1.5 G: 1; .5 INJECTION, POWDER, FOR SOLUTION INTRAMUSCULAR; INTRAVENOUS at 22:03

## 2020-07-09 RX ADMIN — AMPICILLIN SODIUM AND SULBACTAM SODIUM 1.5 G: 1; .5 INJECTION, POWDER, FOR SOLUTION INTRAMUSCULAR; INTRAVENOUS at 10:31

## 2020-07-09 RX ADMIN — Medication 2 PUFF: at 20:21

## 2020-07-09 RX ADMIN — SENNOSIDES AND DOCUSATE SODIUM 1 TABLET: 8.6; 5 TABLET ORAL at 21:55

## 2020-07-09 RX ADMIN — PAROXETINE HYDROCHLORIDE 40 MG: 20 TABLET, FILM COATED ORAL at 09:49

## 2020-07-09 RX ADMIN — Medication 2 PUFF: at 14:14

## 2020-07-09 RX ADMIN — ATORVASTATIN CALCIUM 40 MG: 40 TABLET, FILM COATED ORAL at 09:49

## 2020-07-09 RX ADMIN — PROMETHAZINE HYDROCHLORIDE 12.5 MG: 25 TABLET ORAL at 09:48

## 2020-07-09 RX ADMIN — TRAZODONE HYDROCHLORIDE 100 MG: 100 TABLET ORAL at 21:54

## 2020-07-09 RX ADMIN — SENNOSIDES AND DOCUSATE SODIUM 1 TABLET: 8.6; 5 TABLET ORAL at 09:49

## 2020-07-09 RX ADMIN — ENOXAPARIN SODIUM 40 MG: 40 INJECTION SUBCUTANEOUS at 09:49

## 2020-07-09 RX ADMIN — GABAPENTIN 300 MG: 300 CAPSULE ORAL at 21:54

## 2020-07-09 RX ADMIN — Medication 10 ML: at 09:51

## 2020-07-09 RX ADMIN — OXYCODONE HYDROCHLORIDE AND ACETAMINOPHEN 2 TABLET: 5; 325 TABLET ORAL at 14:28

## 2020-07-09 RX ADMIN — GABAPENTIN 300 MG: 300 CAPSULE ORAL at 09:49

## 2020-07-09 RX ADMIN — AMPICILLIN SODIUM AND SULBACTAM SODIUM 1.5 G: 1; .5 INJECTION, POWDER, FOR SOLUTION INTRAMUSCULAR; INTRAVENOUS at 15:47

## 2020-07-09 RX ADMIN — MORPHINE SULFATE 4 MG: 4 INJECTION, SOLUTION INTRAMUSCULAR; INTRAVENOUS at 21:55

## 2020-07-09 RX ADMIN — MORPHINE SULFATE 4 MG: 4 INJECTION, SOLUTION INTRAMUSCULAR; INTRAVENOUS at 04:05

## 2020-07-09 RX ADMIN — PANTOPRAZOLE SODIUM 40 MG: 40 TABLET, DELAYED RELEASE ORAL at 09:49

## 2020-07-09 ASSESSMENT — PAIN DESCRIPTION - ORIENTATION
ORIENTATION: RIGHT

## 2020-07-09 ASSESSMENT — PAIN DESCRIPTION - ONSET
ONSET: GRADUAL

## 2020-07-09 ASSESSMENT — PAIN SCALES - GENERAL
PAINLEVEL_OUTOF10: 7
PAINLEVEL_OUTOF10: 6
PAINLEVEL_OUTOF10: 7
PAINLEVEL_OUTOF10: 4
PAINLEVEL_OUTOF10: 9
PAINLEVEL_OUTOF10: 0
PAINLEVEL_OUTOF10: 6
PAINLEVEL_OUTOF10: 4
PAINLEVEL_OUTOF10: 7
PAINLEVEL_OUTOF10: 0
PAINLEVEL_OUTOF10: 7
PAINLEVEL_OUTOF10: 3

## 2020-07-09 ASSESSMENT — PAIN - FUNCTIONAL ASSESSMENT
PAIN_FUNCTIONAL_ASSESSMENT: ACTIVITIES ARE NOT PREVENTED
PAIN_FUNCTIONAL_ASSESSMENT: ACTIVITIES ARE NOT PREVENTED
PAIN_FUNCTIONAL_ASSESSMENT: PREVENTS OR INTERFERES SOME ACTIVE ACTIVITIES AND ADLS

## 2020-07-09 ASSESSMENT — PAIN DESCRIPTION - FREQUENCY
FREQUENCY: INTERMITTENT

## 2020-07-09 ASSESSMENT — PAIN DESCRIPTION - LOCATION
LOCATION: LEG
LOCATION: KNEE;LEG
LOCATION: LEG
LOCATION: KNEE

## 2020-07-09 ASSESSMENT — PAIN DESCRIPTION - PAIN TYPE
TYPE: SURGICAL PAIN;ACUTE PAIN
TYPE: SURGICAL PAIN

## 2020-07-09 ASSESSMENT — PAIN DESCRIPTION - PROGRESSION
CLINICAL_PROGRESSION: GRADUALLY WORSENING

## 2020-07-09 ASSESSMENT — PAIN DESCRIPTION - DESCRIPTORS
DESCRIPTORS: ACHING
DESCRIPTORS: ACHING
DESCRIPTORS: SORE

## 2020-07-09 NOTE — CONSULTS
daughter, ELMER ([phone]), x2. No answer either time, left multiple voicemails. Spoke to patient's friend, Wimler ([phone]). Wilmer reports that her and the patient are very close and they talk to and/or see regularly. She reports that the patient has been feeling a bit down recently due to the loss of her brothers but notes that outside of that, she has been doing well. She denies that the patient has made any suicidal statements to her and reports no known history of suicide attempts. She denies any concerns with safety should the patient be discharged. Duration: 5 days   Severity: Severe  Context: Stress, medical issues  Associated symptoms: As above    Past Psychiatric History:    Previous Diagnoses: Denies   Previous Hosp: Denies   Outpatient Tx: Reports that she has seen a therapist in her PCP office once, called to schedule another appointment but hasn't been seen yet  Med Trials: Lexapro, Paxil, Trazodone, Gabapentin, Valium  Suicidality: Denies recent or current suicidal ideations, denies past history of suicide attempts   History of Violence: Denies     Past Medical History:  Past Medical History:   Diagnosis Date    COPD (chronic obstructive pulmonary disease) ([facility] [address]     DDD (degenerative disc disease)     Fibromyalgia     Sciatica      Home Medications:  Prior to Admission medications    Medication Sig Start Date End Date Taking? Authorizing Provider   PARoxetine (PAXIL) 40 MG tablet Take 40 mg by mouth every morning   Yes Historical Provider, MD   oxyCODONE-acetaminophen (PERCOCET) 7.5-325 MG per tablet Take 1 tablet by mouth every 6 hours as needed for Pain. Yes Historical Provider, MD   traZODone (DESYREL) 100 MG tablet Take 100 mg by mouth nightly   Yes Historical Provider, MD   gabapentin (NEURONTIN) 300 MG capsule Take 300 mg by mouth 2 times daily. .   Yes Historical Provider, MD   albuterol sulfate  (90 Base) MCG/ACT inhaler Use 2 puffs 4 times daily for 7 days then as needed for wheezing. Dispense with Spacer and instruct in use. At patient's preference may use 60 dose MDI. May Sub Pro-Air or Proventil as needed per insurance. 9/1/18  Yes Ej Azul,    zafirlukast (ACCOLATE) 20 MG tablet Take 20 mg by mouth 2 times daily. Yes Historical Provider, MD   esomeprazole Magnesium (NEXIUM) 40 MG PACK Take 40 mg by mouth daily. Yes Historical Provider, MD   baclofen (LIORESAL) 10 MG tablet Take 10 mg by mouth 2 times daily    Yes Historical Provider, MD   atorvastatin (LIPITOR) 40 MG tablet Take 20 mg by mouth daily    Yes Historical Provider, MD   fluticasone-salmeterol (ADVAIR) 250-50 MCG/DOSE AEPB Inhale 1 puff into the lungs every 12 hours. Yes Historical Provider, MD   promethazine (PHENERGAN) 25 MG tablet Take 25 mg by mouth every 6 hours as needed for Nausea.    Yes Historical Provider, MD     Chemical Dependency History:   Tobacco: Attempting to quit, occasionally uses vape pen  Alcohol: Denies   Illicit: History of MJ use, denies recent or current use    Family Hx:    Mother (dementia)     Social Hx:   Marital Status:   Children: 3 children  Support System: Sister in law, daughter, friends  Housing: Lives with mother, son occasionally stays with them  Vocational: SSDI  Trauma: None known  Legal: None known    Current Medications Ordered:   ampicillin-sulbactam  1.5 g Intravenous Q6H    PARoxetine  40 mg Oral QAM    traZODone  100 mg Oral Nightly    sodium chloride flush  10 mL Intravenous 2 times per day    sennosides-docusate sodium  1 tablet Oral BID    atorvastatin  40 mg Oral Daily    escitalopram  10 mg Oral Daily    pantoprazole  40 mg Oral Daily    budesonide-formoterol  2 puff Inhalation BID    gabapentin  300 mg Oral BID    enoxaparin  40 mg Subcutaneous Daily    albuterol sulfate HFA  2 puff Inhalation TID      PRN Meds: ipratropium-albuterol, oxyCODONE-acetaminophen **OR** oxyCODONE-acetaminophen, morphine, sodium chloride flush, magnesium hydroxide, acetaminophen **OR** acetaminophen, polyethylene glycol, promethazine **OR** ondansetron, albuterol sulfate HFA     ROS: See Medical H&PE    PE:    /66   Pulse 85   Temp 98.3 °F (36.8 °C) (Oral)   Resp 18   Ht 5' 5\" (1.651 m)   Wt 137 lb (62.1 kg)   SpO2 94%   BMI 22.80 kg/m²       Motor / Gait: Seated in chair, no abnormal or involuntary movements noted  AIMS: 0    Mental Status Examination:    Appearance: WF, appears stated age, seated in chair, wearing hospital attire, fair grooming and fair hygiene    Behavior/Attitude Toward Examiner: Cooperative, attentive and good eye contact  Speech: Spontaneous, normal rate, normal volume and well articulated   Mood: \"Fine\"  Affect: Mood congruent   Thought Processes: Logical, linear  Thought Content: Denies SI, denies HI, no delusions voiced, no obsessions  Perceptions: Denies AVH, did not appear to be RTIS  Attention: Intact  Abstraction: Intact  Cognition: Average fund of knowledge, alert and oriented to person, place, time, and situation, immediate, recent, and remote recall intact  Insight: Fair insight   Judgment: Fair judgment      LAB: Reviewed all labs obtained during admission to date. Dx:   Primary Psychiatric (DSM V) Diagnosis: Depression, unspecified  Secondary Psychiatric (DSM V) Diagnoses: Anxiety, per history  Chemical Dependency Diagnoses: None     Recommendations:    1. Patient does not need inpatient psychiatric admission at this time. She endorses some depression related to home stressors and current situation, however, she denies being recently or currently suicidal. She does not have a history of suicide attempts. She feels safe discharging home and collateral information obtained from friend supports this. 2. She reports that she has seen a therapist one time through her PCP office and is open to scheduling another appointment with them.  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 3. Feels that her current medication of Paxil is helpful. Will continue. 4. May benefit from home health or other community based resources related to her current situation and as she is the primary caregiver for her mother who has dementia. Spent >80 minutes face to face with patient of which >50% was spent counseling and providing education regarding diagnosis, treatment options, and prognosis. Thank you for consult. Please do not hesitate to contact provider if there are additional questions regarding patient.     Aloysius Saint, MPH, APRN, PMHNP-BC  07/09/20

## 2020-07-09 NOTE — PROGRESS NOTES
Occupational Therapy Daily Treatment Note    Unit: 2 [place]  Date:  7/9/2020  Patient Name:    Nirali Ruff  Admitting diagnosis:  Closed bicondylar fracture of distal femur, right, initial encounter [facility]) [M24.003S, [address]  Admit Date:  7/5/2020  Precautions/Restrictions:    Fall risk, Bed/chair alarm and Lines -Supplemental O2 (2) and  NWB on the Rt LE   per Ortho - Physical therapy as per protocol -nonweightbearing right lower extremity, keep knee immobilizer in place while ambulating.  When resting in bed may remove knee immobilizer and work on very gentle range of motion 0 to 30 degrees.     RIGHT FEMUR OPEN REDUCTION INTERNAL FIXATION 7-6-20      Pt with prior TKR on the right and left side. Discharge Recommendations: SNF  DME needs for discharge: defer to facility       Therapy recommendations for staff:   Assist of 2 with use of rolling walker (RW) for all transfers to/from [facility]  to/from chair    AM-PAC Score: AM-PAC Inpatient Daily Activity Raw Score: 17  Home Health S4 Level: NA       Treatment Time:  1722-6047  Treatment number:  3  Timed code treatment minutes: 45 minutes  Total treatment minutes:  45  minutes    History of Present Illness:   Per H&P Vikas JIMENES   62 y. o. female the patient was standing up from cleaning desk when she got her feet twisted and fell.  She states her knee twisted in an awkward angle she felt immediate pain.  She states that she has had severe pain ever since this happened and is unable to bear weight.    Subjective: Pt in the bed and willing to work with OT. The pt more receptive to considering Rehab    Pain   Yes  Rating: moderate  Location:Rt LE  Pain Medicine Status: nurse aware and gave pt pain meds prior to therapy     Bed Mobility:   Supine to Sit:  CGA with VC   Sit to Supine:  Not Tested  Rolling:           Not Tested  Scooting:        SBA    Transfer Training:   Sit to stand:   CGA of one to CGA of second person to maintain NWB .  Pt not maitaining NWB as well today with taking a few more steps   Stand to sit:  Min A  with VC on hand placement  Bed to Chair:  Min A  and RW  Bed to [facility]:   NT  Standard toilet:   Not Tested    Activity Tolerance increased anxiety and pain with movement    Pt completed therapy session with SOB noted with movement   Sp02 89% on 2 liters after transfer then increased to 93%  after transfer to chair     ADL Training:   Upper body dressing: Not Tested- Pt  declined   Upper body bathing:  Not Tested- pt declined  Lower body dressing:  Min A   Lower body bathing:  Not Tested  Toileting:   Not Tested  Grooming/Hygiene: Ind brushing hair    Therapeutic Exercise:     UE push ups in the chair 10x   Red theraband   Shoulder horizontal abduction 10x  Elbow flex/ext 10x     Patient Education:   Role of OT  Recommendations for DC  Use of AE    Positioning Needs:   Pt reclined in chair, alarm set, positioned in proper neutral alignment and pressure relief provided. Family Present:  No    Assessment:   The pt was CGA with VC for supine to sit. The pt was mIn assist of two for sit to stand to the RW and to maintain NWB on the Rt LE. The pt was min assist for transfer  to chair. The pt was fatigued and SOB with transfer to chair. Pt completed UE exercises with supervision. The pt was 89 % on with 2 liters on with transfer and increased to 93%  The pt continues to require rehab. GOALS  To be met in 3 Visits:  1). Bed to toilet/BSC: CGA and RW     To be met in 5 Visits:  1). Supine to/from Sit:             CGA- goal met -7-8-20 new goal supervision  2). Upper Body Bathing:         Modified Independent  3). Lower Body Bathing: Mod A   4). Upper Body Dressing:       Modified Independent  5). Lower Body Dressing: Mod A   6).  Pt to demonstrate UE exs x 15 reps with minimal cues      Plan: cont with POC  Elvie Claw OTR/L [id number]      If patient discharges from this facility prior to next visit, this note will serve as the Discharge Summary

## 2020-07-09 NOTE — PROGRESS NOTES
Admit: 2020    Name:  Zeenat Crocker  Room:  [facility]40214-01  MRN:    [id number]     Daily Progress Note for 2020     Interval History:     Underwent ORIF Right intraarticular comminuted distal femoral fracture     Says pain not under control    Continues to be on 2 L of O2    Scheduled Meds:   PARoxetine  40 mg Oral QAM    traZODone  100 mg Oral Nightly    sodium chloride flush  10 mL Intravenous 2 times per day    sennosides-docusate sodium  1 tablet Oral BID    atorvastatin  40 mg Oral Daily    escitalopram  10 mg Oral Daily    pantoprazole  40 mg Oral Daily    budesonide-formoterol  2 puff Inhalation BID    gabapentin  300 mg Oral BID    enoxaparin  40 mg Subcutaneous Daily    albuterol sulfate HFA  2 puff Inhalation TID       Continuous Infusions:      PRN Meds:  ipratropium-albuterol, oxyCODONE-acetaminophen **OR** oxyCODONE-acetaminophen, morphine, sodium chloride flush, magnesium hydroxide, acetaminophen **OR** acetaminophen, polyethylene glycol, promethazine **OR** ondansetron, albuterol sulfate HFA                  Objective:     Temp  Av.4 °F (36.9 °C)  Min: 97.7 °F (36.5 °C)  Max: 99.4 °F (37.4 °C)  Pulse  Av.5  Min: 71  Max: 103  BP  Min: 105/66  Max: 128/76  SpO2  Av.1 %  Min: 90 %  Max: 98 %  Patient Vitals for the past 4 hrs:   BP Temp Temp src Pulse Resp SpO2   20 0730 105/66 98.3 °F (36.8 °C) Oral 85 18 94 %   20 0722 -- -- -- -- -- 95 %   20 0405 111/62 98 °F (36.7 °C) Oral 71 15 98 %         Intake/Output Summary (Last 24 hours) at 2020 0735  Last data filed at 2020 0102  Gross per 24 hour   Intake 960 ml   Output 3100 ml   Net -2140 ml       Physical Exam:  General:  Awake, alert and oriented.  Appears to be not in any distress  Mucous Membranes:  Pink , anicteric  Neck: No JVD, no carotid bruit, no thyromegaly  Chest:  Clear to auscultation bilaterally, no added sounds  Cardiovascular:  RRR S1S2 heard, no murmurs or gallops  Abdomen: Soft, undistended, non tender, no organomegaly, BS present  Extremities: right LE ace wrap,right knee immobilizer  Neurological : oriented x3     Lab Data:  CBC:   Recent Labs     07/07/20  0606 07/08/20  0529 07/09/20  0526   WBC 6.6 5.8 5.6   RBC 2.70* 2.56* 2.49*   HGB 8.0* 7.6* 7.3*   HCT 24.6* 23.3* 22.4*   MCV 91.0 90.9 90.1   RDW 13.7 13.6 13.9    155 189     BMP:   Recent Labs     07/07/20  0606 07/08/20  0529   * 142   K 3.9 3.6    106   CO2 25 27   BUN 7 6*   CREATININE 0.6 <0.5*     BNP: No results for input(s): BNP in the last 72 hours. PT/INR: No results for input(s): PROTIME, INR in the last 72 hours. APTT:No results for input(s): APTT in the last 72 hours. CARDIAC ENZYMES: No results for input(s): CKMB, CKMBINDEX, TROPONINI in the last 72 hours. Invalid input(s): CKTOTAL;3  FASTING LIPID PANEL:  Lab Results   Component Value Date    CHOL 152 08/21/2018    HDL 81 08/21/2018    TRIG 93 08/21/2018     LIVER PROFILE:   No results for input(s): AST, ALT, ALB, BILIDIR, BILITOT, ALKPHOS in the last 72 hours.       CT HEAD WO CONTRAST   Final Result   No acute intracranial abnormality.           XR KNEE RIGHT (3 VIEWS)   Final Result   Displaced, comminuted periprosthetic fracture of the distal femoral   metaphysis at the proximal margin of the femoral component of the right knee   prosthesis.          Assessment & Plan:     Patient Active Problem List    Diagnosis Date Noted    Closed fracture of right distal femur ([address]     Chronic obstructive pulmonary disease (HCC)     Narcotic dependence ([address]     Closed bicondylar fracture of distal femur, right, initial encounter ([facility] 07/05/2020     1) Right femur periprosthetic fracture  - Ortho consulted  - prn IV morphine- dose increased due to severe pain   s/p ORIF Right intraarticular comminuted distal femoral fracture 7/6     2) COPD   n oexacerbation  - Continue patient's inhalers  - stable  Add duonebs     3) GERD  - continue PPI    4.chronic pain with narcotic dependence    HLD. Continue lipitor    ABLA  Will follow. continues to drop  Transfuse if less than 7. Acute hypoxic resp failure   H/o COPD   IS   CXR - infiltrate- atelectasis versus pna  Duonebs  Start unasyn for possible aspiration pna    She apparently had expressed some suicidal thoughts when she was in the emergency room. Would plan a psychiatric consult.   The rehab facility would not accept her without that.        DVT Prophylaxis: lovenox  Diet: general.  Code Status: Full Code        Dispo - inpt    Ot/pt   Will need SNF   Patient is refusing          Sharon Atkins

## 2020-07-09 NOTE — PROGRESS NOTES
D:Bedside report received from [facility], all current treatments, skin issues, and plan of care were reviewed by both RN's, care transferred.

## 2020-07-09 NOTE — PROGRESS NOTES
Pt resting in bed when md came into the room and was talking to the pt about her plan of care and that psych will be seeing her. Pt became very upset that psych is seeing her stated that she is not gong to harm herself. Pt became very upset with the fact that she felt that the doctor believed her \"dope head\" son over her. As she is sitting here in front of him. Pt stated that she has a lot going on at home and she is not able to go stay anyplace else. 0800 pt called 911. Pt stated that she is worried that her son is going to take her care and wreck it and that she cant leave her [de-identified] mother home with him and she want her son and his girlfriend out of the house. Spoke with pt about talking with the  to go over what her options are and if she would like to see the police and file a report that can be done. Reported concerns to  upon her arrival and she will speak with the patient.

## 2020-07-09 NOTE — PROGRESS NOTES
RESPIRATORY THERAPY ASSESSMENT    Name:  [facility] Record Number:  [id number]  Age: 62 y.o. Gender: female  : 1955  Today's Date:  2020  Room:  [facility]    Assessment     Is the patient being admitted for a COPD or Asthma exacerbation? No   (If yes the patient will be seen every 4 hours for the first 24 hours and then reassessed)    Patient Admission Diagnosis      Allergies  No Known Allergies    Minimum Predicted Vital Capacity:               Actual Vital Capacity:                    Pulmonary History:COPD  Home Oxygen Therapy:  room air  Home Respiratory Therapy:Albuterol and Salmeterol/Fluticasone Propionate   Current Respiratory Therapy:  Albuterol 2PUFFS Q4PRN & TID,  Duoneb Q6PRN,  Symbicort BID,  Treatment Type: MDI  Medications: Budesonide/Formoterol    Respiratory Severity Index(RSI)   Patients with orders for inhalation medications, oxygen, or any therapeutic treatment modality will be placed on Respiratory Protocol. They will be assessed with the first treatment and at least every 72 hours thereafter. The following severity scale will be used to determine frequency of treatment intervention.     Smoking History: Pulmonary Disease or Smoking History, Greater than 15 pack year = 2    Social History  Social History     Tobacco Use    Smoking status: Current Every Day Smoker     Packs/day: 0.50     Types: Cigarettes    Smokeless tobacco: Never Used   Substance Use Topics    Alcohol use: No    Drug use: No       Recent Surgical History: None = 0  Past Surgical History  Past Surgical History:   Procedure Laterality Date    CHOLECYSTECTOMY      HAND SURGERY      HYSTERECTOMY      KNEE SURGERY      OTHER SURGICAL HISTORY      RIGHT FEMUR OPEN REDUCTION INTERNAL FIXATION (Right    TONSILLECTOMY         Level of Consciousness: Alert, Oriented, and Cooperative = 0    Level of Activity: Walking with assistance = 1    Respiratory Pattern: Regular Pattern; RR 8-20 = Nebulizer ORESTES [facility] to patients when ANY of the following criteria are met  a. Incognizant or uncooperative          b. Patients treated with HHN at Home        c. Unable to demonstrate proper use of MDI with spacer     d. RR > 30 bpm   5. Bronchodilators will be delivered via Metered Dose Inhaler (MDI), HHN, Aerogen to intubated patients on mechanical ventilation. 6. Inhalation medication orders will be delivered and/or substituted as outlined below. Aerosolized Medications Ordering and Administration Guidelines:    1. All Medications will be ordered by a physician, and their frequency and/or modality will be adjusted as defined by the patients Respiratory Severity Index (RSI) score. 2. If the patient does not have documented COPD, consider discontinuing anticholinergics when RSI is less than 9.  3. If the bronchospasm worsens (increased RSI), then the bronchodilator frequency can be increased to a maximum of every 4 hours. If greater than every 4 hours is required, the physician will be contacted. 4. If the bronchospasm improves, the frequency of the bronchodilator can be decreased, based on the patient's RSI, but not less than home treatment regimen frequency. 5. Bronchodilator(s) will be discontinued if patient has a RSI less than 9 and has received no scheduled or as needed treatment for 72  Hrs. Patients Ordered on a Mucolytic Agent:    1. Must always be administered with a bronchodilator. 2. Discontinue if patient experiences worsened bronchospasm, or secretions have lessened to the point that the patient is able to clear them with a cough. Anti-inflammatory and Combination Medications:    1. If the patient lacks prior history of lung disease, is not using inhaled anti-inflammatory medication at home, and lacks wheezing by examination or by history for at least 24 hours, contact physician for possible discontinuation.

## 2020-07-09 NOTE — PROGRESS NOTES
Charge nurse attempted to restart iv x3 without success. Charge nurse called clinical to attempt access.

## 2020-07-09 NOTE — DISCHARGE INSTR - COC
Continuity of Care Form    Patient Name: Bari Calvo   :  1955  MRN:  [id number]    Admit date:  2020  Discharge date:  2020    Code Status Order: Full Code   Advance Directives:   [facility] Documentation     Date/Time Healthcare Directive Type of Healthcare Directive Copy in [facility] [address] Agent's Name Healthcare Agent's Phone Number    20 1227  No, patient does not have an advance directive for healthcare treatment -- -- -- -- --    20 1854  No, patient does not have an advance directive for healthcare treatment -- -- -- -- --          Admitting Physician:  Bessie Tesfaye MD  PCP: Marcus Pyle    Discharging Nurse: Ketty Amanda Unit/Room#: 0214/0214-01  Discharging Unit Phone Number: [phone]    Emergency Contact:   Extended Emergency Contact Information  Primary Emergency Contact: Xavi Rosario [address] Phone: [phone]  Mobile Phone: [phone]  Relation: Parent  Secondary Emergency Contact: [address] Phone: [phone]  Relation: Child    Past Surgical History:  Past Surgical History:   Procedure Laterality Date    CHOLECYSTECTOMY      FEMUR FRACTURE SURGERY Right 2020    RIGHT FEMUR OPEN REDUCTION INTERNAL FIXATION performed by Papo Arriola DO at [address] [facility] (Right    TONSILLECTOMY         Immunization History: There is no immunization history on file for this patient.     Active Problems:  Patient Active Problem List   Diagnosis Code    Closed bicondylar fracture of distal femur, right, initial encounter ([address] S72.421A, S72.431A    Closed fracture of right distal femur ([address] S72.401A    Chronic obstructive pulmonary disease ([address] J44.9    Narcotic dependence ([address] F11.20 Isolation/Infection:   Isolation          No Isolation        Patient Infection Status     Infection Onset Added Last Indicated Last Indicated By Review Planned Expiration Resolved Resolved By    None active    Resolved    COVID-19 Rule Out 07/09/20 07/09/20 07/09/20 COVID-19 (Ordered)   07/09/20 Rule-Out Test Resulted          Nurse Assessment:  Last Vital Signs: /62   Pulse 80   Temp 98 °F (36.7 °C) (Oral)   Resp 20   Ht 5' 5\" (1.651 m)   Wt 137 lb (62.1 kg)   SpO2 96%   BMI 22.80 kg/m²     Last documented pain score (0-10 scale): Pain Level: 7  Last Weight:   Wt Readings from Last 1 Encounters:   07/06/20 137 lb (62.1 kg)     Mental Status:  oriented and alert    IV Access:  - None    Nursing Mobility/ADLs:  Walking   Assisted  Transfer  Assisted  Bathing  Assisted  Dressing  Assisted  Toileting  Assisted  Feeding  Independent  Med Admin  Independent  Med Delivery   whole    Wound Care Documentation and Therapy:        Elimination:  Continence:   · Bowel: Yes  · Bladder: Yes  Urinary Catheter: None   Colostomy/Ileostomy/Ileal Conduit: No       Date of Last BM:     Intake/Output Summary (Last 24 hours) at 7/9/2020 1448  Last data filed at 7/9/2020 1210  Gross per 24 hour   Intake 960 ml   Output 2000 ml   Net -1040 ml     I/O last 3 completed shifts: In: 12 [P.O.:960]  Out: 3100 [Urine:3100]    Safety Concerns: At Risk for Falls    Impairments/Disabilities:      fracture of R distal femur    Nutrition Therapy:  Current Nutrition Therapy:   - Oral Diet:  General    Routes of Feeding: Oral  Liquids: Thin Liquids  Daily Fluid Restriction: no  Last Modified Barium Swallow with Video (Video Swallowing Test): not done    Treatments at the Time of Hospital Discharge:   Respiratory Treatments:   Oxygen Therapy:  is not on home oxygen therapy.   Ventilator:    - No ventilator support    Rehab Therapies: Physical Therapy and Occupational Therapy  Weight Bearing Status/Restrictions: Non-weight bearing on right leg  Other Medical Equipment (for information only, NOT a DME order):  walker  Other Treatments:     Patient's personal belongings (please select all that are sent with patient):  all of patients belongings were collected and are with patient. RN SIGNATURE:  Electronically signed by Gaviota Fatima RN on 7/10/20 at 10:35 AM EDT    CASE MANAGEMENT/SOCIAL WORK SECTION    Inpatient Status Date: 07/05/2020    Readmission Risk Assessment Score:  Readmission Risk              Risk of Unplanned Readmission:        19           Discharging to Facility/ Agency   Name: [facility]  Address: [address] [place] [address]  Phone: [phone]  Fax: [phone]  ·       / signature: Electronically signed: Bryan Hernandez RN Case Manager 9:27 EDT  PHYSICIAN SECTION    Prognosis: Good    Condition at Discharge: Stable    Rehab Potential (if transferring to Rehab): Fair    Recommended Labs or Other Treatments After Discharge: h/H in 1 week     Physician Certification: I certify the above information and transfer of Gael Palomino  is necessary for the continuing treatment of the diagnosis listed and that she requires [facility] for less 30 days.      Update Admission H&P: No change in H&P    PHYSICIAN SIGNATURE:  Electronically signed by Gabriella Sanders MD on 7/10/20 at 8:59 AM EDT

## 2020-07-09 NOTE — PROGRESS NOTES
Inpatient Physical Therapy Daily Treatment Note    Unit: [address]  Date:  7/9/2020  Patient Name:    Yannick Rios  Admitting diagnosis:  Closed bicondylar fracture of distal femur, right, initial encounter [facility]) [A85.242Q, [address]  [facility] [address]  Admit Date:  7/5/2020  Precautions/Restrictions:  Fall risk, Bed/chair alarm, Lines -Supplemental O2 (2L), WB Restrictions (NWB) and Knee immobilizer     Physical therapy as per protocol -nonweightbearing right lower extremity, keep knee immobilizer in place while ambulating.  When resting in bed may remove knee immobilizer and work on very gentle range of motion 0 to 30 degrees. Discharge Recommendations: SNF  DME needs for discharge: defer to facility       Therapy recommendation for EMS Transport: can transport by wheelchair with elevating leg rest    Therapy recommendations for staff:   Assist of 2 with use of rolling walker (RW), knee immobilizer and gait belt for all transfers and ambulation to/from [facility]  to/from [facility]    History of Present Illness: 61 yo female admitted to [facility] on 7/5/20 after fall at home. X-ray showed bicondylar fx of distal femur. Pt with prior R TKA. Pt underwent R femur ORIF on 7/6/20.      Physical therapy as per protocol -nonweightbearing right lower extremity, keep knee immobilizer in place while ambulating.  When resting in bed may remove knee immobilizer and work on very gentle range of motion 0 to 30 degrees. Home Health S4 Level Recommendation: NA  AM-PAC Mobility Score   AM-PAC Inpatient Mobility Raw Score : 16       Treatment Time:  10:10-10:55  Treatment number: 3  Timed Code Treatment Minutes: 45 minutes  Total Treatment Minutes:  45  minutes    Cognition    A&O orientation not directly assessed. Able to follow 2 step commands    Subjective  Patient lying supine in bed with no family present   Pt agreeable to this PT tx.      Pain   Yes  Location: R LE  Rating:    severe/10  Pain Medicine Status: Pain med requested and Received pain med prior to tx     Bed Mobility   Supine to Sit:    CGA at R LE (pt needed therapist's hand on her leg for reassurance, therapist did not really provide assist), cue to hook L ankle under R to support LE  Sit to Supine:   Not Tested  Rolling:   Not Tested  Scooting:   SBA-Min A to support R LE at times, pt reported fatigue with using L LE to support R when scooting in chair    Transfer Training     Sit to stand:   CGA with increased time to move hand from bed to walker  Stand to sit:   CGA  Bed to Chair:   CGA with use of gait belt, rolling walker (RW) and knee immobilizer on right    Gait Training gait completed as indicated below  Distance:      7 ft  Deviations (firm surface/linoleum):  decreased lizbeth, step to pattern and decreased step length on left  Assistive Device Used:    gait belt, rolling walker (RW) and knee immobilizer on right  Level of Assist:    CGA  Comment: 2nd therapist monitored R LE WB - pt reported that she had no weight on foot but intermittently required assistance with advancing the R LE and was observed placing foot on group for step-to pattern vs hop-to pattern (worse at maintaining NWB today)    Stair Training deferred, pt unsafe/not appropriate to complete stairs at this time    Therapeutic Exercise all completed bilaterally unless indicated  Ankle Pumps x 10 reps  Glut sets x 10 reps  Quad sets x 10 reps (cues and encouragement for 5 sec hold)  Heel slides x 10 reps (in sitting with therapist supporting LE at ankle and knee ~5-20 deg flexion L LE)  Hip abduction: x 10 reps (abduction sets on R LE)  Hip adduction/pillow squeeze: x 10 reps     Balance  Sitting:  Good ; Modified Independent  Comments:     Standing: Fair +; CGA  Comments: with B UE support on RW, intermittently putting weight through R LE though pt denies that she is doing this    Patient Education      Role of PT, POC, Discharge recommendations, DC recommendations, safety awareness, transfer techniques, pacing activity, HEP and calling for assist with mobility. Positioning Needs       Pt reclined in chair, alarm set, positioned in proper neutral alignment and pressure relief provided. Call light provided and all needs within reach  Ice provided and instructed in proper off/on schedule      Activity Tolerance   Pt completed therapy session with Pain noted with all mobility  SOB noted with ambulation. SpO2: 91% on 2L at rest   89-91% after ambulation on 2L  HR: 120 bpm after ambulation    Other  None. Assessment :  Patient required increased cues for maintaining R LE NWB today, possibly due to increased fatigue from several transfers to [facility] earlier this date. Pt is highly motivated to participate but requires frequent encouragement and rest breaks during session due to increased pain with all mobility (pt follows with pain clinic). Pt would benefit from further balance training and hip strengthening to improve transfers and ambulation. Recommending SNF upon discharge as patient functioning well below baseline, demonstrates good rehab potential and unable to return home due to limited safety awareness and pt has unreilable assist at home (pt usually assisted older mother with dementia, and unsure of son's reliability). Goals (all goals ongoing unless otherwise indicated)  To be met in 3 visits:  1). Independent with LE Ex x 10 reps     To be met in 6 visits:  1).  Supine to/from sit: Independent  2).  Sit to/from stand: CGA with maintenance of R LE NWB  3).  Bed to chair: CGA with maintenance of R LE NWB  4).  Gait: Ambulate 25 ft.   with  CGA and use of rolling walker (RW) with maintenance of R LE NWB  5).  Tolerate B LE exercises 3 sets of 10-15 reps  6). Stand 5 minutes at RW with B UE support and no more than CGA with indep maintenance of R LE NWB     Plan   Continue with plan of care.     Signature: Leatha Zhu, PT, DPT #[id number]    If patient discharges from this facility prior to next visit, this note will serve as the Discharge Summary.

## 2020-07-09 NOTE — PROGRESS NOTES
A: Assessment completed and documented, call light is in reach, discussed plan of care with patient who agreed, bed exit alarm is on for added safety, patient ambulated to [facility] and back to bed well with standby assist of one.

## 2020-07-10 VITALS
RESPIRATION RATE: 14 BRPM | OXYGEN SATURATION: 92 % | HEART RATE: 80 BPM | DIASTOLIC BLOOD PRESSURE: 62 MMHG | HEIGHT: 65 IN | BODY MASS INDEX: 22.82 KG/M2 | TEMPERATURE: 98.5 F | SYSTOLIC BLOOD PRESSURE: 102 MMHG | WEIGHT: 137 LBS

## 2020-07-10 LAB
ANION GAP SERPL CALCULATED.3IONS-SCNC: 10 MMOL/L (ref 3–16)
BUN BLDV-MCNC: 9 MG/DL (ref 7–20)
CALCIUM SERPL-MCNC: 8.2 MG/DL (ref 8.3–10.6)
CHLORIDE BLD-SCNC: 103 MMOL/L (ref 99–110)
CO2: 28 MMOL/L (ref 21–32)
CREAT SERPL-MCNC: <0.5 MG/DL (ref 0.6–1.2)
GFR AFRICAN AMERICAN: >60
GFR NON-AFRICAN AMERICAN: >60
GLUCOSE BLD-MCNC: 116 MG/DL (ref 70–99)
HCT VFR BLD CALC: 22.2 % (ref 36–48)
HEMOGLOBIN: 7.2 G/DL (ref 12–16)
MCH RBC QN AUTO: 29.6 PG (ref 26–34)
MCHC RBC AUTO-ENTMCNC: 32.5 G/DL (ref 31–36)
MCV RBC AUTO: 90.9 FL (ref 80–100)
PDW BLD-RTO: 13.9 % (ref 12.4–15.4)
PLATELET # BLD: 238 K/UL (ref 135–450)
PMV BLD AUTO: 7.1 FL (ref 5–10.5)
POTASSIUM SERPL-SCNC: 4.2 MMOL/L (ref 3.5–5.1)
RBC # BLD: 2.44 M/UL (ref 4–5.2)
SODIUM BLD-SCNC: 141 MMOL/L (ref 136–145)
WBC # BLD: 6.2 K/UL (ref 4–11)

## 2020-07-10 PROCEDURE — 6360000002 HC RX W HCPCS: Performed by: INTERNAL MEDICINE

## 2020-07-10 PROCEDURE — 6370000000 HC RX 637 (ALT 250 FOR IP): Performed by: ORTHOPAEDIC SURGERY

## 2020-07-10 PROCEDURE — 85027 COMPLETE CBC AUTOMATED: CPT

## 2020-07-10 PROCEDURE — 2580000003 HC RX 258: Performed by: ORTHOPAEDIC SURGERY

## 2020-07-10 PROCEDURE — 94640 AIRWAY INHALATION TREATMENT: CPT

## 2020-07-10 PROCEDURE — 97110 THERAPEUTIC EXERCISES: CPT

## 2020-07-10 PROCEDURE — 80048 BASIC METABOLIC PNL TOTAL CA: CPT

## 2020-07-10 PROCEDURE — 94761 N-INVAS EAR/PLS OXIMETRY MLT: CPT

## 2020-07-10 PROCEDURE — 6370000000 HC RX 637 (ALT 250 FOR IP): Performed by: NURSE PRACTITIONER

## 2020-07-10 PROCEDURE — 6360000002 HC RX W HCPCS: Performed by: ORTHOPAEDIC SURGERY

## 2020-07-10 PROCEDURE — 2580000003 HC RX 258: Performed by: INTERNAL MEDICINE

## 2020-07-10 PROCEDURE — 97116 GAIT TRAINING THERAPY: CPT

## 2020-07-10 PROCEDURE — 2700000000 HC OXYGEN THERAPY PER DAY

## 2020-07-10 PROCEDURE — 99239 HOSP IP/OBS DSCHRG MGMT >30: CPT | Performed by: INTERNAL MEDICINE

## 2020-07-10 PROCEDURE — 36415 COLL VENOUS BLD VENIPUNCTURE: CPT

## 2020-07-10 RX ORDER — OXYCODONE AND ACETAMINOPHEN 7.5; 325 MG/1; MG/1
1 TABLET ORAL EVERY 6 HOURS PRN
Qty: 8 TABLET | Refills: 0 | Status: SHIPPED | OUTPATIENT
Start: 2020-07-10 | End: 2020-07-12

## 2020-07-10 RX ORDER — AMOXICILLIN AND CLAVULANATE POTASSIUM 500; 125 MG/1; MG/1
1 TABLET, FILM COATED ORAL 3 TIMES DAILY
Qty: 12 TABLET | Refills: 0
Start: 2020-07-10 | End: 2020-07-14

## 2020-07-10 RX ORDER — FERROUS SULFATE 325(65) MG
325 TABLET ORAL 2 TIMES DAILY
Qty: 30 TABLET | Refills: 0 | Status: ON HOLD
Start: 2020-07-10 | End: 2022-02-27

## 2020-07-10 RX ADMIN — AMPICILLIN SODIUM AND SULBACTAM SODIUM 1.5 G: 1; .5 INJECTION, POWDER, FOR SOLUTION INTRAMUSCULAR; INTRAVENOUS at 02:08

## 2020-07-10 RX ADMIN — Medication 2 PUFF: at 07:53

## 2020-07-10 RX ADMIN — ATORVASTATIN CALCIUM 40 MG: 40 TABLET, FILM COATED ORAL at 08:30

## 2020-07-10 RX ADMIN — ESCITALOPRAM OXALATE 10 MG: 10 TABLET ORAL at 08:30

## 2020-07-10 RX ADMIN — PAROXETINE HYDROCHLORIDE 40 MG: 20 TABLET, FILM COATED ORAL at 08:30

## 2020-07-10 RX ADMIN — SENNOSIDES AND DOCUSATE SODIUM 1 TABLET: 8.6; 5 TABLET ORAL at 08:31

## 2020-07-10 RX ADMIN — PANTOPRAZOLE SODIUM 40 MG: 40 TABLET, DELAYED RELEASE ORAL at 08:31

## 2020-07-10 RX ADMIN — Medication 10 ML: at 08:31

## 2020-07-10 RX ADMIN — SODIUM CHLORIDE 200 MG: 9 INJECTION, SOLUTION INTRAVENOUS at 09:00

## 2020-07-10 RX ADMIN — OXYCODONE HYDROCHLORIDE AND ACETAMINOPHEN 2 TABLET: 5; 325 TABLET ORAL at 02:08

## 2020-07-10 RX ADMIN — GABAPENTIN 300 MG: 300 CAPSULE ORAL at 08:31

## 2020-07-10 RX ADMIN — OXYCODONE HYDROCHLORIDE AND ACETAMINOPHEN 2 TABLET: 5; 325 TABLET ORAL at 08:31

## 2020-07-10 RX ADMIN — AMPICILLIN SODIUM AND SULBACTAM SODIUM 1.5 G: 1; .5 INJECTION, POWDER, FOR SOLUTION INTRAMUSCULAR; INTRAVENOUS at 08:30

## 2020-07-10 RX ADMIN — ENOXAPARIN SODIUM 40 MG: 40 INJECTION SUBCUTANEOUS at 08:30

## 2020-07-10 ASSESSMENT — PAIN - FUNCTIONAL ASSESSMENT
PAIN_FUNCTIONAL_ASSESSMENT: PREVENTS OR INTERFERES WITH ALL ACTIVE AND SOME PASSIVE ACTIVITIES
PAIN_FUNCTIONAL_ASSESSMENT: PREVENTS OR INTERFERES SOME ACTIVE ACTIVITIES AND ADLS

## 2020-07-10 ASSESSMENT — PAIN DESCRIPTION - ONSET
ONSET: GRADUAL
ONSET: GRADUAL

## 2020-07-10 ASSESSMENT — PAIN SCALES - GENERAL
PAINLEVEL_OUTOF10: 0
PAINLEVEL_OUTOF10: 6
PAINLEVEL_OUTOF10: 8

## 2020-07-10 ASSESSMENT — PAIN DESCRIPTION - ORIENTATION
ORIENTATION: RIGHT
ORIENTATION: RIGHT

## 2020-07-10 ASSESSMENT — PAIN DESCRIPTION - FREQUENCY
FREQUENCY: INTERMITTENT
FREQUENCY: INTERMITTENT

## 2020-07-10 ASSESSMENT — PAIN DESCRIPTION - PAIN TYPE
TYPE: SURGICAL PAIN
TYPE: SURGICAL PAIN

## 2020-07-10 ASSESSMENT — PAIN DESCRIPTION - PROGRESSION
CLINICAL_PROGRESSION: GRADUALLY WORSENING
CLINICAL_PROGRESSION: GRADUALLY WORSENING

## 2020-07-10 ASSESSMENT — PAIN DESCRIPTION - LOCATION
LOCATION: KNEE
LOCATION: KNEE;LEG

## 2020-07-10 ASSESSMENT — PAIN DESCRIPTION - DESCRIPTORS
DESCRIPTORS: ACHING
DESCRIPTORS: SORE

## 2020-07-10 NOTE — PROGRESS NOTES
Pt resting with eyes closed, respirs witnessed as e/e, no signs of distress. SR up x2, bed in lowest  position, wheels locked,bed alarm on, Call light and bedside table in easy reach.    Ynes Roberson RN

## 2020-07-10 NOTE — CARE COORDINATION
DISCHARGE ORDER  Date/Time 7/10/2020 9:42 AM  Completed by: Sukhdeep Manley, Case Management    Patient Name: Bari Calvo    : 1955    Admitting Diagnosis: Closed bicondylar fracture of distal femur, right, initial encounter ([facility]) [O06.292K, S72.724Y]    Financial Payer Type :  [employer]  Admit order Date and Status: 20 inpatient  (verify MD's last order for status of admission/Traditional Medicare 3 day qualifying stay required for SNF)    Noted discharge order. Confirmed discharge plan  : Yes  with whom patient  If pt confirmed DC plan does family need to be contacted by CM No Discharge Plan: Order for dc noted. Spoke with Sherif Luque at [facility] who states pre cert is completed and can accept pt today for STR. Spoke with pt at bedside who cont plan for [facility] for STR. States she will notify her family and no need for CM to call. Chart reviewed and no other dc needs identified. Pt is being d/c'd to STR today. Pt's O2 sats are 92% on RA. Discharge orders and Continuity of Care faxed to facility: Yes  Hospital Exemption Notification System complete: Yes  Transportation arranged: Yes -  [facility] @ 11:30. Patient / Family ( pt will notify) aware of  time: Yes   Nursing aware of  time: Yes  Receiving facility aware of  time: Yes  Pre-cert obtained? Yes    Discharge timeout done with pt, nsg and CM. All discharge needs and concerns addressed. Discharging nurse to complete PASHA, reconcile AVS, and place final copy with patient's discharge packet. Discharging RN to ensure that written prescriptions for  Level II medications are sent with patient to the facility as per protocol.

## 2020-07-10 NOTE — PROGRESS NOTES
Transport has arrived to take patient to facility. All of patients belongings were collected and given to transport. Ensured patients brace in place. Script was given to transport. Care complete.

## 2020-07-10 NOTE — PROGRESS NOTES
Shift assessment complete - See flow sheet. Nightly medications given - See [facility]    Patient with no complaints of pain at this time. Patient denies any further needs. Bed alarm is set for patient safety.  Call light explained and in reach

## 2020-07-10 NOTE — PROGRESS NOTES
present  Extremities: right LE ace wrap,right knee immobilizer  Neurological : oriented x3     Lab Data:  CBC:   Recent Labs     07/08/20  0529 07/09/20  0526 07/10/20  0548   WBC 5.8 5.6 6.2   RBC 2.56* 2.49* 2.44*   HGB 7.6* 7.3* 7.2*   HCT 23.3* 22.4* 22.2*   MCV 90.9 90.1 90.9   RDW 13.6 13.9 13.9    189 238     BMP:   Recent Labs     07/08/20  0529 07/10/20  0548    141   K 3.6 4.2    103   CO2 27 28   BUN 6* 9   CREATININE <0.5* <0.5*     BNP: No results for input(s): BNP in the last 72 hours. PT/INR: No results for input(s): PROTIME, INR in the last 72 hours. APTT:No results for input(s): APTT in the last 72 hours. CARDIAC ENZYMES: No results for input(s): CKMB, CKMBINDEX, TROPONINI in the last 72 hours. Invalid input(s): CKTOTAL;3  FASTING LIPID PANEL:  Lab Results   Component Value Date    CHOL 152 08/21/2018    HDL 81 08/21/2018    TRIG 93 08/21/2018     LIVER PROFILE:   No results for input(s): AST, ALT, ALB, BILIDIR, BILITOT, ALKPHOS in the last 72 hours.       CT HEAD WO CONTRAST   Final Result   No acute intracranial abnormality.           XR KNEE RIGHT (3 VIEWS)   Final Result   Displaced, comminuted periprosthetic fracture of the distal femoral   metaphysis at the proximal margin of the femoral component of the right knee   prosthesis. Assessment & Plan:     Patient Active Problem List    Diagnosis Date Noted    Closed fracture of right distal femur ([address]     Chronic obstructive pulmonary disease (HCC)     Narcotic dependence ([address]     Closed bicondylar fracture of distal femur, right, initial encounter ([facility] 07/05/2020     1) Right femur periprosthetic fracture  - Ortho consulted  - prn IV morphine- dose increased due to severe pain   s/p ORIF Right intraarticular comminuted distal femoral fracture 7/6     2) COPD   n oexacerbation  - Continue patient's inhalers  - stable  Add duonebs     3) GERD  - continue PPI    4.chronic pain with narcotic dependence    HLD. Continue lipitor    ABLA  Will follow. continues to drop  Transfuse if less than 7. Iv venofer today    Acute hypoxic resp failure   H/o COPD   IS   CXR - infiltrate- atelectasis versus pna  Duonebs  Start unasyn for possible aspiration pna  Resolved     She apparently had expressed some suicidal thoughts when she was in the emergency room. Would plan a psychiatric consult. The rehab facility would not accept her without that. Seen by psych. no need for inpatient psych        DVT Prophylaxis: lovenox  Diet: general.  Code Status: Full Code        Dispo - inpt    Ot/pt  D/c to SNF       Kelly Perales

## 2020-07-10 NOTE — PLAN OF CARE
Problem: Falls - Risk of:  Goal: Will remain free from falls  Description: Will remain free from falls  Outcome: Met This Shift     Problem: Pain:  Goal: Control of acute pain  Description: Control of acute pain  Outcome: Met This Shift     Problem: SAFETY  Goal: Free from accidental physical injury  Outcome: Met This Shift

## 2020-07-10 NOTE — FLOWSHEET NOTE
07/10/20 0815   Vital Signs   Temp 98.5 °F (36.9 °C)   Temp Source Axillary   Pulse 80   Heart Rate Source Monitor   Resp 14   /62   BP Location Right upper arm   BP Upper/Lower Upper   Patient Position Supine   Level of Consciousness 0   MEWS Score 0   Patient Currently in Pain Yes   Pain Assessment   Pain Assessment 0-10   Pain Level 8   Pain Type Surgical pain   Pain Location Knee   Pain Orientation Right   Pain Descriptors Aching   Pain Frequency Intermittent   Pain Onset Gradual   Clinical Progression Gradually worsening   Functional Pain Assessment Prevents or interferes with all active and some passive activities   Oxygen Therapy   SpO2 92 %   O2 Device None (Room air)     Pt was having some pain this morning. Prn pain medication was given. Took her morning medications, has a good appetite and ate most of her breakfast. Pt states shes been trying to lay on her sides more and move more. Denied any other questions. Call light in reach wit bed alarm on.

## 2020-07-10 NOTE — PROGRESS NOTES
Inpatient Physical Therapy Daily Treatment Note    Unit: [address]  Date:  7/10/2020  Patient Name:    Rinku Pastrana  Admitting diagnosis:  Closed bicondylar fracture of distal femur, right, initial encounter [facility]) [[address]71.379A, [address]  Admit Date:  2020  Precautions/Restrictions: Fall risk, Bed/chair alarm, Lines -IV, RLE WB Restrictions (NWB) and Knee immobilizer   Physical therapy as per protocol -nonweightbearing right lower extremity, keep knee immobilizer in place while ambulating.  When resting in bed may remove knee immobilizer and work on very gentle range of motion 0 to 30 degrees. Discharge Recommendations: SNF  DME needs for discharge: defer to facility       Therapy recommendation for EMS Transport: can transport by wheelchair with elevating leg rest    Therapy recommendations for staff:   Assist of 1 with use of rolling walker (RW), knee immobilizer with RLE (NWB) and gait belt for all transfers and ambulation to/from [facility]  to/from chair    History of Present Illness: 63 yo female admitted to [facility] on 20 after fall at home. X-ray showed bicondylar fx of distal femur. Pt with prior R TKA. Pt underwent R femur ORIF on 20. Physical therapy as per protocol -nonweightbearing right lower extremity, keep knee immobilizer in place while ambulating.  When resting in bed may remove knee immobilizer and work on very gentle range of motion 0 to 30 degrees. Home Health S4 Level Recommendation: NA  AM-PAC Mobility Score   AM-PAC Inpatient Mobility Raw Score : 16     Treatment Time: 5003 - 3032  Treatment number: 4  Timed Code Treatment Minutes: 28 minutes  Total Treatment Minutes:  28  minutes    Cognition    A&O x4   Able to follow 2 step commands    Subjective  Patient lying supine in bed with no family present   Pt agreeable to this PT tx.      Pain   Yes  Location: R front thigh  Ratin/10  Pain Medicine Status: Received pain med prior to tx     Bed Mobility   Supine to Sit:    CGA  Sit to Supine:   CGA  Rolling:   Not Tested  Scooting:   SBA    Transfer Training     Sit to stand:   CGA  Stand to sit:   CGA  Bed to Chair:   CGA with use of gait belt, rolling walker (RW) and knee immobilizer on right    Gait Training gait completed as indicated below  Distance:      8 ft  Deviations (firm surface/linoleum):  decreased lizbeth, increased CRICKET, step to pattern, decreased step length on left and decreased stance time on left  Assistive Device Used:    gait belt, rolling walker (RW) and knee immobilizer on right  Level of Assist:    SBA and CGA  Comment: Patient limited in distance due to pain on the RLE. Stair Training deferred, pt unsafe/not appropriate to complete stairs at this time    Therapeutic Exercise all completed bilaterally unless indicated  Ankle Pumps x 10 reps  Glut sets x 10 reps  Quad sets x 10 reps (cues and encouragement for 5 sec hold)  Hip abduction: x 10 reps (abduction sets on R LE)  Hip adduction/pillow squeeze: x 10 reps     Balance  Sitting:  Normal; Supervision  Comments:     Standing: Fair -; CGA  Comments: 5 min    Patient Education      Role of PT, POC, Discharge recommendations, DC recommendations, safety awareness, transfer techniques, pacing activity and calling for assist with mobility. Positioning Needs       Pt in bed and with RLE elevated on the pillow with knee immobilizer, alarm set, positioned in proper neutral alignment and pressure relief provided. Call light provided and all needs within reach    ROM Measurements N/A  Knee Flexion:  Knee Extension:     Activity Tolerance   Pt completed therapy session with Pain noted with activity. Other  N/A    Assessment :  Patient had been making slow but consistent progress. Patient was able to clear the RLE better with assistance of the LLE. Patient needed initial cueing to maintain the NWB pattern on the RLE while performing bed to [facility] transfers.   Recommending SNF upon discharge as patient functioning well below baseline, demonstrates good rehab potential and unable to return home due to limited safety awareness and pt has unreilable assist at home (pt usually assisted older mother with dementia, and unsure of son's reliability). Goals (all goals ongoing unless otherwise indicated)  To be met in 3 visits:  1). Independent with LE Ex x 10 reps     To be met in 6 visits:  1).  Supine to/from sit: Independent  2).  Sit to/from stand: CGA with maintenance of R LE NWB  3).  Bed to chair: CGA with maintenance of R LE NWB  4).  Gait: Ambulate 25 ft.   with  CGA and use of rolling walker (RW) with maintenance of R LE NWB  5).  Tolerate B LE exercises 3 sets of 10-15 reps  6).  Stand 5 minutes at RW with B UE support and no more than CGA with indep maintenance of R LE NWB     Plan   Continue with plan of care. Signature: Anabel Martines, MS PT, # [phone]      If patient discharges from this facility prior to next visit, this note will serve as the Discharge Summary.

## 2020-07-10 NOTE — PROGRESS NOTES
Pt resting with eyes closed, respirs witnessed as e/e, no signs of distress. SR up x2, bed in lowest  position, wheels locked. Call light and bedside table in easy reach.    India Hashimoto, RN

## 2020-07-16 ENCOUNTER — TELEPHONE (OUTPATIENT)
Dept: ORTHOPEDIC SURGERY | Age: 65
End: 2020-07-16

## 2020-07-22 NOTE — DISCHARGE SUMMARY
Name:  Peace Henry  Room:  0214/0214-01  MRN:    [id number]    Discharge Summary      This discharge summary is in conjunction with a complete physical exam done on the day of discharge. Attending Physician: KENNETH Brennan. Discharging Physician: KATHY Brennan Admit: 7/5/2020  Discharge:  7/10/2020    Diagnoses this Admission    Active Problems:    Closed bicondylar fracture of distal femur, right, [facility]    Closed fracture of right distal femur ([facility]    Chronic obstructive pulmonary disease ([facility]    Narcotic dependence ([facility]  Resolved Problems:    * No resolved hospital problems. *          Procedures (Please Review Full Report for Details)      CT HEAD WO CONTRAST   Final Result   No acute intracranial abnormality.           XR KNEE RIGHT (3 VIEWS)   Final Result   Displaced, comminuted periprosthetic fracture of the distal femoral   metaphysis at the proximal margin of the femoral component of the right knee   prosthesis.          Consults    Ortho  psych    HPI:  62 y.o. female presents following a fall at home. Patient states she was working on a cabinet at home when she tripped and fell, injuring her right leg. She denies head injury, loss of consciousness, syncope or near syncope. She denies any recent fever, chills, cough, cold dysuria. Hospital Course  1) Right femur periprosthetic fracture  - Ortho consulted  - prn IV morphine- dose increased due to severe pain   s/p ORIF Right intraarticular comminuted distal femoral fracture 7/6     2) COPD   n oexacerbation  - Continue patient's inhalers  - stable  Add duonebs     3) GERD  - continue PPI     4.chronic pain with narcotic dependence     HLD. Continue lipitor     ABLA  Will follow. continues to drop  Transfuse if less than 7.   Iv venofer today     Acute hypoxic resp failure   H/o COPD   IS   CXR - infiltrate- atelectasis versus pna  Duonebs  Start unasyn for possible aspiration pna  Resolved      She apparently had expressed some suicidal thoughts when she was in the emergency room. Would plan a psychiatric consult. The rehab facility would not accept her without that. Seen by psych. no need for inpatient psych        DVT Prophylaxis: lovenox  Diet: general.  Code Status: Full Code        Dispo - inpt     Ot/pt  D/c to SNF     Discharge Medications     Medication List      START taking these medications    ferrous sulfate 325 (65 Fe) MG tablet  Commonly known as:  IRON 325  Take 1 tablet by mouth 2 times daily        CONTINUE taking these medications    albuterol sulfate  (90 Base) MCG/ACT inhaler  Use 2 puffs 4 times daily for 7 days then as needed for wheezing. Dispense with Spacer and instruct in use. At patient's preference may use 60 dose MDI. May Sub Pro-Air or Proventil as needed per insurance. atorvastatin 40 MG tablet  Commonly known as:  LIPITOR     baclofen 10 MG tablet  Commonly known as:  LIORESAL     esomeprazole Magnesium 40 MG Pack  Commonly known as:  NEXIUM     fluticasone-salmeterol 250-50 MCG/DOSE Aepb  Commonly known as:  ADVAIR     gabapentin 300 MG capsule  Commonly known as:  NEURONTIN     PARoxetine 40 MG tablet  Commonly known as:  PAXIL     traZODone 100 MG tablet  Commonly known as:  DESYREL     zafirlukast 20 MG tablet  Commonly known as:  ACCOLATE        STOP taking these medications    oxyCODONE-acetaminophen 7.5-325 MG per tablet  Commonly known as:  PERCOCET     promethazine 25 MG tablet  Commonly known as:  PHENERGAN        ASK your doctor about these medications    amoxicillin-clavulanate 500-125 MG per tablet  Commonly known as: Augmentin  Take 1 tablet by mouth 3 times daily for 4 days  Ask about: Should I take this medication?     oxyCODONE-acetaminophen 7.5-325 MG per tablet  Commonly known as:  PERCOCET  Take 1 tablet by mouth every 6 hours as needed for Pain for up to 2 days. Ask about: Should I take this medication?            Where to Get Your Medications

## 2020-08-06 ENCOUNTER — OFFICE VISIT (OUTPATIENT)
Dept: ORTHOPEDIC SURGERY | Age: 65
End: 2020-08-06

## 2020-08-06 VITALS — BODY MASS INDEX: 22.82 KG/M2 | WEIGHT: 137 LBS | HEIGHT: 65 IN

## 2020-08-06 PROCEDURE — 99024 POSTOP FOLLOW-UP VISIT: CPT | Performed by: ORTHOPAEDIC SURGERY

## 2020-08-06 NOTE — PROGRESS NOTES
DIAGNOSIS:  Right periprosthetic femur fracture status post ORIF    DATE OF SURGERY:  20 . HISTORY OF PRESENT ILLNESS: Ms. Goran Main 62 y.o. female  who came in today for 4 weeks postoperative visit. The patient denies any significant pain in the right leg. She has been working on ROM and non WB with home health. No numbness or tingling sensation. No fever or Chills. PHYSICAL EXAMINATION:    Ht 5' 5\" (1.651 m)   Wt 137 lb (62.1 kg)   BMI 22.80 kg/m²     Pain Assessment:  Pain Assessment  Location of Pain: Leg  Location Modifiers: Right  Severity of Pain: 5  Quality of Pain: Aching  Duration of Pain: A few days  Frequency of Pain: Intermittent  Aggravating Factors: Walking  Limiting Behavior: Yes  Relieving Factors: Rest  Result of Injury: Yes  Work-Related Injury: No  Are there other pain locations you wish to document?: No    Patient is awake, alert, and in no acute distress. The incision is healed well. No signs of any erythema or drainage. She has no pain with the active or passive range of motion of the right  knee. She has intact sensation to light touch distally, and is neurovascularly intact. IMAGIN views right are obtained and reviewed today show anatomic alignment of the distal femur periprosthetic fracture, hardware is in good position, no loosening, or hardware failure. IMPRESSION:    4 weeks status post right periprosthetic femur fracture status post ORIF     Diagnosis Orders   1. Right knee pain, unspecified chronicity  XR KNEE RIGHT (1-2 VIEWS)    External Referral To Physical Therapy   2. Closed fracture of distal end of right femur with routine healing, unspecified fracture morphology, subsequent encounter           PLAN:    I have told the patient to work on ROM with  PT, TTWB WB as well as strengthening exercises. They have to do all range of motion as tolerated to the right knee and hip, she can discontinue use of the knee immobilizer.   The patient will come back for a follow up in 6 weeks. At that time, we will take Two views right right knee. Pedro Yoder        This dictation was performed with a verbal recognition program (DRAGON) and it was checked for errors. It is possible that there are still dictated errors within this office note. If so, please bring any errors to my attention for an addendum. All efforts were made to ensure that this office note is accurate.

## 2020-09-17 ENCOUNTER — OFFICE VISIT (OUTPATIENT)
Dept: ORTHOPEDIC SURGERY | Age: 65
End: 2020-09-17

## 2020-09-17 PROCEDURE — 99024 POSTOP FOLLOW-UP VISIT: CPT | Performed by: ORTHOPAEDIC SURGERY

## 2020-09-17 NOTE — PROGRESS NOTES
DIAGNOSIS:  Right periprosthetic femur fracture status post ORIF    DATE OF SURGERY:  20 . HISTORY OF PRESENT ILLNESS: Ms. Jada Dhillon 62 y.o. female  who came in today for 10 weeks postoperative visit. Continues to be partial weightbearing with a walker, has 2 out of 10 pain, is quite pleased with her progress and working with home health therapist.  She would like to stop using a walker if possible. PHYSICAL EXAMINATION:    There were no vitals taken for this visit. Pain Assessment:       Patient is awake, alert, and in no acute distress. The incision is healed well. No signs of any erythema or drainage. She has no pain with the active or passive range of motion of the right  knee. Tolerates range of motion 0 to 120 degrees  She has intact sensation to light touch distally, and is neurovascularly intact. IMAGIN views right knee are obtained and reviewed today show anatomic alignment of the distal femur periprosthetic fracture, hardware is in good position, no loosening, or hardware failure. I  Do see callus forming specifically on the AP x-ray      IMPRESSION:    10 weeks status post right periprosthetic femur fracture status post ORIF     Diagnosis Orders   1. Closed fracture of distal end of right femur with routine healing, unspecified fracture morphology, subsequent encounter  XR KNEE RIGHT (1-2 VIEWS)         PLAN:    Continue to work with physical therapy on range of motion and strengthening. Progress weightbearing to 50%, continue to use a walker. The patient will come back for a follow up in 4 weeks. At that time, we will take Two views right right knee. Frieda Reeves        This dictation was performed with a verbal recognition program (DRAGON) and it was checked for errors. It is possible that there are still dictated errors within this office note. If so, please bring any errors to my attention for an addendum.  All efforts were made to ensure that this office note is accurate.

## 2020-09-28 ENCOUNTER — OFFICE VISIT (OUTPATIENT)
Dept: ORTHOPEDIC SURGERY | Age: 65
End: 2020-09-28

## 2020-09-28 ENCOUNTER — TELEPHONE (OUTPATIENT)
Dept: ORTHOPEDIC SURGERY | Age: 65
End: 2020-09-28

## 2020-09-28 VITALS — WEIGHT: 137 LBS | BODY MASS INDEX: 22.82 KG/M2 | HEIGHT: 65 IN

## 2020-09-28 PROCEDURE — 99024 POSTOP FOLLOW-UP VISIT: CPT | Performed by: ORTHOPAEDIC SURGERY

## 2020-09-28 RX ORDER — METHYLPREDNISOLONE 4 MG/1
TABLET ORAL
Qty: 1 KIT | Refills: 0 | Status: SHIPPED | OUTPATIENT
Start: 2020-09-28 | End: 2021-09-10

## 2020-09-28 NOTE — PROGRESS NOTES
DIAGNOSIS:  Right periprosthetic femur fracture status post ORIF    DATE OF SURGERY:  20 . HISTORY OF PRESENT ILLNESS: Ms. Suzy Jimenez 62 y.o. female  who came in today for valuation of the right femur. Patient is increased to 50% weightbearing at her last visit, states she has had 7 out of 10 pain beginning last Thursday, increased pain with range of motion. Denies fevers, chills, does have this swelling to the knee. Is ambulating with a walker. PHYSICAL EXAMINATION:    Ht 5' 5\" (1.651 m)   Wt 137 lb (62.1 kg)   BMI 22.80 kg/m²     Pain Assessment:       Patient is awake, alert, and in no acute distress. The incision is healed well. No signs of erythema or drainage. She does have slight warmth over the incision site there are no other signs of infection. Patient tolerates range of motion 0 to 90 degrees both actively and passively today. Knee is stable with anterior posterior drawer, varus and valgus stress. She has intact sensation to light touch distally, and is neurovascularly intact. IMAGIN views right knee are obtained and reviewed today show anatomic alignment of the distal femur periprosthetic fracture, hardware is in good position, no loosening, or hardware failure. I  Do see callus forming specifically on the AP x-ray. xrays are unchanged when compared with those obtained last week. IMPRESSION:    11 weeks status post right periprosthetic femur fracture status post ORIF increased pain since her last visit     Diagnosis Orders   1. Closed fracture of distal end of right femur with routine healing, unspecified fracture morphology, subsequent encounter  XR KNEE RIGHT (1-2 VIEWS)    diclofenac sodium (VOLTAREN) 1 % GEL    methylPREDNISolone (MEDROL, CORRINE,) 4 MG tablet         PLAN:    With her increasing weightbearing last week she overdid it acute flareup of inflammation. I provided a Medrol Dosepak, she will use Voltaren cream she has at home.   She will take a step back and 25% weightbearing/toe-touch weightbearing until her symptoms improve. She will contact her office if she has increased erythema, drainage, further pain with range of motion, fevers or chills. Assuming her symptoms improve with the steroid and rest she can slowly work her way back up to 50% weightbearing and follow-up in 4 weeks. Has not seen improvement over the next week or 2 she will follow-up sooner. Patient agrees with this plan, all of their questions were answered best of our ability and to their satisfaction. We will take xrays Two views right  Knee at her next visit. Fernando Thrasher        This dictation was performed with a verbal recognition program (DRAGON) and it was checked for errors. It is possible that there are still dictated errors within this office note. If so, please bring any errors to my attention for an addendum. All efforts were made to ensure that this office note is accurate.

## 2020-10-15 ENCOUNTER — OFFICE VISIT (OUTPATIENT)
Dept: ORTHOPEDIC SURGERY | Age: 65
End: 2020-10-15
Payer: MEDICARE

## 2020-10-15 VITALS — BODY MASS INDEX: 22.81 KG/M2 | HEIGHT: 65 IN | WEIGHT: 136.91 LBS

## 2020-10-15 PROCEDURE — G8427 DOCREV CUR MEDS BY ELIG CLIN: HCPCS | Performed by: ORTHOPAEDIC SURGERY

## 2020-10-15 PROCEDURE — G8484 FLU IMMUNIZE NO ADMIN: HCPCS | Performed by: ORTHOPAEDIC SURGERY

## 2020-10-15 PROCEDURE — G8420 CALC BMI NORM PARAMETERS: HCPCS | Performed by: ORTHOPAEDIC SURGERY

## 2020-10-15 PROCEDURE — 3017F COLORECTAL CA SCREEN DOC REV: CPT | Performed by: ORTHOPAEDIC SURGERY

## 2020-10-15 PROCEDURE — 4004F PT TOBACCO SCREEN RCVD TLK: CPT | Performed by: ORTHOPAEDIC SURGERY

## 2020-10-15 PROCEDURE — 99213 OFFICE O/P EST LOW 20 MIN: CPT | Performed by: ORTHOPAEDIC SURGERY

## 2020-10-15 NOTE — PROGRESS NOTES
DIAGNOSIS:  Right periprosthetic femur fracture status post ORIF    DATE OF SURGERY:  20 . HISTORY OF PRESENT ILLNESS: Ms. Huan Moran 62 y.o. female  who came in today for valuation of the right femur. Patient had increased pain after increasing weight to 50%. I took her back down to 25% weightbearing with the walker and provided a Medrol Dosepak. She is here today for repeat x-ray states her pain has significantly improved, 3 out of 10 discomfort today ambulating with a walker. PHYSICAL EXAMINATION:    Ht 5' 5\" (1.651 m)   Wt 136 lb 14.5 oz (62.1 kg)   BMI 22.78 kg/m²     Pain Assessment:  Pain Assessment  Location of Pain: Other (Comment)(femur)  Location Modifiers: Right  Severity of Pain: 3  Quality of Pain: Sharp, Dull, Aching  Duration of Pain: Persistent  Frequency of Pain: Constant  Aggravating Factors: Stairs, Walking, Standing, Squatting, Kneeling, Exercise, Straightening, Stretching, Bending  Limiting Behavior: Yes  Relieving Factors: Rest  Result of Injury: No  Work-Related Injury: No  Are there other pain locations you wish to document?: No    Patient is awake, alert, and in no acute distress. The incision is healed well there is no erythema or drainage. She tolerates range of motion 0 to 95 degrees. Knee is stable with anterior posterior drawer, varus and valgus stress. She has intact sensation to light touch distally, and is neurovascularly intact. IMAGIN views right knee are obtained and reviewed today show anatomic alignment of the distal femur periprosthetic fracture, hardware is in good position, no loosening, or hardware failure. continue to see callus formation and this is improving since initial x-rays. IMPRESSION:    13 weeks status post right periprosthetic femur fracture status post ORIF increased pain since her last visit     Diagnosis Orders   1.  Closed fracture of distal end of right femur with routine healing, unspecified fracture morphology, subsequent encounter  XR KNEE RIGHT (1-2 VIEWS)         PLAN:    She overdid it with the increase in weightbearing at a previous visit. She will continue to be 25 to 50% weightbearing with the walker and follow-up with me in 1 month for repeat x-rays of the right knee. Gary Simon        This dictation was performed with a verbal recognition program (DRAGON) and it was checked for errors. It is possible that there are still dictated errors within this office note. If so, please bring any errors to my attention for an addendum. All efforts were made to ensure that this office note is accurate.

## 2020-11-12 ENCOUNTER — OFFICE VISIT (OUTPATIENT)
Dept: ORTHOPEDIC SURGERY | Age: 65
End: 2020-11-12
Payer: MEDICARE

## 2020-11-12 VITALS — BODY MASS INDEX: 22.66 KG/M2 | HEIGHT: 65 IN | WEIGHT: 136 LBS

## 2020-11-12 PROCEDURE — 3017F COLORECTAL CA SCREEN DOC REV: CPT | Performed by: ORTHOPAEDIC SURGERY

## 2020-11-12 PROCEDURE — G8427 DOCREV CUR MEDS BY ELIG CLIN: HCPCS | Performed by: ORTHOPAEDIC SURGERY

## 2020-11-12 PROCEDURE — G8420 CALC BMI NORM PARAMETERS: HCPCS | Performed by: ORTHOPAEDIC SURGERY

## 2020-11-12 PROCEDURE — 4004F PT TOBACCO SCREEN RCVD TLK: CPT | Performed by: ORTHOPAEDIC SURGERY

## 2020-11-12 PROCEDURE — G8484 FLU IMMUNIZE NO ADMIN: HCPCS | Performed by: ORTHOPAEDIC SURGERY

## 2020-11-12 PROCEDURE — 99213 OFFICE O/P EST LOW 20 MIN: CPT | Performed by: ORTHOPAEDIC SURGERY

## 2020-11-12 NOTE — LETTER
[address] [place] [id number]  Phone: [phone]  Fax: [phone]    Hiram Felix DO        November 12, 2020    [address] [place] [id number]      Dear Pharmacy    Patient is in need of a walker with wheels on it. If you have any questions or concerns, please don't hesitate to call.     Sincerely,        Hiram Felix DO  [facility] [address] [facility] partner of [place], [place]

## 2020-11-12 NOTE — PROGRESS NOTES
DIAGNOSIS:  Right periprosthetic femur fracture status post ORIF    DATE OF SURGERY:  20 . HISTORY OF PRESENT ILLNESS: Ms. Viviana Le 62 y.o. female  who came in today for valuation of the right femur. Patient had increased pain after increasing weight to 100% ambulating with a cane, she states she took a fall directly onto the right knee today. She is here today with a family member. She currently has 7 out of 10 pain since the fall. PHYSICAL EXAMINATION:    Ht 5' 5\" (1.651 m)   Wt 136 lb (61.7 kg)   BMI 22.63 kg/m²     Pain Assessment:  Pain Assessment  Location of Pain: Leg  Location Modifiers: Right  Quality of Pain: Aching, Dull, Throbbing  Limiting Behavior: Yes  Result of Injury: Yes  Work-Related Injury: No  Are there other pain locations you wish to document?: No    Patient is awake, alert, and in no acute distress. The incision is healed well there is no erythema or drainage. She tolerates range of motion 0 to 90 degrees. Knee is stable with anterior posterior drawer, varus and valgus stress. She has intact sensation to light touch distally, and is neurovascularly intact. IMAGIN views right knee are obtained and reviewed today show anatomic alignment of the distal femur periprosthetic fracture, hardware is in good position, no loosening, or hardware failure. continue to see callus formation and this is improving since initial x-rays. IMPRESSION:    4 months status post right periprosthetic femur fracture status post ORIF      Diagnosis Orders   1. Closed fracture of distal end of right femur with routine healing, unspecified fracture morphology, subsequent encounter  XR KNEE RIGHT (1-2 VIEWS)    Walker Medline         PLAN:    Provided a walker today she feels more stable, and follow-up with me in 1 month for repeat x-rays of the right knee. Marci Ram        This dictation was performed with a verbal recognition program (DRAGON) and it was checked for errors.  It

## 2021-06-02 ENCOUNTER — CLINICAL DOCUMENTATION (OUTPATIENT)
Dept: OTHER | Age: 66
End: 2021-06-02

## 2021-09-10 ENCOUNTER — APPOINTMENT (OUTPATIENT)
Dept: GENERAL RADIOLOGY | Age: 66
DRG: 190 | End: 2021-09-10
Payer: MEDICARE

## 2021-09-10 ENCOUNTER — APPOINTMENT (OUTPATIENT)
Dept: CT IMAGING | Age: 66
DRG: 190 | End: 2021-09-10
Payer: MEDICARE

## 2021-09-10 ENCOUNTER — HOSPITAL ENCOUNTER (INPATIENT)
Age: 66
LOS: 1 days | Discharge: HOME | DRG: 190 | End: 2021-09-11
Attending: EMERGENCY MEDICINE | Admitting: INTERNAL MEDICINE
Payer: MEDICARE

## 2021-09-10 DIAGNOSIS — J44.1 COPD EXACERBATION (HCC): ICD-10-CM

## 2021-09-10 DIAGNOSIS — R10.84 GENERALIZED ABDOMINAL PAIN: ICD-10-CM

## 2021-09-10 DIAGNOSIS — R91.1 PULMONARY NODULE: ICD-10-CM

## 2021-09-10 DIAGNOSIS — J44.1 CHRONIC OBSTRUCTIVE PULMONARY DISEASE WITH ACUTE EXACERBATION (HCC): ICD-10-CM

## 2021-09-10 DIAGNOSIS — J96.01 ACUTE RESPIRATORY FAILURE WITH HYPOXIA (HCC): Primary | ICD-10-CM

## 2021-09-10 LAB
A/G RATIO: 1.7 (ref 1.1–2.2)
ALBUMIN SERPL-MCNC: 4.5 G/DL (ref 3.4–5)
ALP BLD-CCNC: 105 U/L (ref 40–129)
ALT SERPL-CCNC: 8 U/L (ref 10–40)
ANION GAP SERPL CALCULATED.3IONS-SCNC: 11 MMOL/L (ref 3–16)
AST SERPL-CCNC: 14 U/L (ref 15–37)
BASOPHILS ABSOLUTE: 0.1 K/UL (ref 0–0.2)
BASOPHILS RELATIVE PERCENT: 0.9 %
BILIRUB SERPL-MCNC: 0.3 MG/DL (ref 0–1)
BUN BLDV-MCNC: 17 MG/DL (ref 7–20)
CALCIUM SERPL-MCNC: 9.7 MG/DL (ref 8.3–10.6)
CHLORIDE BLD-SCNC: 102 MMOL/L (ref 99–110)
CO2: 26 MMOL/L (ref 21–32)
CREAT SERPL-MCNC: 1 MG/DL (ref 0.6–1.2)
EKG ATRIAL RATE: 82 BPM
EKG DIAGNOSIS: NORMAL
EKG P AXIS: 87 DEGREES
EKG P-R INTERVAL: 134 MS
EKG Q-T INTERVAL: 376 MS
EKG QRS DURATION: 96 MS
EKG QTC CALCULATION (BAZETT): 439 MS
EKG R AXIS: 83 DEGREES
EKG T AXIS: 84 DEGREES
EKG VENTRICULAR RATE: 82 BPM
EOSINOPHILS ABSOLUTE: 0.3 K/UL (ref 0–0.6)
EOSINOPHILS RELATIVE PERCENT: 2.7 %
GFR AFRICAN AMERICAN: >60
GFR NON-AFRICAN AMERICAN: 56
GLOBULIN: 2.6 G/DL
GLUCOSE BLD-MCNC: 120 MG/DL (ref 70–99)
HCT VFR BLD CALC: 39 % (ref 36–48)
HEMOGLOBIN: 13.1 G/DL (ref 12–16)
LYMPHOCYTES ABSOLUTE: 1.1 K/UL (ref 1–5.1)
LYMPHOCYTES RELATIVE PERCENT: 10.4 %
MCH RBC QN AUTO: 28.6 PG (ref 26–34)
MCHC RBC AUTO-ENTMCNC: 33.6 G/DL (ref 31–36)
MCV RBC AUTO: 85.2 FL (ref 80–100)
MONOCYTES ABSOLUTE: 0.9 K/UL (ref 0–1.3)
MONOCYTES RELATIVE PERCENT: 8.9 %
NEUTROPHILS ABSOLUTE: 7.9 K/UL (ref 1.7–7.7)
NEUTROPHILS RELATIVE PERCENT: 77.1 %
PDW BLD-RTO: 14.2 % (ref 12.4–15.4)
PLATELET # BLD: 275 K/UL (ref 135–450)
PMV BLD AUTO: 6.7 FL (ref 5–10.5)
POTASSIUM REFLEX MAGNESIUM: 4.7 MMOL/L (ref 3.5–5.1)
PROCALCITONIN: 0.03 NG/ML (ref 0–0.15)
RBC # BLD: 4.58 M/UL (ref 4–5.2)
SARS-COV-2, NAAT: NOT DETECTED
SODIUM BLD-SCNC: 139 MMOL/L (ref 136–145)
TOTAL PROTEIN: 7.1 G/DL (ref 6.4–8.2)
TROPONIN: <0.01 NG/ML
WBC # BLD: 10.3 K/UL (ref 4–11)

## 2021-09-10 PROCEDURE — 71045 X-RAY EXAM CHEST 1 VIEW: CPT

## 2021-09-10 PROCEDURE — 94640 AIRWAY INHALATION TREATMENT: CPT

## 2021-09-10 PROCEDURE — 74176 CT ABD & PELVIS W/O CONTRAST: CPT

## 2021-09-10 PROCEDURE — 6360000002 HC RX W HCPCS: Performed by: INTERNAL MEDICINE

## 2021-09-10 PROCEDURE — G0378 HOSPITAL OBSERVATION PER HR: HCPCS

## 2021-09-10 PROCEDURE — 84145 PROCALCITONIN (PCT): CPT

## 2021-09-10 PROCEDURE — 96372 THER/PROPH/DIAG INJ SC/IM: CPT

## 2021-09-10 PROCEDURE — 6370000000 HC RX 637 (ALT 250 FOR IP): Performed by: EMERGENCY MEDICINE

## 2021-09-10 PROCEDURE — 6360000002 HC RX W HCPCS: Performed by: EMERGENCY MEDICINE

## 2021-09-10 PROCEDURE — 96374 THER/PROPH/DIAG INJ IV PUSH: CPT

## 2021-09-10 PROCEDURE — 93005 ELECTROCARDIOGRAM TRACING: CPT | Performed by: EMERGENCY MEDICINE

## 2021-09-10 PROCEDURE — 93010 ELECTROCARDIOGRAM REPORT: CPT | Performed by: INTERNAL MEDICINE

## 2021-09-10 PROCEDURE — 6370000000 HC RX 637 (ALT 250 FOR IP): Performed by: INTERNAL MEDICINE

## 2021-09-10 PROCEDURE — 2580000003 HC RX 258: Performed by: EMERGENCY MEDICINE

## 2021-09-10 PROCEDURE — 87635 SARS-COV-2 COVID-19 AMP PRB: CPT

## 2021-09-10 PROCEDURE — 1200000000 HC SEMI PRIVATE

## 2021-09-10 PROCEDURE — 84484 ASSAY OF TROPONIN QUANT: CPT

## 2021-09-10 PROCEDURE — 96375 TX/PRO/DX INJ NEW DRUG ADDON: CPT

## 2021-09-10 PROCEDURE — 85025 COMPLETE CBC W/AUTO DIFF WBC: CPT

## 2021-09-10 PROCEDURE — 99284 EMERGENCY DEPT VISIT MOD MDM: CPT

## 2021-09-10 PROCEDURE — 80053 COMPREHEN METABOLIC PANEL: CPT

## 2021-09-10 PROCEDURE — 6360000004 HC RX CONTRAST MEDICATION: Performed by: EMERGENCY MEDICINE

## 2021-09-10 PROCEDURE — 71260 CT THORAX DX C+: CPT

## 2021-09-10 RX ORDER — ALBUTEROL SULFATE 90 UG/1
2 AEROSOL, METERED RESPIRATORY (INHALATION) EVERY 6 HOURS PRN
Status: DISCONTINUED | OUTPATIENT
Start: 2021-09-10 | End: 2021-09-10

## 2021-09-10 RX ORDER — GUAIFENESIN 600 MG/1
600 TABLET, EXTENDED RELEASE ORAL 2 TIMES DAILY
Status: DISCONTINUED | OUTPATIENT
Start: 2021-09-10 | End: 2021-09-11 | Stop reason: HOSPADM

## 2021-09-10 RX ORDER — SODIUM CHLORIDE 9 MG/ML
25 INJECTION, SOLUTION INTRAVENOUS PRN
Status: DISCONTINUED | OUTPATIENT
Start: 2021-09-10 | End: 2021-09-11 | Stop reason: HOSPADM

## 2021-09-10 RX ORDER — 0.9 % SODIUM CHLORIDE 0.9 %
1000 INTRAVENOUS SOLUTION INTRAVENOUS ONCE
Status: COMPLETED | OUTPATIENT
Start: 2021-09-10 | End: 2021-09-10

## 2021-09-10 RX ORDER — SODIUM CHLORIDE 0.9 % (FLUSH) 0.9 %
5-40 SYRINGE (ML) INJECTION EVERY 12 HOURS SCHEDULED
Status: DISCONTINUED | OUTPATIENT
Start: 2021-09-10 | End: 2021-09-11 | Stop reason: HOSPADM

## 2021-09-10 RX ORDER — PAROXETINE HYDROCHLORIDE 20 MG/1
40 TABLET, FILM COATED ORAL EVERY MORNING
Status: DISCONTINUED | OUTPATIENT
Start: 2021-09-11 | End: 2021-09-11 | Stop reason: HOSPADM

## 2021-09-10 RX ORDER — SODIUM CHLORIDE 0.9 % (FLUSH) 0.9 %
5-40 SYRINGE (ML) INJECTION PRN
Status: DISCONTINUED | OUTPATIENT
Start: 2021-09-10 | End: 2021-09-11 | Stop reason: HOSPADM

## 2021-09-10 RX ORDER — ACETAMINOPHEN 650 MG/1
650 SUPPOSITORY RECTAL EVERY 6 HOURS PRN
Status: DISCONTINUED | OUTPATIENT
Start: 2021-09-10 | End: 2021-09-11 | Stop reason: HOSPADM

## 2021-09-10 RX ORDER — PROCHLORPERAZINE EDISYLATE 5 MG/ML
10 INJECTION INTRAMUSCULAR; INTRAVENOUS EVERY 6 HOURS PRN
Status: DISCONTINUED | OUTPATIENT
Start: 2021-09-10 | End: 2021-09-11 | Stop reason: HOSPADM

## 2021-09-10 RX ORDER — METHYLPREDNISOLONE SODIUM SUCCINATE 125 MG/2ML
125 INJECTION, POWDER, LYOPHILIZED, FOR SOLUTION INTRAMUSCULAR; INTRAVENOUS ONCE
Status: DISCONTINUED | OUTPATIENT
Start: 2021-09-10 | End: 2021-09-11 | Stop reason: HOSPADM

## 2021-09-10 RX ORDER — TRAZODONE HYDROCHLORIDE 50 MG/1
100 TABLET ORAL NIGHTLY
Status: DISCONTINUED | OUTPATIENT
Start: 2021-09-10 | End: 2021-09-11 | Stop reason: HOSPADM

## 2021-09-10 RX ORDER — PANTOPRAZOLE SODIUM 40 MG/1
40 TABLET, DELAYED RELEASE ORAL
Status: DISCONTINUED | OUTPATIENT
Start: 2021-09-11 | End: 2021-09-11 | Stop reason: HOSPADM

## 2021-09-10 RX ORDER — ACETAMINOPHEN 325 MG/1
650 TABLET ORAL EVERY 6 HOURS PRN
Status: DISCONTINUED | OUTPATIENT
Start: 2021-09-10 | End: 2021-09-11 | Stop reason: HOSPADM

## 2021-09-10 RX ORDER — BACLOFEN 10 MG/1
10 TABLET ORAL 2 TIMES DAILY
Status: DISCONTINUED | OUTPATIENT
Start: 2021-09-10 | End: 2021-09-11 | Stop reason: HOSPADM

## 2021-09-10 RX ORDER — ATORVASTATIN CALCIUM 10 MG/1
20 TABLET, FILM COATED ORAL DAILY
Status: DISCONTINUED | OUTPATIENT
Start: 2021-09-10 | End: 2021-09-11 | Stop reason: HOSPADM

## 2021-09-10 RX ORDER — GABAPENTIN 300 MG/1
300 CAPSULE ORAL 2 TIMES DAILY
Status: DISCONTINUED | OUTPATIENT
Start: 2021-09-10 | End: 2021-09-11 | Stop reason: HOSPADM

## 2021-09-10 RX ORDER — IPRATROPIUM BROMIDE AND ALBUTEROL SULFATE 2.5; .5 MG/3ML; MG/3ML
1 SOLUTION RESPIRATORY (INHALATION)
Status: DISCONTINUED | OUTPATIENT
Start: 2021-09-10 | End: 2021-09-11 | Stop reason: HOSPADM

## 2021-09-10 RX ORDER — FERROUS SULFATE 325(65) MG
325 TABLET ORAL 2 TIMES DAILY
Status: DISCONTINUED | OUTPATIENT
Start: 2021-09-10 | End: 2021-09-11 | Stop reason: HOSPADM

## 2021-09-10 RX ORDER — MORPHINE SULFATE 2 MG/ML
4 INJECTION, SOLUTION INTRAMUSCULAR; INTRAVENOUS ONCE
Status: COMPLETED | OUTPATIENT
Start: 2021-09-10 | End: 2021-09-10

## 2021-09-10 RX ORDER — METHYLPREDNISOLONE SODIUM SUCCINATE 125 MG/2ML
125 INJECTION, POWDER, LYOPHILIZED, FOR SOLUTION INTRAMUSCULAR; INTRAVENOUS EVERY 6 HOURS
Status: DISCONTINUED | OUTPATIENT
Start: 2021-09-10 | End: 2021-09-10

## 2021-09-10 RX ORDER — ZAFIRLUKAST 20 MG/1
20 TABLET, FILM COATED ORAL 2 TIMES DAILY
Status: DISCONTINUED | OUTPATIENT
Start: 2021-09-10 | End: 2021-09-11 | Stop reason: HOSPADM

## 2021-09-10 RX ORDER — ONDANSETRON 2 MG/ML
4 INJECTION INTRAMUSCULAR; INTRAVENOUS ONCE
Status: COMPLETED | OUTPATIENT
Start: 2021-09-10 | End: 2021-09-10

## 2021-09-10 RX ORDER — POLYETHYLENE GLYCOL 3350 17 G/17G
17 POWDER, FOR SOLUTION ORAL DAILY PRN
Status: DISCONTINUED | OUTPATIENT
Start: 2021-09-10 | End: 2021-09-11 | Stop reason: HOSPADM

## 2021-09-10 RX ORDER — METHYLPREDNISOLONE SODIUM SUCCINATE 40 MG/ML
40 INJECTION, POWDER, LYOPHILIZED, FOR SOLUTION INTRAMUSCULAR; INTRAVENOUS EVERY 12 HOURS
Status: CANCELLED | OUTPATIENT
Start: 2021-09-10

## 2021-09-10 RX ADMIN — FERROUS SULFATE TAB 325 MG (65 MG ELEMENTAL FE) 325 MG: 325 (65 FE) TAB at 20:53

## 2021-09-10 RX ADMIN — ACETAMINOPHEN 650 MG: 325 TABLET ORAL at 20:53

## 2021-09-10 RX ADMIN — ENOXAPARIN SODIUM 40 MG: 40 INJECTION SUBCUTANEOUS at 18:57

## 2021-09-10 RX ADMIN — Medication 2 PUFF: at 13:41

## 2021-09-10 RX ADMIN — MORPHINE SULFATE 4 MG: 2 INJECTION, SOLUTION INTRAMUSCULAR; INTRAVENOUS at 13:40

## 2021-09-10 RX ADMIN — ONDANSETRON 4 MG: 2 INJECTION INTRAMUSCULAR; INTRAVENOUS at 13:40

## 2021-09-10 RX ADMIN — IOPAMIDOL 75 ML: 755 INJECTION, SOLUTION INTRAVENOUS at 14:24

## 2021-09-10 RX ADMIN — BACLOFEN 10 MG: 10 TABLET ORAL at 20:54

## 2021-09-10 RX ADMIN — METHYLPREDNISOLONE SODIUM SUCCINATE 125 MG: 125 INJECTION, POWDER, FOR SOLUTION INTRAMUSCULAR; INTRAVENOUS at 13:40

## 2021-09-10 RX ADMIN — GABAPENTIN 300 MG: 300 CAPSULE ORAL at 20:53

## 2021-09-10 RX ADMIN — TRAZODONE HYDROCHLORIDE 100 MG: 50 TABLET ORAL at 20:53

## 2021-09-10 RX ADMIN — IPRATROPIUM BROMIDE AND ALBUTEROL SULFATE 1 AMPULE: .5; 3 SOLUTION RESPIRATORY (INHALATION) at 20:42

## 2021-09-10 RX ADMIN — ATORVASTATIN CALCIUM 20 MG: 10 TABLET, FILM COATED ORAL at 18:57

## 2021-09-10 RX ADMIN — SODIUM CHLORIDE 1000 ML: 9 INJECTION, SOLUTION INTRAVENOUS at 13:58

## 2021-09-10 ASSESSMENT — PAIN SCALES - GENERAL
PAINLEVEL_OUTOF10: 8
PAINLEVEL_OUTOF10: 7
PAINLEVEL_OUTOF10: 8
PAINLEVEL_OUTOF10: 6
PAINLEVEL_OUTOF10: 7
PAINLEVEL_OUTOF10: 0

## 2021-09-10 ASSESSMENT — PAIN DESCRIPTION - LOCATION
LOCATION: BACK;HEAD;KNEE
LOCATION: RIB CAGE

## 2021-09-10 ASSESSMENT — PAIN DESCRIPTION - PAIN TYPE
TYPE: ACUTE PAIN;CHRONIC PAIN
TYPE: ACUTE PAIN

## 2021-09-10 NOTE — H&P
Hospital Medicine History & Physical      PCP: Lul Claros    Date of Admission: 9/10/2021    Date of Service: Pt seen/examined on 09/10/21 and Admitted to Inpatient with expected LOS greater than two midnights due to medical therapy. Chief Complaint:  Dyspnea, cough    History Of Present Illness: The patient is a pleasant 60 Y F with a h/o former smoking, COPD, and fibromyalgia. She has had two family members die recently (one from [facility] and the other from lung cancer) and she has been grieving. She is also stressed out because she cares for her mother who has alzheimer's. For the last 2-3 weeks she has been more short of breath than normal, but she was just attributing it to stress. Worse with exertion. Her cough is more intense and more frequent than normal.  No fevers or chills. No sick contacts and she is vaccinated for COVID. No chest pain. No edema or orthopnea. She came to the ED and was found to be wheezing badly. CT showed severe emphysema. Past Medical History:          Diagnosis Date    COPD (chronic obstructive pulmonary disease) ([address]     DDD (degenerative disc disease)     Fibromyalgia     Sciatica        Past Surgical History:          Procedure Laterality Date    CHOLECYSTECTOMY      FEMUR FRACTURE SURGERY Right 7/6/2020    RIGHT FEMUR OPEN REDUCTION INTERNAL FIXATION performed by Zach Agee DO at [facility] [address]      KNEE SURGERY      OTHER SURGICAL HISTORY      RIGHT FEMUR OPEN REDUCTION INTERNAL FIXATION (Right    TONSILLECTOMY         Medications Prior to Admission:      Prior to Admission medications    Medication Sig Start Date End Date Taking?  Authorizing Provider   diclofenac sodium (VOLTAREN) 1 % GEL Apply 2 g topically 4 times daily 9/28/20   Elias Rose DO   ferrous sulfate (IRON 325) 325 (65 Fe) MG tablet Take 1 tablet by mouth 2 times daily 7/10/20   Govind Mcarthur MD   PARoxetine (PAXIL) 40 MG tablet Take 40 mg by mouth every morning    Historical Provider, MD   traZODone (DESYREL) 100 MG tablet Take 100 mg by mouth nightly    Historical Provider, MD   gabapentin (NEURONTIN) 300 MG capsule Take 300 mg by mouth 2 times daily. Anjelica Vivar Historical Provider, MD   albuterol sulfate  (90 Base) MCG/ACT inhaler Use 2 puffs 4 times daily for 7 days then as needed for wheezing. Dispense with Spacer and instruct in use. At patient's preference may use 60 dose MDI. May Sub Pro-Air or Proventil as needed per insurance. 9/1/18   Constanza Gama,    zafirlukast (ACCOLATE) 20 MG tablet Take 20 mg by mouth 2 times daily. Historical Provider, MD   esomeprazole Magnesium (NEXIUM) 40 MG PACK Take 40 mg by mouth daily. Historical Provider, MD   baclofen (LIORESAL) 10 MG tablet Take 10 mg by mouth 2 times daily     Historical Provider, MD   atorvastatin (LIPITOR) 40 MG tablet Take 20 mg by mouth daily     Historical Provider, MD   fluticasone-salmeterol (ADVAIR) 250-50 MCG/DOSE AEPB Inhale 1 puff into the lungs every 12 hours. Historical Provider, MD       Allergies:  Patient has no known allergies. Social History:      The patient currently lives at home    TOBACCO:   reports that she has been smoking cigarettes. She has been smoking about 0.50 packs per day. She has never used smokeless tobacco.  ETOH:   reports no history of alcohol use. E-Cigarettes/Vaping Use     Questions Responses    E-Cigarette/Vaping Use     Start Date     Passive Exposure     Quit Date     Counseling Given     Comments             Family History:      Reviewed in detail and negative for  ESRD. Positive as follows:    History reviewed. No pertinent family history. REVIEW OF SYSTEMS COMPLETED:   Pertinent positives as noted in the HPI. All other systems reviewed and negative.     PHYSICAL EXAM PERFORMED:    /70   Pulse 79   Temp 98.2 °F (36.8 °C) (Oral)   Resp 20   Ht 5' 6\" (1.676 m)   Wt 120 lb (54.4 kg) SpO2 97%   BMI 19.37 kg/m²     General appearance:  No apparent distress, appears stated age and cooperative. HEENT:  Normal cephalic, atraumatic without obvious deformity. Pupils equal, round, and reactive to light. Extra ocular muscles intact. Conjunctivae/corneas clear. Neck: Supple, with full range of motion. No jugular venous distention. Trachea midline. Respiratory:  Slightly increased respiratory effort. Bilaterally without Rales. Prominent bilateral wheezing. Cardiovascular:  Regular rate and rhythm with normal S1/S2 without murmurs, rubs or gallops. Abdomen: Soft, non-tender, non-distended with normal bowel sounds. Musculoskeletal:  No clubbing, cyanosis or edema bilaterally. Full range of motion without deformity. Skin: Skin color, texture, turgor normal.  No rashes or lesions. Neurologic:  Neurovascularly intact without any focal sensory/motor deficits. Cranial nerves: II-XII intact, grossly non-focal.  Psychiatric:  Alert and oriented, thought content appropriate, normal insight. Not particularly anxious. Seems stoic. Capillary Refill: Brisk,3 seconds, normal  Peripheral Pulses: +2 palpable, equal bilaterally       Labs:     Recent Labs     09/10/21  1223   WBC 10.3   HGB 13.1   HCT 39.0        Recent Labs     09/10/21  1223      K 4.7      CO2 26   BUN 17   CREATININE 1.0   CALCIUM 9.7     Recent Labs     09/10/21  1223   AST 14*   ALT 8*   BILITOT 0.3   ALKPHOS 105     No results for input(s): INR in the last 72 hours.   Recent Labs     09/10/21  1223   TROPONINI <0.01       Urinalysis:      Lab Results   Component Value Date    NITRU Negative 07/06/2020    WBCUA 0-2 07/10/2019    RBCUA None seen 07/10/2019    BLOODU Negative 07/06/2020    SPECGRAV 1.015 07/06/2020    GLUCOSEU Negative 07/06/2020       Radiology:     CXR: I have reviewed the CXR with the following interpretation: clear lungs  EKG:  I have reviewed the EKG with the following interpretation: nonspecific abnormalities    CT ABDOMEN PELVIS WO CONTRAST Additional Contrast? None   Final Result   Chest:      No evidence of pulmonary embolism. Severe emphysema. Nodule like focus in the inferior tip of the lingula, focal nodule like   atelectasis versus true nodule. Recommend follow-up chest CT in 3 months or   PET-CT at this time. Bronchitis. Abdomen/pelvis:      Negative CT examination of abdomen/pelvis as discussed. CT CHEST PULMONARY EMBOLISM W CONTRAST   Final Result   Chest:      No evidence of pulmonary embolism. Severe emphysema. Nodule like focus in the inferior tip of the lingula, focal nodule like   atelectasis versus true nodule. Recommend follow-up chest CT in 3 months or   PET-CT at this time. Bronchitis. Abdomen/pelvis:      Negative CT examination of abdomen/pelvis as discussed. XR CHEST PORTABLE   Final Result   No acute cardiopulmonary disease. ASSESSMENT:    Active Hospital Problems    Diagnosis Date Noted    COPD exacerbation ([facility] [address] [J44.1] 09/10/2021    Chronic obstructive pulmonary disease ([facility] [J44.9]          PLAN:      AECOPD. Steroids, inhaled bronchodilators. COVID negative, no infiltrates. F/u procalcitonin. She quit smoking a year ago. Will refer to pulmonology as outpatient. If she doesn't quickly improve here will consider pulmonary consult for possible bronchoscopy. Acute hypoxic respiratory failure. Due to above, treat accordingly. Wean to RA as tolerated. The patient has no supplemental O2 requirement at baseline and was 94% on RA in 7/2021 during PCP visit. Incidental finding of 1.1 cm lingular pulmonary nodule. She will f/u with pulmonary clinic for this to decide between eventual repeat CT or going straight to a PET-CT. Discussed significance with patient. Fibromyalgia. Supportive care.         DVT Prophylaxis: enoxaparin  Diet: No diet orders on file  Code Status: Prior    PT/OT Eval Status: not indicated    Dispo - perhaps 9/11 - 9/13, ideally when she is weaned to [address] [place] [address]. Krish Puente MD    Thank you Robert Dean for the opportunity to be involved in this patient's care. If you have any questions or concerns please feel free to contact me at [phone].

## 2021-09-10 NOTE — ED PROVIDER NOTES
Not on file   Social History Narrative    Not on file     Social Determinants of Health     Financial Resource Strain:     Difficulty of Paying Living Expenses:    Food Insecurity:     Worried About Running Out of Food in the Last Year:     [address] in the Last Year:    Transportation Needs:     Lack of Transportation (Medical):  Lack of Transportation (Non-Medical):    Physical Activity:     Days of Exercise per Week:     Minutes of Exercise per Session:    Stress:     Feeling of Stress :    Social Connections:     Frequency of Communication with Friends and Family:     Frequency of Social Gatherings with Friends and Family:     Attends Voodoo Services:     Active Member of Clubs or Organizations:     Attends Club or Organization Meetings:     Marital Status:    Intimate Partner Violence:     Fear of Current or Ex-Partner:     Emotionally Abused:     Physically Abused:     Sexually Abused:      Current Facility-Administered Medications   Medication Dose Route Frequency Provider Last Rate Last Admin    methylPREDNISolone sodium (SOLU-MEDROL) injection 125 mg  125 mg IntraVENous Q6H Corky Cocker, DO   125 mg at 09/10/21 1340    0.9 % sodium chloride bolus  1,000 mL IntraVENous Once Corky Cocker, DO 1,000 mL/hr at 09/10/21 1358 1,000 mL at 09/10/21 1358     Current Outpatient Medications   Medication Sig Dispense Refill    diclofenac sodium (VOLTAREN) 1 % GEL Apply 2 g topically 4 times daily 2 Tube 3    methylPREDNISolone (MEDROL, CORRINE,) 4 MG tablet Take by mouth. 1 kit 0    ferrous sulfate (IRON 325) 325 (65 Fe) MG tablet Take 1 tablet by mouth 2 times daily 30 tablet 0    PARoxetine (PAXIL) 40 MG tablet Take 40 mg by mouth every morning      traZODone (DESYREL) 100 MG tablet Take 100 mg by mouth nightly      gabapentin (NEURONTIN) 300 MG capsule Take 300 mg by mouth 2 times daily. .      albuterol sulfate  (90 Base) MCG/ACT inhaler Use 2 puffs 4 times daily for 7 days then as needed for wheezing. Dispense with Spacer and instruct in use. At patient's preference may use 60 dose MDI. May Sub Pro-Air or Proventil as needed per insurance. 1 Inhaler 0    zafirlukast (ACCOLATE) 20 MG tablet Take 20 mg by mouth 2 times daily.  esomeprazole Magnesium (NEXIUM) 40 MG PACK Take 40 mg by mouth daily.  baclofen (LIORESAL) 10 MG tablet Take 10 mg by mouth 2 times daily       atorvastatin (LIPITOR) 40 MG tablet Take 20 mg by mouth daily       fluticasone-salmeterol (ADVAIR) 250-50 MCG/DOSE AEPB Inhale 1 puff into the lungs every 12 hours. No Known Allergies    REVIEW OF SYSTEMS  10 systems reviewed, pertinent positives per HPI otherwise noted to be negative. PHYSICAL EXAM  BP 97/73   Pulse 87   Temp 98.2 °F (36.8 °C) (Oral)   Resp 17   Ht 5' 6\" (1.676 m)   Wt 120 lb (54.4 kg)   SpO2 (!) 86%   BMI 19.37 kg/m²   GENERAL APPEARANCE: Awake and alert. Cooperative. No acute distress. HEAD: Normocephalic. Atraumatic. EYES: PERRL. EOM's grossly intact. ENT: Mucous membranes are moist.   NECK: Supple, trachea midline. HEART: RRR. Normal S1, S2. No murmurs, rubs or gallops. LUNGS: Increased respiratory effort with diffuse rhonchi/wheezing. ABDOMEN: Soft. Non-distended. Non-tender. No guarding or rebound. Normal Bowel sounds. EXTREMITIES: No peripheral edema. MAEE. No acute deformities. SKIN: Warm and dry. No acute rashes. NEUROLOGICAL: Alert and oriented X 3. CN II-XII intact. No gross facial drooping. Strength 5/5, sensation intact. No pronator drift. Normal coordination. PSYCHIATRIC: Normal mood and affect. LABS  I have reviewed all labs for this visit.    Results for orders placed or performed during the hospital encounter of 09/10/21   COVID-19, Rapid    Specimen: Nasopharyngeal Swab   Result Value Ref Range    SARS-CoV-2, NAAT Not Detected Not Detected   CBC auto differential   Result Value Ref Range    WBC 10.3 4.0 - 11.0 K/uL    RBC 4.58 4.00 - 5.20 M/uL    Hemoglobin 13.1 12.0 - 16.0 g/dL    Hematocrit 39.0 36.0 - 48.0 %    MCV 85.2 80.0 - 100.0 fL    MCH 28.6 26.0 - 34.0 pg    MCHC 33.6 31.0 - 36.0 g/dL    RDW 14.2 12.4 - 15.4 %    Platelets 807 042 - 431 K/uL    MPV 6.7 5.0 - 10.5 fL    Neutrophils % 77.1 %    Lymphocytes % 10.4 %    Monocytes % 8.9 %    Eosinophils % 2.7 %    Basophils % 0.9 %    Neutrophils Absolute 7.9 (H) 1.7 - 7.7 K/uL    Lymphocytes Absolute 1.1 1.0 - 5.1 K/uL    Monocytes Absolute 0.9 0.0 - 1.3 K/uL    Eosinophils Absolute 0.3 0.0 - 0.6 K/uL    Basophils Absolute 0.1 0.0 - 0.2 K/uL   Comprehensive Metabolic Panel w/ Reflex to MG   Result Value Ref Range    Sodium 139 136 - 145 mmol/L    Potassium reflex Magnesium 4.7 3.5 - 5.1 mmol/L    Chloride 102 99 - 110 mmol/L    CO2 26 21 - 32 mmol/L    Anion Gap 11 3 - 16    Glucose 120 (H) 70 - 99 mg/dL    BUN 17 7 - 20 mg/dL    CREATININE 1.0 0.6 - 1.2 mg/dL    GFR Non- 56 (A) >60    GFR African American >60 >60    Calcium 9.7 8.3 - 10.6 mg/dL    Total Protein 7.1 6.4 - 8.2 g/dL    Albumin 4.5 3.4 - 5.0 g/dL    Albumin/Globulin Ratio 1.7 1.1 - 2.2    Total Bilirubin 0.3 0.0 - 1.0 mg/dL    Alkaline Phosphatase 105 40 - 129 U/L    ALT 8 (L) 10 - 40 U/L    AST 14 (L) 15 - 37 U/L    Globulin 2.6 g/dL   Troponin   Result Value Ref Range    Troponin <0.01 <0.01 ng/mL   EKG 12 Lead   Result Value Ref Range    Ventricular Rate 82 BPM    Atrial Rate 82 BPM    P-R Interval 134 ms    QRS Duration 96 ms    Q-T Interval 376 ms    QTc Calculation (Bazett) 439 ms    P Axis 87 degrees    R Axis 83 degrees    T Axis 84 degrees    Diagnosis       Normal sinus rhythmNormal ECGWhen compared with ECG of 05-JUL-2020 15:20,No significant change was found       EKG  The Ekg interpreted by myself  normal sinus rhythm with a rate of 82  Axis is   Normal  QTc is  normal  Intervals and Durations are unremarkable. No specific ST-T wave changes appreciated. No evidence of acute ischemia.    No significant change from prior EKG dated 7/5/2020    Cardiac Monitoring: No ectopy. Normal rate. RADIOLOGY  X-RAYS:  I have reviewed radiologic plain film image(s). ALL OTHER NON-PLAIN FILM IMAGES SUCH AS CT, ULTRASOUND AND MRI HAVE BEEN READ BY THE RADIOLOGIST. CT ABDOMEN PELVIS WO CONTRAST Additional Contrast? None   Final Result   Chest:      No evidence of pulmonary embolism. Severe emphysema. Nodule like focus in the inferior tip of the lingula, focal nodule like   atelectasis versus true nodule. Recommend follow-up chest CT in 3 months or   PET-CT at this time. Bronchitis. Abdomen/pelvis:      Negative CT examination of abdomen/pelvis as discussed. CT CHEST PULMONARY EMBOLISM W CONTRAST   Final Result   Chest:      No evidence of pulmonary embolism. Severe emphysema. Nodule like focus in the inferior tip of the lingula, focal nodule like   atelectasis versus true nodule. Recommend follow-up chest CT in 3 months or   PET-CT at this time. Bronchitis. Abdomen/pelvis:      Negative CT examination of abdomen/pelvis as discussed. XR CHEST PORTABLE   Final Result   No acute cardiopulmonary disease. Rechecks: Physical assessment performed. Patient resting comfortably on supplemental oxygen. 2030: O2 sats 93% on supplemental oxygen. Critical Care: Critical care 35 minutes was completed on patient in addition to and excluding procedures noted secondary to concerns for respiratory failure and potential respiratory decompensation. ED COURSE/MDM  Patient seen and evaluated. Old records reviewed. Labs and imaging reviewed and results discussed with patient. Patient was given normal saline, Zofran, morphine, albuterol and Solu-Medrol in the ED with good symptomatic relief. Patient was reassessed as noted above. Patient remained stable on supplemental oxygen throughout her stay in the emergency department.  She will be admitted to hospitalist after further evaluation and treatment. . Plan of care discussed with patient and family. Patient and family in agreement with plan. Patient was given scripts for the following medications. I counseled patient how to take these medications. Current Discharge Medication List          CLINICAL IMPRESSION  1. Acute respiratory failure with hypoxia ([address]    2. COPD exacerbation (HCC)    3. Pulmonary nodule    4. Generalized abdominal pain        Blood pressure (!) 96/56, pulse 75, temperature 98.4 °F (36.9 °C), temperature source Oral, resp. rate 16, height 5' 6\" (1.676 m), weight 120 lb (54.4 kg), SpO2 92 %. DISPOSITION  Trudi Owens was admitted in stable condition.         Danya Andrade DO  09/10/21 2125

## 2021-09-10 NOTE — PROGRESS NOTES
4 Eyes Skin Assessment      The patient is being assess for   Admission    I agree that 2 RN's have performed a thorough Head to Toe Skin Assessment on the patient. ALL assessment sites listed below have been assessed. Areas assessed for pressure by both nurses:   [x]   Head, Face, and Ears   [x]   Shoulders, Back, and Chest, Abdomen  [x]   Arms, Elbows, and Hands   [x]   Coccyx, Sacrum, and Ischium  [x]   Legs, Feet, and Heels        Skin Assessed Under all Medical Devices by both nurses:  N/A              All Mepilex Borders were peeled back and area peeked at by both nurses:  No: N/A  Please list where Mepilex Borders are located:  N/A             **SHARE this note so that the co-signing nurse is able to place an eSignature**    Co-signer eSignature: Electronically signed by Robi Urban RN on 9/10/21 at 7:14 PM EDT    Does the Patient have Skin Breakdown related to pressure?   No     (Insert Photo hereN/A)         Yadiel Prevention initiated:  NA   Wound Care Orders initiated:  NA      WOC nurse consulted for Pressure Injury (Stage 3,4, Unstageable, DTI, NWPT, Complex wounds)and New or Established Ostomies:  NA      Primary Nurse eSignature: Electronically signed by Amy Aleman RN on 9/10/21 at 7:12 PM EDT

## 2021-09-11 VITALS
WEIGHT: 120 LBS | DIASTOLIC BLOOD PRESSURE: 67 MMHG | HEART RATE: 65 BPM | TEMPERATURE: 97.7 F | BODY MASS INDEX: 19.29 KG/M2 | SYSTOLIC BLOOD PRESSURE: 103 MMHG | OXYGEN SATURATION: 90 % | HEIGHT: 66 IN | RESPIRATION RATE: 18 BRPM

## 2021-09-11 PROCEDURE — 96372 THER/PROPH/DIAG INJ SC/IM: CPT

## 2021-09-11 PROCEDURE — G0378 HOSPITAL OBSERVATION PER HR: HCPCS

## 2021-09-11 PROCEDURE — 6370000000 HC RX 637 (ALT 250 FOR IP): Performed by: INTERNAL MEDICINE

## 2021-09-11 PROCEDURE — 2580000003 HC RX 258: Performed by: INTERNAL MEDICINE

## 2021-09-11 PROCEDURE — 6360000002 HC RX W HCPCS: Performed by: INTERNAL MEDICINE

## 2021-09-11 PROCEDURE — 94640 AIRWAY INHALATION TREATMENT: CPT

## 2021-09-11 RX ORDER — GUAIFENESIN 600 MG/1
600 TABLET, EXTENDED RELEASE ORAL 2 TIMES DAILY
Qty: 30 TABLET | Refills: 0 | Status: SHIPPED | OUTPATIENT
Start: 2021-09-11

## 2021-09-11 RX ORDER — ALBUTEROL SULFATE 2.5 MG/.5ML
2.5 SOLUTION RESPIRATORY (INHALATION) EVERY 6 HOURS PRN
Qty: 120 EACH | Refills: 0 | Status: SHIPPED | OUTPATIENT
Start: 2021-09-11

## 2021-09-11 RX ORDER — PREDNISONE 20 MG/1
40 TABLET ORAL DAILY
Qty: 10 TABLET | Refills: 0 | Status: SHIPPED | OUTPATIENT
Start: 2021-09-11 | End: 2021-09-16

## 2021-09-11 RX ADMIN — ZAFIRLUKAST 20 MG: 20 TABLET, FILM COATED ORAL at 03:27

## 2021-09-11 RX ADMIN — ENOXAPARIN SODIUM 40 MG: 40 INJECTION SUBCUTANEOUS at 07:58

## 2021-09-11 RX ADMIN — GUAIFENESIN 600 MG: 600 TABLET, EXTENDED RELEASE ORAL at 07:59

## 2021-09-11 RX ADMIN — FERROUS SULFATE TAB 325 MG (65 MG ELEMENTAL FE) 325 MG: 325 (65 FE) TAB at 07:59

## 2021-09-11 RX ADMIN — SODIUM CHLORIDE, PRESERVATIVE FREE 10 ML: 5 INJECTION INTRAVENOUS at 08:00

## 2021-09-11 RX ADMIN — IPRATROPIUM BROMIDE AND ALBUTEROL SULFATE 1 AMPULE: .5; 3 SOLUTION RESPIRATORY (INHALATION) at 11:30

## 2021-09-11 RX ADMIN — GUAIFENESIN 600 MG: 600 TABLET, EXTENDED RELEASE ORAL at 03:26

## 2021-09-11 RX ADMIN — PANTOPRAZOLE SODIUM 40 MG: 40 TABLET, DELAYED RELEASE ORAL at 07:08

## 2021-09-11 RX ADMIN — IPRATROPIUM BROMIDE AND ALBUTEROL SULFATE 1 AMPULE: .5; 3 SOLUTION RESPIRATORY (INHALATION) at 07:35

## 2021-09-11 RX ADMIN — SODIUM CHLORIDE, PRESERVATIVE FREE 10 ML: 5 INJECTION INTRAVENOUS at 03:26

## 2021-09-11 RX ADMIN — GABAPENTIN 300 MG: 300 CAPSULE ORAL at 07:58

## 2021-09-11 RX ADMIN — PAROXETINE HYDROCHLORIDE 40 MG: 20 TABLET, FILM COATED ORAL at 08:00

## 2021-09-11 RX ADMIN — ATORVASTATIN CALCIUM 20 MG: 10 TABLET, FILM COATED ORAL at 07:59

## 2021-09-11 RX ADMIN — BACLOFEN 10 MG: 10 TABLET ORAL at 07:59

## 2021-09-11 ASSESSMENT — PAIN SCALES - GENERAL
PAINLEVEL_OUTOF10: 0
PAINLEVEL_OUTOF10: 0

## 2021-09-11 NOTE — PROGRESS NOTES
09/11/21 0740   RT Protocol   Smoking Status 2   Surgical status 0   Xray 0   Respiratory pattern 0   Mental Status 0   Breath sounds 2   Cough 0   Activity level 0   Oxygen Requirement 0   Indications for Bronchodilator Therapy On home bronchodilators   Bronchodilator Assessment Score 4   Indications for Bronchial Hygiene None   Bronchial Hygiene Assessment Score 4   Indications for Volume Expansion None   Volume Expansion Assessment Score 2

## 2021-09-11 NOTE — DISCHARGE SUMMARY
Hospital Medicine Discharge Summary    Patient ID: Garrett Goss      Patient's PCP: Mariama Tavares Date: 9/10/2021     Discharge Date:   09/11/21     Admitting Physician: Tino Russell MD     Discharge Physician: Tl Aguayo MD     Discharge Diagnoses: Active Hospital Problems    Diagnosis     COPD exacerbation ([facility] [J44.1]     Chronic obstructive pulmonary disease ([facility] [J44.9]        The patient was seen and examined on day of discharge and this discharge summary is in conjunction with any daily progress note from day of discharge. Hospital Course: The patient is a pleasant 60 Y F with a h/o former smoking, COPD, and fibromyalgia. She has had two family members die recently (one from [facility] and the other from lung cancer) and she has been grieving. She is also stressed out because she cares for her mother who has alzheimer's. For the last 2-3 weeks she has been more short of breath than normal, but she was just attributing it to stress. Worse with exertion. Her cough is more intense and more frequent than normal.  No fevers or chills. No sick contacts and she is vaccinated for COVID. No chest pain. No edema or orthopnea. She came to the ED and was found to be wheezing badly. CT showed severe emphysema. AECOPD. Steroids, inhaled bronchodilators. COVID negative, no infiltrates. Negative procalcitonin. She quit smoking a year ago. Will refer to pulmonology as outpatient. - the patient was still wheezing badly the morning after admission and I recommended that she stay another night. She and her son were eager to take her home and preferred to discharge today. By then she was weaned to RA and was breathing easily, so this didn't seem like an especially bad decision. I agreed to discharge her with steroids and home nebulizers. F/u soon with pulmonary.       Acute hypoxic respiratory failure. Due to above, treated accordingly. Weaned to RA.   The patient has no supplemental O2 requirement at baseline and was 94% on RA in 7/2021 during PCP visit.     Incidental finding of 1.1 cm lingular pulmonary nodule. She will f/u with pulmonary clinic for this to decide between eventual repeat CT or going straight to a PET-CT. Discussed significance with patient and her son.       Fibromyalgia. Supportive care. Physical Exam Performed:     /67   Pulse 65   Temp 97.7 °F (36.5 °C) (Oral)   Resp 18   Ht 5' 6\" (1.676 m)   Wt 120 lb (54.4 kg)   SpO2 90%   BMI 19.37 kg/m²       General appearance:  No apparent distress, appears stated age and cooperative. HEENT:  Normal cephalic, atraumatic without obvious deformity. Pupils equal, round, and reactive to light. Extra ocular muscles intact. Conjunctivae/corneas clear. Neck: Supple, with full range of motion. No jugular venous distention. Trachea midline. Respiratory:  no longer has increased respiratory effort. Bilaterally without Rales. Prominent bilateral wheezing persists. Cardiovascular:  Regular rate and rhythm with normal S1/S2 without murmurs, rubs or gallops. Abdomen: Soft, non-tender, non-distended with normal bowel sounds. Musculoskeletal:  No clubbing, cyanosis or edema bilaterally. Full range of motion without deformity. Skin: Skin color, texture, turgor normal.  No rashes or lesions. Neurologic:  Neurovascularly intact without any focal sensory/motor deficits. Cranial nerves: II-XII intact, grossly non-focal.  Psychiatric:  Alert and oriented, thought content appropriate, normal insight. Not particularly anxious. Seems stoic. Capillary Refill: Brisk,3 seconds, normal  Peripheral Pulses: +2 palpable, equal bilaterally       Labs:  For convenience and continuity at follow-up the following most recent labs are provided:      CBC:    Lab Results   Component Value Date    WBC 10.3 09/10/2021    HGB 13.1 09/10/2021    HCT 39.0 09/10/2021     09/10/2021       Renal:    Lab Results   Component Value Date     09/10/2021    K 4.7 09/10/2021     09/10/2021    CO2 26 09/10/2021    BUN 17 09/10/2021    CREATININE 1.0 09/10/2021    CALCIUM 9.7 09/10/2021         Significant Diagnostic Studies    Radiology:   CT ABDOMEN PELVIS WO CONTRAST Additional Contrast? None   Final Result   Chest:      No evidence of pulmonary embolism. Severe emphysema. Nodule like focus in the inferior tip of the lingula, focal nodule like   atelectasis versus true nodule. Recommend follow-up chest CT in 3 months or   PET-CT at this time. Bronchitis. Abdomen/pelvis:      Negative CT examination of abdomen/pelvis as discussed. CT CHEST PULMONARY EMBOLISM W CONTRAST   Final Result   Chest:      No evidence of pulmonary embolism. Severe emphysema. Nodule like focus in the inferior tip of the lingula, focal nodule like   atelectasis versus true nodule. Recommend follow-up chest CT in 3 months or   PET-CT at this time. Bronchitis. Abdomen/pelvis:      Negative CT examination of abdomen/pelvis as discussed. XR CHEST PORTABLE   Final Result   No acute cardiopulmonary disease. Consults:     None    Disposition:  home     Condition at Discharge: Stable    Discharge Instructions/Follow-up:  Follow up with PCP within 1-2 weeks. Call the pulmonary clinic and get an appointment scheduled when possible.      Code Status:  Full Code     Activity: activity as tolerated    Diet: regular diet      Discharge Medications:     Current Discharge Medication List           Details   albuterol (PROVENTIL) 2.5 MG/0.5ML NEBU nebulizer solution Take 0.5 mLs by nebulization every 6 hours as needed for Wheezing or Shortness of Breath  Qty: 120 each, Refills: 0      guaiFENesin (MUCINEX) 600 MG extended release tablet Take 1 tablet by mouth 2 times daily  Qty: 30 tablet, Refills: 0      predniSONE (DELTASONE) 20 MG tablet Take 2 tablets by mouth daily for 5 days  Qty: 10 tablet, Refills: 0              Details   diclofenac sodium (VOLTAREN) 1 % GEL Apply 2 g topically 4 times daily  Qty: 2 Tube, Refills: 3    Associated Diagnoses: Closed fracture of distal end of right femur with routine healing, unspecified fracture morphology, subsequent encounter      ferrous sulfate (IRON 325) 325 (65 Fe) MG tablet Take 1 tablet by mouth 2 times daily  Qty: 30 tablet, Refills: 0      PARoxetine (PAXIL) 40 MG tablet Take 40 mg by mouth every morning      traZODone (DESYREL) 100 MG tablet Take 100 mg by mouth nightly      gabapentin (NEURONTIN) 300 MG capsule Take 300 mg by mouth 2 times daily. Christo Rloo albuterol sulfate  (90 Base) MCG/ACT inhaler Use 2 puffs 4 times daily for 7 days then as needed for wheezing. Dispense with Spacer and instruct in use. At patient's preference may use 60 dose MDI. May Sub Pro-Air or Proventil as needed per insurance. Qty: 1 Inhaler, Refills: 0      zafirlukast (ACCOLATE) 20 MG tablet Take 20 mg by mouth 2 times daily. esomeprazole Magnesium (NEXIUM) 40 MG PACK Take 40 mg by mouth daily. baclofen (LIORESAL) 10 MG tablet Take 10 mg by mouth 2 times daily       atorvastatin (LIPITOR) 40 MG tablet Take 20 mg by mouth daily                Time Spent on discharge is more than 30 minutes in the examination, evaluation, counseling and review of medications and discharge plan. Signed:    Lidia Salgado MD   9/11/2021      Thank you Yonis Nicholson for the opportunity to be involved in this patient's care. If you have any questions or concerns please feel free to contact me at [phone].

## 2021-09-11 NOTE — PROGRESS NOTES
DC orders given pt verbalized understanding, pt aware RX located in OP pharmacy to be picked up on way out. IV removed no complications. Pt belongings gathered. Lockbox emptied. Family here to take pt home.

## 2021-09-13 ENCOUNTER — TELEPHONE (OUTPATIENT)
Dept: PULMONOLOGY | Age: 66
End: 2021-09-13

## 2021-09-13 NOTE — TELEPHONE ENCOUNTER
----- Message from Edu Christensen MD sent at 9/12/2021  9:15 PM EDT -----  Needs to be seen in the office for COPD and lung nodule.  Can overbook, not urgent    AN  ----- Message -----  From: Tino Russell MD  Sent: 9/11/2021   2:31 PM EDT  To: Edu Christensen MD

## 2021-09-29 ENCOUNTER — HOSPITAL ENCOUNTER (OUTPATIENT)
Age: 66
Discharge: HOME | End: 2021-09-29
Payer: MEDICARE

## 2021-09-29 ENCOUNTER — OFFICE VISIT (OUTPATIENT)
Dept: PULMONOLOGY | Age: 66
End: 2021-09-29
Payer: MEDICARE

## 2021-09-29 VITALS
OXYGEN SATURATION: 95 % | HEIGHT: 66 IN | WEIGHT: 128.2 LBS | TEMPERATURE: 98.1 F | HEART RATE: 101 BPM | SYSTOLIC BLOOD PRESSURE: 121 MMHG | DIASTOLIC BLOOD PRESSURE: 83 MMHG | RESPIRATION RATE: 16 BRPM | BODY MASS INDEX: 20.6 KG/M2

## 2021-09-29 DIAGNOSIS — J43.1 PANLOBULAR EMPHYSEMA (HCC): ICD-10-CM

## 2021-09-29 DIAGNOSIS — M81.0 OSTEOPOROSIS, UNSPECIFIED OSTEOPOROSIS TYPE, UNSPECIFIED PATHOLOGICAL FRACTURE PRESENCE: ICD-10-CM

## 2021-09-29 DIAGNOSIS — Z87.891 FORMER SMOKER: ICD-10-CM

## 2021-09-29 DIAGNOSIS — J41.0 SIMPLE CHRONIC BRONCHITIS (HCC): Primary | ICD-10-CM

## 2021-09-29 PROCEDURE — 3023F SPIROM DOC REV: CPT | Performed by: INTERNAL MEDICINE

## 2021-09-29 PROCEDURE — 1036F TOBACCO NON-USER: CPT | Performed by: INTERNAL MEDICINE

## 2021-09-29 PROCEDURE — G8926 SPIRO NO PERF OR DOC: HCPCS | Performed by: INTERNAL MEDICINE

## 2021-09-29 PROCEDURE — 82103 ALPHA-1-ANTITRYPSIN TOTAL: CPT

## 2021-09-29 PROCEDURE — 99204 OFFICE O/P NEW MOD 45 MIN: CPT | Performed by: INTERNAL MEDICINE

## 2021-09-29 PROCEDURE — G8420 CALC BMI NORM PARAMETERS: HCPCS | Performed by: INTERNAL MEDICINE

## 2021-09-29 PROCEDURE — G8427 DOCREV CUR MEDS BY ELIG CLIN: HCPCS | Performed by: INTERNAL MEDICINE

## 2021-09-29 PROCEDURE — 1111F DSCHRG MED/CURRENT MED MERGE: CPT | Performed by: INTERNAL MEDICINE

## 2021-09-29 PROCEDURE — 3017F COLORECTAL CA SCREEN DOC REV: CPT | Performed by: INTERNAL MEDICINE

## 2021-09-29 NOTE — PROGRESS NOTES
Pulmonary Outpatient Note   Kang Dalton MD       2021    1. Simple chronic bronchitis ([facility] [facility]    2. Panlobular emphysema ([address]    3. Former smoker    4. Osteoporosis, unspecified osteoporosis type, unspecified pathological fracture presence          ASSESSMENT/PLAN:  Simple chronic bronchitis, panlobular emphysema. The patient is very symptomatic, will obtain PFT and 6-minute walk test.  Due to severe emphysema, will check alpha-1 antitrypsin level. Former smoker. Nonspecific nodules-like appearance at the tip of the lingula. Will need annual low-dose screening CT chest  Osteoporosis. She has been a heavy smoker, has severe emphysema, is underweight and postmenopausal.  Recommend DEXA scan. Prophylaxis. Has received Pneumovax, both doses of her COVID-19 vaccine. Recommend continue with flu shot annually, receive COVID-19 booster when available    RTC with testing      Orders Placed This Encounter   Procedures    DEXA BONE DENSITY AXIAL SKELETON    ALPHA-1-ANTITRYPSIN    Full PFT Study With Bronchodilator    6 Minute Walk Test       No follow-ups on file. Chief Complaint:   Follow-up ([employer] r/b Shea Ast) and COPD       HPI: Valery Pena is a 60y.o. year old female here for evaluation and management of COPD. She was referred from the hospital by Dr. Nia Vargas, her PCP is Dr. Sheba Byrd. Abbey Dumont lives in [place] with her mother (mother-in-law) Dayron Guerrier, who accompanies her to the visit today. She is . She has 2 sons, one was involved in drug use in the past and has lupus. Her other son and her daughter are healthy. Her mother  of leukemia, father from kidney failure at 80.  2 brothers are , 1 from CMML, the other from lung cancer. She has a sister who is healthy. 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 She was a . The patient smoked 0.5 to 0.75 PPD, quit 2 years ago.   She does not drink disease) ([facility]     DDD (degenerative disc disease)     Fibromyalgia     Sciatica        Past Surgical History:   Procedure Laterality Date    CHOLECYSTECTOMY      FEMUR FRACTURE SURGERY Right 2020    RIGHT FEMUR OPEN REDUCTION INTERNAL FIXATION performed by Ruthie Ibrahim DO at [facility]      KNEE SURGERY      OTHER SURGICAL HISTORY      RIGHT FEMUR OPEN REDUCTION INTERNAL FIXATION (Right    TONSILLECTOMY         Social History     Tobacco Use    Smoking status: Former Smoker     Packs/day: 0.50     Types: Cigarettes     Start date: 1974     Quit date: 2020     Years since quittin.7    Smokeless tobacco: Never Used   Substance Use Topics    Alcohol use: No       History reviewed. No pertinent family history.       Current Outpatient Medications:     dicyclomine (BENTYL) 20 MG tablet, Take 20 mg by mouth every 6 hours, Disp: , Rfl:     busPIRone (BUSPAR) 10 MG tablet, Take 10 mg by mouth 3 times daily, Disp: , Rfl:     hydrOXYzine (ATARAX) 50 MG tablet, Take 50 mg by mouth 3 times daily as needed for Itching, Disp: , Rfl:     TRELEGY ELLIPTA 100-62.5-25 MCG/INH AEPB, Inhale 1 puff into the lungs 2 times daily, Disp: , Rfl:     albuterol (PROVENTIL) 2.5 MG/0.5ML NEBU nebulizer solution, Take 0.5 mLs by nebulization every 6 hours as needed for Wheezing or Shortness of Breath, Disp: 120 each, Rfl: 0    guaiFENesin (MUCINEX) 600 MG extended release tablet, Take 1 tablet by mouth 2 times daily, Disp: 30 tablet, Rfl: 0    diclofenac sodium (VOLTAREN) 1 % GEL, Apply 2 g topically 4 times daily, Disp: 2 Tube, Rfl: 3    ferrous sulfate (IRON 325) 325 (65 Fe) MG tablet, Take 1 tablet by mouth 2 times daily, Disp: 30 tablet, Rfl: 0    PARoxetine (PAXIL) 40 MG tablet, Take 40 mg by mouth every morning, Disp: , Rfl:     traZODone (DESYREL) 100 MG tablet, Take 100 mg by mouth nightly, Disp: , Rfl:     gabapentin (NEURONTIN) 300 MG capsule, Take 600 mg by mouth three times daily. , Disp: , Rfl:     albuterol sulfate  (90 Base) MCG/ACT inhaler, Use 2 puffs 4 times daily for 7 days then as needed for wheezing. Dispense with Spacer and instruct in use. At patient's preference may use 60 dose MDI. May Sub Pro-Air or Proventil as needed per insurance., Disp: 1 Inhaler, Rfl: 0    zafirlukast (ACCOLATE) 20 MG tablet, Take 20 mg by mouth 2 times daily. , Disp: , Rfl:     esomeprazole Magnesium (NEXIUM) 40 MG PACK, Take 40 mg by mouth daily. , Disp: , Rfl:     atorvastatin (LIPITOR) 40 MG tablet, Take 20 mg by mouth daily , Disp: , Rfl:     fluticasone (VERAMYST) 27.5 MCG/SPRAY nasal spray, 2 sprays by Each Nostril route daily, Disp: , Rfl:     Patient has no known allergies. Vitals:    09/29/21 1503   BP: 121/83   Site: Left Upper Arm   Position: Sitting   Cuff Size: Medium Adult   Pulse: 101   Resp: 16   Temp: 98.1 °F (36.7 °C)   TempSrc: Oral   SpO2: 95%   Weight: 128 lb 3.2 oz (58.2 kg)   Height: 5' 6\" (1.676 m)       Review of Systems   Constitutional: Positive for appetite change. Negative for chills, diaphoresis, fatigue and fever. HENT: Negative for congestion, nosebleeds, postnasal drip, rhinorrhea, sinus pressure, sneezing, sore throat, trouble swallowing and voice change. Eyes: Negative for discharge, redness, itching and visual disturbance. Respiratory: Positive for cough, chest tightness, shortness of breath and wheezing. Negative for apnea, choking and stridor. Cardiovascular: Negative for chest pain, palpitations and leg swelling. Gastrointestinal: Positive for diarrhea. Negative for abdominal distention, abdominal pain, blood in stool, constipation, nausea and vomiting. Endocrine: Negative for polyuria. Genitourinary: Negative for decreased urine volume, difficulty urinating, dysuria, enuresis, frequency, hematuria and urgency. Musculoskeletal: Positive for arthralgias.  Negative for back pain, gait problem, joint swelling and myalgias. Skin: Negative for rash. Allergic/Immunologic: Negative for environmental allergies and immunocompromised state. Neurological: Negative for dizziness, tremors, seizures, weakness, light-headedness and headaches. Hematological: Does not bruise/bleed easily. Psychiatric/Behavioral: Positive for sleep disturbance. Negative for agitation, behavioral problems, confusion and hallucinations. All other systems reviewed and are negative. Physical Exam  Vitals reviewed. Constitutional:       General: She is not in acute distress. Appearance: She is well-developed. She is not ill-appearing, toxic-appearing or diaphoretic. Comments: Thin built, pleasant   HENT:      Head: Normocephalic and atraumatic. Nose: Nose normal. No congestion or rhinorrhea. Mouth/Throat:      Mouth: Mucous membranes are moist.      Pharynx: Oropharynx is clear. No oropharyngeal exudate or posterior oropharyngeal erythema. Comments: Poorly fitting upper denture, loose lower dentures that she does not wear. Long tongue, class II airway  Eyes:      General: No scleral icterus. Right eye: No discharge. Left eye: No discharge. Conjunctiva/sclera: Conjunctivae normal.      Pupils: Pupils are equal, round, and reactive to light. Comments: Glasses   Neck:      Thyroid: No thyromegaly. Vascular: No JVD. Trachea: No tracheal deviation. Cardiovascular:      Rate and Rhythm: Normal rate and regular rhythm. Heart sounds: Normal heart sounds. No murmur heard. No friction rub. No gallop. Pulmonary:      Effort: Pulmonary effort is normal. No respiratory distress. Breath sounds: Normal breath sounds. No stridor. No wheezing, rhonchi or rales. Abdominal:      Palpations: Abdomen is soft. Tenderness: There is no abdominal tenderness. There is no guarding or rebound. Musculoskeletal:      Right lower leg: No edema. Left lower leg: No edema.

## 2021-09-29 NOTE — PROGRESS NOTES
MA Communication:   The following orders are received by verbal communication from Elton Guido MD    Orders include:    PFT/6MW: 10/26/2021 12:30pm  Follow up: 10/26/2021 2:00 pm  Patient to call to schedule her dexascan  Blood work done today

## 2021-10-04 ASSESSMENT — ENCOUNTER SYMPTOMS
TROUBLE SWALLOWING: 0
BLOOD IN STOOL: 0
NAUSEA: 0
RHINORRHEA: 0
SORE THROAT: 0
WHEEZING: 1
EYE REDNESS: 0
APNEA: 0
CHEST TIGHTNESS: 1
STRIDOR: 0
SHORTNESS OF BREATH: 1
SINUS PRESSURE: 0
VOMITING: 0
EYE DISCHARGE: 0
EYE ITCHING: 0
CHOKING: 0
ABDOMINAL PAIN: 0
DIARRHEA: 1
ABDOMINAL DISTENTION: 0
BACK PAIN: 0
VOICE CHANGE: 0
CONSTIPATION: 0
COUGH: 1

## 2021-10-05 LAB — ALPHA-1 ANTITRYPSIN: 102 MG/DL (ref 90–200)

## 2021-11-03 ENCOUNTER — HOSPITAL ENCOUNTER (OUTPATIENT)
Dept: PULMONOLOGY | Age: 66
Discharge: HOME | End: 2021-11-03
Payer: MEDICARE

## 2021-11-03 VITALS — OXYGEN SATURATION: 96 %

## 2021-11-03 DIAGNOSIS — Z87.891 FORMER SMOKER: ICD-10-CM

## 2021-11-03 DIAGNOSIS — J43.1 PANLOBULAR EMPHYSEMA (HCC): ICD-10-CM

## 2021-11-03 DIAGNOSIS — J41.0 SIMPLE CHRONIC BRONCHITIS (HCC): ICD-10-CM

## 2021-11-03 LAB
DLCO %PRED: 74 %
DLCO PRED: NORMAL
DLCO/VA %PRED: NORMAL
DLCO/VA PRED: NORMAL
DLCO/VA: NORMAL
DLCO: NORMAL
EXPIRATORY TIME-POST: NORMAL
EXPIRATORY TIME: NORMAL
FEF 25-75% %CHNG: NORMAL
FEF 25-75% %PRED-POST: NORMAL
FEF 25-75% %PRED-PRE: NORMAL
FEF 25-75% PRED: NORMAL
FEF 25-75%-POST: NORMAL
FEF 25-75%-PRE: NORMAL
FEV1 %PRED-POST: 78 %
FEV1 %PRED-PRE: 71 %
FEV1 PRED: NORMAL
FEV1-POST: NORMAL
FEV1-PRE: NORMAL
FEV1/FVC %PRED-POST: NORMAL
FEV1/FVC %PRED-PRE: NORMAL
FEV1/FVC PRED: NORMAL
FEV1/FVC-POST: 52 %
FEV1/FVC-PRE: 53 %
FVC %PRED-POST: 114 L
FVC %PRED-PRE: 103 %
FVC PRED: NORMAL
FVC-POST: NORMAL
FVC-PRE: NORMAL
GAW %PRED: NORMAL
GAW PRED: NORMAL
GAW: NORMAL
IC %PRED: NORMAL
IC PRED: NORMAL
IC: NORMAL
MEP: NORMAL
MIP: NORMAL
MVV %PRED-PRE: NORMAL
MVV PRED: NORMAL
MVV-PRE: NORMAL
PEF %PRED-POST: NORMAL
PEF %PRED-PRE: NORMAL
PEF PRED: NORMAL
PEF%CHNG: NORMAL
PEF-POST: NORMAL
PEF-PRE: NORMAL
RAW %PRED: NORMAL
RAW PRED: NORMAL
RAW: NORMAL
RV %PRED: NORMAL
RV PRED: NORMAL
RV: NORMAL
SVC %PRED: NORMAL
SVC PRED: NORMAL
SVC: NORMAL
TLC %PRED: 125 %
TLC PRED: NORMAL
TLC: NORMAL
VA %PRED: NORMAL
VA PRED: NORMAL
VA: NORMAL
VTG %PRED: NORMAL
VTG PRED: NORMAL
VTG: NORMAL

## 2021-11-03 PROCEDURE — 6370000000 HC RX 637 (ALT 250 FOR IP): Performed by: INTERNAL MEDICINE

## 2021-11-03 PROCEDURE — 94729 DIFFUSING CAPACITY: CPT

## 2021-11-03 PROCEDURE — 94618 PULMONARY STRESS TESTING: CPT

## 2021-11-03 PROCEDURE — 94726 PLETHYSMOGRAPHY LUNG VOLUMES: CPT

## 2021-11-03 PROCEDURE — 94060 EVALUATION OF WHEEZING: CPT

## 2021-11-03 PROCEDURE — 94618 PULMONARY STRESS TESTING: CPT | Performed by: INTERNAL MEDICINE

## 2021-11-03 RX ORDER — ALBUTEROL SULFATE 90 UG/1
4 AEROSOL, METERED RESPIRATORY (INHALATION) ONCE
Status: COMPLETED | OUTPATIENT
Start: 2021-11-03 | End: 2021-11-03

## 2021-11-03 RX ADMIN — Medication 4 PUFF: at 10:49

## 2021-11-03 ASSESSMENT — PULMONARY FUNCTION TESTS
FEV1/FVC_PRE: 53
FVC_PERCENT_PREDICTED_POST: 114
FVC_PERCENT_PREDICTED_PRE: 103
FEV1_PERCENT_PREDICTED_POST: 78
FEV1_PERCENT_PREDICTED_PRE: 71
FEV1/FVC_POST: 52

## 2021-11-04 NOTE — PROCEDURES
[address] [address]                               PULMONARY FUNCTION    PATIENT NAME: Vicki Chacko                     :        1955  MED REC NO:   [id number]                          ROOM:  ACCOUNT NO:   [de-identified]                           ADMIT DATE: 2021  PROVIDER:     Edna Nathan MD    DATE OF PROCEDURE:  2021    SIX-MINUTE WALK TEST  Initially, the patient was 96% on room air, heart rate of 87 and  respiratory rate of 18. Dyspnea and fatigue modified Amaury scores of  _____ respectively. The patient completed a six-minute walk test with  an O2 sat not going below 97%, heart rate did go up to a max of 125. Amaury dyspnea and fatigue scores were 1 and 1 at the end of the six  minutes. The patient recovered without need of oxygen. The patient was  able to traverse 1709 feet and this is expected and the patient was able  to do 1440 feet. Blood pressure pre was 121/85 and post 135/97.         Geronimo Dyer MD    D: 2021 8:57:52       T: 2021 10:39:25     [email or web address]  Job#: [id number]     Doc#: [id number]    CC:

## 2021-11-18 ENCOUNTER — OFFICE VISIT (OUTPATIENT)
Dept: PULMONOLOGY | Age: 66
End: 2021-11-18
Payer: MEDICARE

## 2021-11-18 VITALS
DIASTOLIC BLOOD PRESSURE: 92 MMHG | HEIGHT: 66 IN | TEMPERATURE: 97.8 F | WEIGHT: 127.8 LBS | BODY MASS INDEX: 20.54 KG/M2 | RESPIRATION RATE: 18 BRPM | OXYGEN SATURATION: 97 % | HEART RATE: 106 BPM | SYSTOLIC BLOOD PRESSURE: 133 MMHG

## 2021-11-18 DIAGNOSIS — J43.1 PANLOBULAR EMPHYSEMA (HCC): ICD-10-CM

## 2021-11-18 DIAGNOSIS — J41.0 SIMPLE CHRONIC BRONCHITIS (HCC): Primary | ICD-10-CM

## 2021-11-18 DIAGNOSIS — R03.0 ELEVATED BLOOD PRESSURE READING: ICD-10-CM

## 2021-11-18 DIAGNOSIS — R06.02 SOB (SHORTNESS OF BREATH): ICD-10-CM

## 2021-11-18 DIAGNOSIS — Z87.891 FORMER SMOKER: ICD-10-CM

## 2021-11-18 DIAGNOSIS — K21.9 GASTROESOPHAGEAL REFLUX DISEASE, UNSPECIFIED WHETHER ESOPHAGITIS PRESENT: ICD-10-CM

## 2021-11-18 PROCEDURE — G8427 DOCREV CUR MEDS BY ELIG CLIN: HCPCS | Performed by: INTERNAL MEDICINE

## 2021-11-18 PROCEDURE — 3017F COLORECTAL CA SCREEN DOC REV: CPT | Performed by: INTERNAL MEDICINE

## 2021-11-18 PROCEDURE — 1036F TOBACCO NON-USER: CPT | Performed by: INTERNAL MEDICINE

## 2021-11-18 PROCEDURE — 3023F SPIROM DOC REV: CPT | Performed by: INTERNAL MEDICINE

## 2021-11-18 PROCEDURE — 99214 OFFICE O/P EST MOD 30 MIN: CPT | Performed by: INTERNAL MEDICINE

## 2021-11-18 PROCEDURE — G8484 FLU IMMUNIZE NO ADMIN: HCPCS | Performed by: INTERNAL MEDICINE

## 2021-11-18 PROCEDURE — G8420 CALC BMI NORM PARAMETERS: HCPCS | Performed by: INTERNAL MEDICINE

## 2021-11-18 PROCEDURE — G8926 SPIRO NO PERF OR DOC: HCPCS | Performed by: INTERNAL MEDICINE

## 2021-11-18 ASSESSMENT — SLEEP AND FATIGUE QUESTIONNAIRES
ESS TOTAL SCORE: 4
HOW LIKELY ARE YOU TO NOD OFF OR FALL ASLEEP IN A CAR, WHILE STOPPED FOR A FEW MINUTES IN TRAFFIC: 0
HOW LIKELY ARE YOU TO NOD OFF OR FALL ASLEEP WHILE SITTING QUIETLY AFTER LUNCH WITHOUT ALCOHOL: 0
HOW LIKELY ARE YOU TO NOD OFF OR FALL ASLEEP WHILE WATCHING TV: 2
NECK CIRCUMFERENCE (INCHES): 13.25
HOW LIKELY ARE YOU TO NOD OFF OR FALL ASLEEP WHILE LYING DOWN TO REST IN THE AFTERNOON WHEN CIRCUMSTANCES PERMIT: 2
HOW LIKELY ARE YOU TO NOD OFF OR FALL ASLEEP WHILE SITTING AND READING: 0
HOW LIKELY ARE YOU TO NOD OFF OR FALL ASLEEP WHILE SITTING INACTIVE IN A PUBLIC PLACE: 0
HOW LIKELY ARE YOU TO NOD OFF OR FALL ASLEEP WHEN YOU ARE A PASSENGER IN A CAR FOR AN HOUR WITHOUT A BREAK: 0
HOW LIKELY ARE YOU TO NOD OFF OR FALL ASLEEP WHILE SITTING AND TALKING TO SOMEONE: 0

## 2021-11-18 NOTE — PATIENT INSTRUCTIONS
Remember to bring a list of pulmonary medications and any CPAP or BiPAP machines to your next appointment with the office. Please keep all of your future appointments scheduled by [facility] office. Out of respect for other patients and providers, you may be asked to reschedule your appointment if you arrive later than your scheduled appointment time. Appointments cancelled less than 24hrs in advance will be considered a no show. Patients with three missed appointments within 1 year or four missed appointments within 2 years can be dismissed from the practice. Please be aware that our physicians are required to work in the Intensive Care Unit at [facility].  Your appointment may need to be rescheduled if they are designated to work during your appointment time. You may receive a survey regarding the care you received during your visit. Your input is valuable to us. We encourage you to complete and return your survey. We hope you will choose us in the future for your healthcare needs. Pt instructed of all future appointment dates & times, including radiology, labs, procedures & referrals. If procedures were scheduled preparation instructions provided. Instructions on future appointments with [facility] were given.

## 2021-11-19 PROBLEM — J43.1 PANLOBULAR EMPHYSEMA (HCC): Status: ACTIVE | Noted: 2021-11-19

## 2021-11-19 PROBLEM — R03.0 ELEVATED BLOOD PRESSURE READING: Status: ACTIVE | Noted: 2021-11-19

## 2021-11-19 PROBLEM — K21.9 GERD (GASTROESOPHAGEAL REFLUX DISEASE): Status: ACTIVE | Noted: 2021-11-19

## 2021-11-19 PROBLEM — Z87.891 FORMER SMOKER: Status: ACTIVE | Noted: 2021-11-19

## 2021-11-19 PROBLEM — R06.02 SOB (SHORTNESS OF BREATH): Status: ACTIVE | Noted: 2021-11-19

## 2021-11-19 NOTE — PROGRESS NOTES
C/O Pulmonary evaluation and to discuss the test results      HPI: Patient has come to the office for a pulmonary follow-up and to discuss the test results, patient was seen by Dr. Clem Gary in the past, patient states that he is somewhat depressed and under the weather because her mother has severe dementia along with that patient states that there is a wait list for many months to a year to get senior member care along with that patient states that to her brothers and her best friend  recently, patient states that she does have increased shortness of breath especially on exertion, patient does have some increased coughing which is mostly mucoid in nature but with some green tinge at times, patient does have some wheezing, patient does not have any increasing sinus issues, patient does have some ear pain on the right side, patient does not have any fever chills or any palpitations, patient on questioning further states that she does not have any significant abdominal pain but does have some reflux symptoms for which she takes Nexium, patient does not have any epistaxis hemoptysis hematochezia melena, patient does not have any dysuria or hematuria, patient does not have any increasing leg swelling, patient has remains abstinent from smoking from , patient does not have any change in the ambient murmur any sick contacts, patient states that she does not snore but patient states that she used to sleep walk and it was attributed to her using BuSpar and Paxil which has been discontinued with improvement, patient does not have any other pertinent review of system of concern        Review of Systems same as above     Physical Exam:  Blood pressure (!) 133/92, pulse 106, temperature 97.8 °F (36.6 °C), temperature source Temporal, resp. rate 18, height 5' 6\" (1.676 m), weight 127 lb 12.8 oz (58 kg), SpO2 97 %.'  Constitutional:  No acute distress. HENT:  Oropharynx is clear and moist. Nothyromegaly.   Eyes: Conjunctivae are normal. Pupils equal, round, and reactive to light. No scleral icterus. Neck: . No tracheal deviation present. No obviousthyroid mass. Cardiovascular: Normal rate, regular rhythm, normal heart sounds. No right ventricular heave. No lower extremity edema. Pulmonary/Chest: No wheezes. No rales. Chest wall is not dull to percussion. No accessory muscle usage or stridor. Decreased BSI  Abdominal: Soft. Bowel sounds present. No distension or hernia. No tenderness. Musculoskeletal : No cyanosis. No clubbing. No obvious joint deformity. Lymphadenopathy: No cervical or supraclavicularadenopathy. Skin: Skin is warm and dry. No rash or nodules on the exposed extremities. Psychiatric: Normal mood and affect. Behavior is normal.  No anxiety. Neurologic : Alert, awake and oriented. PERRL. Speechfluent      Sleep Medicine Data:  Sitting and reading: Would never doze  Watching TV: Moderate chance of dozing  Sitting, inactive in a public place (e.g. a theatre or a meeting): Would never doze  As a passenger in a car for an hour without a break: Would never doze  Lying down to rest in the afternoon when circumstances permit: Moderate chance of dozing  Sitting and talking to someone: Would never doze  Sitting quietly after a lunch without alcohol: Would never doze  In a car, while stopped for a few minutes in traffic: Would never doze  Total score: 4  Neck circumference: 13.25        Data:     Imaging:  I have reviewed radiology images personally. No orders to display     CT OF THE ABDOMEN AND PELVIS WITHOUT CONTRAST; CTA OF THE CHEST 9/10/2021   2:09 pm       TECHNIQUE:   CT of the abdomen and pelvis was performed without the administration of   intravenous contrast. Multiplanar reformatted images are provided for review.    Dose modulation, iterative reconstruction, and/or weight based adjustment of   the mA/kV was utilized to reduce the radiation dose to as low as reasonably   achievable.; CTA of the chest was performed after the administration of   intravenous contrast.  Multiplanar reformatted images are provided for   review.  MIP images are provided for review. Dose modulation, iterative   reconstruction, and/or weight based adjustment of the mA/kV was utilized to   reduce the radiation dose to as low as reasonably achievable.       COMPARISON:   None.       HISTORY:   ORDERING SYSTEM PROVIDED HISTORY: abdominal pain/sob   TECHNOLOGIST PROVIDED HISTORY:   Additional Contrast?->None   Reason for exam:->abdominal pain/sob   Decision Support Exception - unselect if not a suspected or confirmed   emergency medical condition->Emergency Medical Condition ([place])   Reason for Exam: muscle spasms all over body, headache, chest pain,  rt arm   pain, abd pain   Acuity: Acute   Type of Exam: Initial   Relevant Medical/Surgical History: gb, hysterectomy; ORDERING SYSTEM PROVIDED   HISTORY: sob/weight loss   TECHNOLOGIST PROVIDED HISTORY:   Reason for exam:->sob/weight loss   Decision Support Exception - unselect if not a suspected or confirmed   emergency medical condition->Emergency Medical Condition (MA)   Reason for Exam: muscle spasms all over body, headache, chest pain,  rt arm   pain, abd pain   Acuity: Acute   Type of Exam: Initial       FINDINGS:   Chest:       Pulmonary Arteries: Pulmonary arteries are adequately opacified for   evaluation.  No pulmonary embolism demonstrated.  Main pulmonary artery is   normal in caliber.       Mediastinum: The heart and pericardium demonstrate no acute abnormality.    There is no acute abnormality of the thoracic aorta.       Lungs/pleura: Severe emphysema.  No pneumothorax or pleural effusion.  No   evidence of pulmonary edema or pneumonia.  Several small areas of mucous   plugging in the bronchi bilaterally are noted.  Mild bilateral perihilar   bronchial wall thickening also appears present, bronchitis.  Small elongated   nodule like focus in the tip of the lingula measuring 1.1 by 0.5 cm.       Soft Tissues/Bones: No acute bone or soft tissue abnormality.  Remote   appearing corticated compression fracture deformity of T7 with anterior   wedging and endplate depression, 93% loss of height anteriorly.  No   retropulsion into the spinal canal.  No acute fracture.       Abdomen/pelvis:       Organs: No acute abnormality of the organs of the abdomen. No evidence of   pancreatitis.  No ureteral stone or hydronephrosis. No evidence of   pyelonephritis. Cholecystectomy has been performed.       GI/Bowel: No bowel obstruction or other acute process demonstrated.  No   evidence of colitis, diverticulitis or appendicitis. [place] is sigmoid   diverticulosis but no evidence of diverticulitis.       Pelvis: No acute abnormality of the pelvis. No evidence of cystitis.       Peritoneum/Retroperitoneum: No free air, ascites or abscess.       Bones/Soft Tissues: No fracture or other acute osseous process.           Impression   Chest:       No evidence of pulmonary embolism.       Severe emphysema.       Nodule like focus in the inferior tip of the lingula, focal nodule like   atelectasis versus true nodule.  Recommend follow-up chest CT in 3 months or   PET-CT at this time.       Bronchitis.       Abdomen/pelvis:       Negative CT examination of abdomen/pelvis as discussed. DATE OF PROCEDURE:  11/03/2021     A full PFT done. FEV1 of 1.80, 71% predicted, FVC of 3.42, 103%  predicted, FEV1 to FVC ratio of 53% showing a mild obstructive lung  defect with no bronchodilator effect. Lung volumes do show no  hyperinflation. Do show air trapping. No restrictive lung defect. ERV  is 1ow which could be consistent with weight. Diffusion is normal.   Good effort.     IMPRESSION:  Mild obstructive lung defect with no bronchodilator effect,  air trapping and diffusion is normal.  Flow-volume loops are consistent  with the diagnosis. Assessment:    1. SOB (shortness of breath)      2. Panlobular emphysema ([facility] [address]      3. Simple chronic bronchitis ([facility] [address]      4. Gastroesophageal reflux disease, unspecified whether esophagitis present      5. Former smoker      6.  Elevated blood pressure reading          Plan:   · Patient's review of system were discussed  · Patient was told about the clinical finding including auscultation and implications  · Patient was shown the CT of the chest was done earlier along with interpretation/implications/options  · Patient was told about the pathophysiology of the disease process and its modifying factors  · Patient was congratulated on stopping smoking and was told to remain abstinent from smoking  · Patient's PFT results were reviewed with the patient along with interpretation and implications  · Patient to continue with Christiano Opp as given before and to take 1 puff daily and to rinse mouth with water after use  · Albuterol inhaler 2 puffs every 6 on whenever necessary basis  · Patient does not need any antibiotics or steroids from pulmonary standpoint of view  · Patient has slightly elevated blood pressure and also on the PFT it was noticed on 6-minute walk test that patient's blood pressure increases on ambulation and patient needs to discuss with PCP about options if deemed appropriate  · Patient to continue with PPI as given by PCP for GERD  · Diet and lifestyle modifications discussed  · Patient to avoid any sudden change of temperature pressure or humidity or any sick contacts  · A humidifier in the bedroom will help  · Steam inhalation at nighttime will help  · Patient to follow-up with Dr. Clem Gary in 6 months time or earlier if required

## 2021-12-01 ENCOUNTER — TELEPHONE (OUTPATIENT)
Dept: CASE MANAGEMENT | Age: 66
End: 2021-12-01

## 2022-02-25 NOTE — ANESTHESIA PROCEDURE NOTES
Peripheral Block    Patient location during procedure: pre-op  Staffing  Anesthesiologist: Karyle Aliment, MD  Performed: anesthesiologist   Preanesthetic Checklist  Completed: patient identified, site marked, surgical consent, pre-op evaluation, timeout performed, IV checked, risks and benefits discussed, monitors and equipment checked, anesthesia consent given, oxygen available and patient being monitored  Peripheral Block  Patient position: supine  Prep: ChloraPrep  Patient monitoring: continuous pulse ox and IV access  Block type: Fascia iliaca  Laterality: right  Injection technique: single-shot  Procedures: ultrasound guided  Local infiltration: lidocaine  Infiltration strength: 1 %  Dose: 3 mL  Provider prep: mask and sterile gloves  Local infiltration: lidocaine  Needle  Needle type: combined needle/nerve stimulator   Needle gauge: 21 G  Needle length: 10 cm  Needle localization: ultrasound guidance  Assessment  Injection assessment: negative aspiration for heme, no paresthesia on injection and local visualized surrounding nerve on ultrasound  Paresthesia pain: none  Slow fractionated injection: yes  Hemodynamics: stable  Additional Notes  Immediately prior to procedure a \"time out\" was called to verify the correct patient, allergies, laterality, procedure and equipment. Time out performed with  RN    Local Anesthetic: 0.125 %  Bupivacaine plus epi 1 to 400K  Amount: 60 ml  in 5 ml increments after negative aspiration each time.       Reason for block: post-op pain management and at surgeon's request [Initial Consultation] : an initial consultation for [Mother] : mother [[email or web address]] : referred Dr. Jamil (ENT) for left ear hearing loss

## 2022-02-26 ENCOUNTER — APPOINTMENT (OUTPATIENT)
Dept: CT IMAGING | Age: 67
DRG: 190 | End: 2022-02-26
Payer: MEDICARE

## 2022-02-26 ENCOUNTER — HOSPITAL ENCOUNTER (INPATIENT)
Age: 67
LOS: 3 days | Discharge: HOME | DRG: 190 | End: 2022-03-03
Attending: EMERGENCY MEDICINE | Admitting: FAMILY MEDICINE
Payer: MEDICARE

## 2022-02-26 ENCOUNTER — APPOINTMENT (OUTPATIENT)
Dept: GENERAL RADIOLOGY | Age: 67
DRG: 190 | End: 2022-02-26
Payer: MEDICARE

## 2022-02-26 DIAGNOSIS — R11.2 NAUSEA AND VOMITING, INTRACTABILITY OF VOMITING NOT SPECIFIED, UNSPECIFIED VOMITING TYPE: ICD-10-CM

## 2022-02-26 DIAGNOSIS — K57.90 DIVERTICULOSIS: ICD-10-CM

## 2022-02-26 DIAGNOSIS — J44.9 CHRONIC OBSTRUCTIVE PULMONARY DISEASE, UNSPECIFIED COPD TYPE (HCC): ICD-10-CM

## 2022-02-26 DIAGNOSIS — R06.09 DYSPNEA ON EXERTION: ICD-10-CM

## 2022-02-26 DIAGNOSIS — J44.1 COPD EXACERBATION (HCC): Primary | ICD-10-CM

## 2022-02-26 LAB
A/G RATIO: 2.1 (ref 1.1–2.2)
ALBUMIN SERPL-MCNC: 4.7 G/DL (ref 3.4–5)
ALP BLD-CCNC: 110 U/L (ref 40–129)
ALT SERPL-CCNC: 18 U/L (ref 10–40)
ANION GAP SERPL CALCULATED.3IONS-SCNC: 13 MMOL/L (ref 3–16)
AST SERPL-CCNC: 17 U/L (ref 15–37)
BASE EXCESS VENOUS: -0.9 MMOL/L (ref -3–3)
BASOPHILS ABSOLUTE: 0.1 K/UL (ref 0–0.2)
BASOPHILS RELATIVE PERCENT: 1.2 %
BILIRUB SERPL-MCNC: 0.6 MG/DL (ref 0–1)
BUN BLDV-MCNC: 16 MG/DL (ref 7–20)
CALCIUM SERPL-MCNC: 9.6 MG/DL (ref 8.3–10.6)
CARBOXYHEMOGLOBIN: 2.7 % (ref 0–1.5)
CHLORIDE BLD-SCNC: 100 MMOL/L (ref 99–110)
CO2: 22 MMOL/L (ref 21–32)
CREAT SERPL-MCNC: 0.7 MG/DL (ref 0.6–1.2)
EOSINOPHILS ABSOLUTE: 0.2 K/UL (ref 0–0.6)
EOSINOPHILS RELATIVE PERCENT: 1.5 %
GFR AFRICAN AMERICAN: >60
GFR NON-AFRICAN AMERICAN: >60
GLUCOSE BLD-MCNC: 155 MG/DL (ref 70–99)
HCO3 VENOUS: 24.3 MMOL/L (ref 23–29)
HCT VFR BLD CALC: 42.3 % (ref 36–48)
HEMOGLOBIN: 14 G/DL (ref 12–16)
INFLUENZA A: NOT DETECTED
INFLUENZA B: NOT DETECTED
LACTIC ACID, SEPSIS: 1.2 MMOL/L (ref 0.4–1.9)
LACTIC ACID, SEPSIS: 1.9 MMOL/L (ref 0.4–1.9)
LYMPHOCYTES ABSOLUTE: 2.2 K/UL (ref 1–5.1)
LYMPHOCYTES RELATIVE PERCENT: 19.3 %
MCH RBC QN AUTO: 28.2 PG (ref 26–34)
MCHC RBC AUTO-ENTMCNC: 33.2 G/DL (ref 31–36)
MCV RBC AUTO: 84.8 FL (ref 80–100)
METHEMOGLOBIN VENOUS: 0.3 %
MONOCYTES ABSOLUTE: 1.1 K/UL (ref 0–1.3)
MONOCYTES RELATIVE PERCENT: 9.8 %
NEUTROPHILS ABSOLUTE: 7.9 K/UL (ref 1.7–7.7)
NEUTROPHILS RELATIVE PERCENT: 68.2 %
O2 SAT, VEN: 78 %
O2 THERAPY: ABNORMAL
PCO2, VEN: 42 MMHG (ref 40–50)
PDW BLD-RTO: 14.6 % (ref 12.4–15.4)
PH VENOUS: 7.38 (ref 7.35–7.45)
PLATELET # BLD: 368 K/UL (ref 135–450)
PMV BLD AUTO: 6.6 FL (ref 5–10.5)
PO2, VEN: 42.8 MMHG (ref 25–40)
POTASSIUM REFLEX MAGNESIUM: 3.7 MMOL/L (ref 3.5–5.1)
PRO-BNP: 152 PG/ML (ref 0–124)
PROCALCITONIN: 0.05 NG/ML (ref 0–0.15)
RBC # BLD: 4.99 M/UL (ref 4–5.2)
SARS-COV-2 RNA, RT PCR: NOT DETECTED
SODIUM BLD-SCNC: 135 MMOL/L (ref 136–145)
TCO2 CALC VENOUS: 26 MMOL/L
TOTAL PROTEIN: 6.9 G/DL (ref 6.4–8.2)
TROPONIN: <0.01 NG/ML
WBC # BLD: 11.6 K/UL (ref 4–11)

## 2022-02-26 PROCEDURE — 83605 ASSAY OF LACTIC ACID: CPT

## 2022-02-26 PROCEDURE — 96365 THER/PROPH/DIAG IV INF INIT: CPT

## 2022-02-26 PROCEDURE — 71046 X-RAY EXAM CHEST 2 VIEWS: CPT

## 2022-02-26 PROCEDURE — 87636 SARSCOV2 & INF A&B AMP PRB: CPT

## 2022-02-26 PROCEDURE — 36415 COLL VENOUS BLD VENIPUNCTURE: CPT

## 2022-02-26 PROCEDURE — 71260 CT THORAX DX C+: CPT

## 2022-02-26 PROCEDURE — 83880 ASSAY OF NATRIURETIC PEPTIDE: CPT

## 2022-02-26 PROCEDURE — 82803 BLOOD GASES ANY COMBINATION: CPT

## 2022-02-26 PROCEDURE — 6360000002 HC RX W HCPCS: Performed by: NURSE PRACTITIONER

## 2022-02-26 PROCEDURE — 84484 ASSAY OF TROPONIN QUANT: CPT

## 2022-02-26 PROCEDURE — 84145 PROCALCITONIN (PCT): CPT

## 2022-02-26 PROCEDURE — 99285 EMERGENCY DEPT VISIT HI MDM: CPT

## 2022-02-26 PROCEDURE — 93005 ELECTROCARDIOGRAM TRACING: CPT | Performed by: NURSE PRACTITIONER

## 2022-02-26 PROCEDURE — 85025 COMPLETE CBC W/AUTO DIFF WBC: CPT

## 2022-02-26 PROCEDURE — 96375 TX/PRO/DX INJ NEW DRUG ADDON: CPT

## 2022-02-26 PROCEDURE — 74177 CT ABD & PELVIS W/CONTRAST: CPT

## 2022-02-26 PROCEDURE — 80053 COMPREHEN METABOLIC PANEL: CPT

## 2022-02-26 PROCEDURE — 6360000004 HC RX CONTRAST MEDICATION: Performed by: NURSE PRACTITIONER

## 2022-02-26 PROCEDURE — 87040 BLOOD CULTURE FOR BACTERIA: CPT

## 2022-02-26 RX ORDER — ONDANSETRON 2 MG/ML
4 INJECTION INTRAMUSCULAR; INTRAVENOUS ONCE
Status: COMPLETED | OUTPATIENT
Start: 2022-02-26 | End: 2022-02-26

## 2022-02-26 RX ORDER — MAGNESIUM SULFATE IN WATER 40 MG/ML
2000 INJECTION, SOLUTION INTRAVENOUS ONCE
Status: COMPLETED | OUTPATIENT
Start: 2022-02-26 | End: 2022-02-26

## 2022-02-26 RX ORDER — ALBUTEROL SULFATE 2.5 MG/3ML
5 SOLUTION RESPIRATORY (INHALATION) ONCE
Status: COMPLETED | OUTPATIENT
Start: 2022-02-26 | End: 2022-02-26

## 2022-02-26 RX ORDER — ALBUTEROL SULFATE 2.5 MG/3ML
5 SOLUTION RESPIRATORY (INHALATION) ONCE
Status: DISCONTINUED | OUTPATIENT
Start: 2022-02-26 | End: 2022-03-03 | Stop reason: HOSPADM

## 2022-02-26 RX ADMIN — IOPAMIDOL 85 ML: 755 INJECTION, SOLUTION INTRAVENOUS at 19:27

## 2022-02-26 RX ADMIN — MAGNESIUM SULFATE IN WATER 2000 MG: 40 INJECTION, SOLUTION INTRAVENOUS at 18:48

## 2022-02-26 RX ADMIN — ALBUTEROL SULFATE 5 MG: 2.5 SOLUTION RESPIRATORY (INHALATION) at 18:48

## 2022-02-26 RX ADMIN — ONDANSETRON HYDROCHLORIDE 4 MG: 2 INJECTION, SOLUTION INTRAMUSCULAR; INTRAVENOUS at 22:16

## 2022-02-26 ASSESSMENT — ENCOUNTER SYMPTOMS
ABDOMINAL PAIN: 1
SHORTNESS OF BREATH: 1
VOMITING: 1
NAUSEA: 1
SORE THROAT: 0
COLOR CHANGE: 0
RHINORRHEA: 0

## 2022-02-26 ASSESSMENT — PAIN - FUNCTIONAL ASSESSMENT: PAIN_FUNCTIONAL_ASSESSMENT: 0-10

## 2022-02-26 ASSESSMENT — PAIN SCALES - GENERAL: PAINLEVEL_OUTOF10: 7

## 2022-02-26 ASSESSMENT — PAIN DESCRIPTION - LOCATION: LOCATION: ABDOMEN;CHEST;RIB CAGE

## 2022-02-26 NOTE — ED PROVIDER NOTES
Evaluated by [id number] [facility] Provider          [facility] ED  EMERGENCY DEPARTMENT ENCOUNTER        Pt Name: Miranda Ahumada  MRN: [id number]  Greggfoscar 1955  Dateof evaluation: 2/26/2022  Provider: NORMAN Chowdhury - GLORIA  PCP: Marychuy Verde  ED Attending: No att. providers found    [facility]       Chief Complaint   Patient presents with    Shortness of Breath     Patient arrived via EMS for SOB. Seen by PCP for inc SOB 2 weeks ago, was put on a z pack and steroid, wasn't any better so last Monday PCP put her on amoxicillin. EMS put patient on 2L NC and gave her duoneb, atrovent, solumedrol, and zofran. HISTORY OF PRESENTILLNESS   (Location/Symptom, Timing/Onset, Context/Setting, Quality, Duration, Modifying Factors, Severity)  Note limiting factors. Miranda Ahumada is a 73 y.o. female for increased shortness of breath. Onset was 2 weeks. Context includes patient reports that she is at increased shortness of breath for the last 2 weeks. Patient reports that she saw her primary care doctor 2 weeks ago and was given a Z-Viktor and steroids. Patient reports that she completed those medications and was not any better. Patient reports that 1 week ago her doctor called in Augmentin and she has been doing frequent breathing treatments however cannot increase shortness of breath. EMS reports that there was no electricity at her house that she was unable to do her nebulizers as well. Patient presents today for increased shortness of breath. Patient denies any chest pain. She does complain of generalized abdominal discomfort has had nausea and vomiting. Alleviating factors include nothing. Aggravating factors include nothing. Pain is 7/10. Nothing has been used for pain today. Nursing Notes were all reviewed and agreed with or any disagreements were addressed  in the HPI.     REVIEW OF SYSTEMS    (2-9 systems for level 4, 10 or more for level 5)     Review of Systems   Constitutional: Negative for fever. HENT: Negative for congestion, rhinorrhea and sore throat. Respiratory: Positive for shortness of breath. Cardiovascular: Negative for chest pain. Gastrointestinal: Positive for abdominal pain, nausea and vomiting. Genitourinary: Negative for decreased urine volume and difficulty urinating. Musculoskeletal: Negative for arthralgias and myalgias. Skin: Negative for color change and rash. Neurological: Negative for dizziness and light-headedness. Psychiatric/Behavioral: Negative for agitation. All other systems reviewed and are negative. Positives and Pertinent negatives as per HPI. Except as noted above in the ROS, all other systems were reviewed and negative. PAST MEDICAL HISTORY     Past Medical History:   Diagnosis Date    COPD (chronic obstructive pulmonary disease) ([facility] [address]     DDD (degenerative disc disease)     Fibromyalgia     Hyperlipidemia     Sciatica          SURGICAL HISTORY       Past Surgical History:   Procedure Laterality Date    CHOLECYSTECTOMY      FEMUR FRACTURE SURGERY Right 7/6/2020    RIGHT FEMUR OPEN REDUCTION INTERNAL FIXATION performed by Sahara Lou DO at [facility] HAND SURGERY      HYSTERECTOMY      JOINT REPLACEMENT      KNEE SURGERY      OTHER SURGICAL HISTORY      RIGHT FEMUR OPEN REDUCTION INTERNAL FIXATION (Right    TONSILLECTOMY           CURRENT MEDICATIONS       Previous Medications    ALBUTEROL (PROVENTIL) 2.5 MG/0.5ML NEBU NEBULIZER SOLUTION    Take 0.5 mLs by nebulization every 6 hours as needed for Wheezing or Shortness of Breath    ALBUTEROL SULFATE  (90 BASE) MCG/ACT INHALER    Use 2 puffs 4 times daily for 7 days then as needed for wheezing. Dispense with Spacer and instruct in use. At patient's preference may use 60 dose MDI. May Sub Pro-Air or Proventil as needed per insurance.     ATORVASTATIN (LIPITOR) 40 MG TABLET    Take 20 mg by mouth daily     BUSPIRONE (BUSPAR) 10 MG TABLET    Take 10 mg by mouth 3 times daily    DICLOFENAC SODIUM (VOLTAREN) 1 % GEL    Apply 2 g topically 4 times daily    DICYCLOMINE (BENTYL) 20 MG TABLET    Take 20 mg by mouth every 6 hours    ESOMEPRAZOLE MAGNESIUM (NEXIUM) 40 MG PACK    Take 40 mg by mouth daily. FERROUS SULFATE (IRON 325) 325 (65 FE) MG TABLET    Take 1 tablet by mouth 2 times daily    FLUTICASONE (VERAMYST) 27.5 MCG/SPRAY NASAL SPRAY    2 sprays by Each Nostril route daily    GABAPENTIN (NEURONTIN) 300 MG CAPSULE    Take 600 mg by mouth three times daily. GUAIFENESIN (MUCINEX) 600 MG EXTENDED RELEASE TABLET    Take 1 tablet by mouth 2 times daily    HYDROXYZINE (ATARAX) 50 MG TABLET    Take 50 mg by mouth 3 times daily as needed for Itching    PAROXETINE (PAXIL) 40 MG TABLET    Take 40 mg by mouth every morning    TRAZODONE (DESYREL) 100 MG TABLET    Take 100 mg by mouth nightly    TRELEGY ELLIPTA 100-62.5-25 MCG/INH AEPB    Inhale 1 puff into the lungs 2 times daily    ZAFIRLUKAST (ACCOLATE) 20 MG TABLET    Take 20 mg by mouth 2 times daily. ALLERGIES     Patient has no known allergies. FAMILY HISTORY     History reviewed. No pertinent family history.        SOCIAL HISTORY       Social History     Socioeconomic History    Marital status: Single     Spouse name: None    Number of children: None    Years of education: None    Highest education level: None   Occupational History    None   Tobacco Use    Smoking status: Former Smoker     Packs/day: 0.50     Types: Cigarettes     Start date: 1974     Quit date: 2020     Years since quittin.1    Smokeless tobacco: Never Used   Substance and Sexual Activity    Alcohol use: No    Drug use: No    Sexual activity: None   Other Topics Concern    None   Social History Narrative    None     Social Determinants of Health     Financial Resource Strain:     Difficulty of Paying Living Expenses: Not on file   Food Insecurity:     Worried About Running Out of Food in the Last Year: Not on file    Ran Out of Food in the Last Year: Not on file   Transportation Needs:     Lack of Transportation (Medical): Not on file    Lack of Transportation (Non-Medical): Not on file   Physical Activity:     Days of Exercise per Week: Not on file    Minutes of Exercise per Session: Not on file   Stress:     Feeling of Stress : Not on file   Social Connections:     Frequency of Communication with Friends and Family: Not on file    Frequency of Social Gatherings with Friends and Family: Not on file    Attends Protestant Services: Not on file    Active Member of [address] [place] [address] [employer] or Organizations: Not on file    Attends Club or Organization Meetings: Not on file    Marital Status: Not on file   Intimate Partner Violence:     Fear of Current or Ex-Partner: Not on file    Emotionally Abused: Not on file    Physically Abused: Not on file    Sexually Abused: Not on file   Housing Stability:     Unable to Pay for Housing in the Last Year: Not on file    Number of [place] in the Last Year: Not on file    Unstable Housing in the Last Year: Not on file       SCREENINGS    [place] Coma Scale  Eye Opening: Spontaneous  Best Verbal Response: Oriented  Best Motor Response: Obeys commands  [place] Coma Scale Score: 15        PHYSICAL EXAM  (up to 7 for level 4, 8 or more for level 5)     ED Triage Vitals   BP Temp Temp Source Pulse Resp SpO2 Height Weight   02/26/22 1757 02/26/22 1757 02/26/22 1757 02/26/22 1757 02/26/22 1757 02/26/22 1757 02/26/22 1759 02/26/22 1759   (!) 160/84 98.2 °F (36.8 °C) Oral 103 22 94 % 5' 5\" (1.651 m) 128 lb (58.1 kg)       Physical Exam  Constitutional:       Appearance: She is well-developed. HENT:      Head: Normocephalic and atraumatic. Cardiovascular:      Rate and Rhythm: Normal rate. Pulmonary:      Effort: Tachypnea and respiratory distress present. Breath sounds: Examination of the left-upper field reveals wheezing.  Examination of the left-middle field reveals wheezing. Examination of the right-lower field reveals decreased breath sounds. Examination of the left-lower field reveals decreased breath sounds. Decreased breath sounds and wheezing present. Abdominal:      General: There is no distension. Palpations: Abdomen is soft. Musculoskeletal:         General: Normal range of motion. Cervical back: Normal range of motion. Skin:     General: Skin is warm and dry. Neurological:      Mental Status: She is alert and oriented to person, place, and time.          DIAGNOSTIC RESULTS   LABS:    Labs Reviewed   CBC WITH AUTO DIFFERENTIAL - Abnormal; Notable for the following components:       Result Value    WBC 11.6 (*)     Neutrophils Absolute 7.9 (*)     All other components within normal limits    Narrative:     Performed at:  [facility],  [employer] [facility]   Phone [phone]   COMPREHENSIVE METABOLIC PANEL W/ REFLEX TO MG FOR LOW K - Abnormal; Notable for the following components:    Sodium 135 (*)     Glucose 155 (*)     All other components within normal limits    Narrative:     Performed at:  [facility]) [place] [address],  [employer] [facility]   Phone [phone]   BRAIN NATRIURETIC PEPTIDE - Abnormal; Notable for the following components:    Pro- (*)     All other components within normal limits    Narrative:     Performed at:  [facility],  [place], [facility]   Phone [phone]   BLOOD GAS, VENOUS - Abnormal; Notable for the following components:    pO2, Edgar 42.8 (*)     Carboxyhemoglobin 2.7 (*)     All other components within normal limits    Narrative:     Performed at:  [facility],  [email or web address]   Phone [phone] & [employer]    Narrative:     Performed at:  [facility] and pelvis CT:     Extensive diverticulosis of the large bowel, especially the region of the   sigmoid colon, but without convincing CT evidence of diverticulitis. Status post cholecystectomy. Interpretation per the Radiologist below, if available at the time of this note:    CT ABDOMEN PELVIS W IV CONTRAST Additional Contrast? None   Final Result   CT PA:      No evidence of pulmonary embolism or aortic dissection. Diffuse bronchial wall thickening. Filling defects are noted in the lower   lung airways bilaterally, greater on the right. Differential considerations   include mucous plugging and aspiration. Emphysema. Abdomen and pelvis CT:      Extensive diverticulosis of the large bowel, especially the region of the   sigmoid colon, but without convincing CT evidence of diverticulitis. Status post cholecystectomy. CT CHEST PULMONARY EMBOLISM W CONTRAST   Final Result   CT PA:      No evidence of pulmonary embolism or aortic dissection. Diffuse bronchial wall thickening. Filling defects are noted in the lower   lung airways bilaterally, greater on the right. Differential considerations   include mucous plugging and aspiration. Emphysema. Abdomen and pelvis CT:      Extensive diverticulosis of the large bowel, especially the region of the   sigmoid colon, but without convincing CT evidence of diverticulitis. Status post cholecystectomy. XR CHEST (2 VW)   Final Result   Hyperinflated, clear lungs. No results found.       PROCEDURES   Unless otherwise noted below, none     Procedures     CRITICAL CARE TIME   N/A    CONSULTS:  IP CONSULT TO HOSPITALIST      EMERGENCYDEPARTMENT COURSE and DIFFERENTIAL DIAGNOSIS/MDM:   Vitals:    Vitals:    02/26/22 1915 02/26/22 1932 02/26/22 2107 02/26/22 2110   BP:  132/75     Pulse: 100 94 133 113   Resp: 22 20 (!) 35 18   Temp:       TempSrc:       SpO2: (!) 89% 98% 97% 96%   Weight:       Height: Patient was given the following medications:  Medications   ondansetron (ZOFRAN) injection 4 mg (has no administration in time range)   albuterol (PROVENTIL) nebulizer solution 5 mg (has no administration in time range)   magnesium sulfate 2000 mg in 50 mL IVPB premix (0 mg IntraVENous Stopped 2/26/22 1946)   albuterol (PROVENTIL) nebulizer solution 5 mg (5 mg Nebulization Given 2/26/22 1848)   iopamidol (ISOVUE-370) 76 % injection 85 mL (85 mLs IntraVENous Given 2/26/22 1927)       Patient was seen and evaluated by myself. Patient here for concerns for increased shortness of breath. Patient reports that she has had shortness of breath for the last 2 weeks. Patient reports that she has completed a Z-Viktor and steroids from her primary care doctor from 2 weeks ago. Patient continues to have symptoms. Patient states that her primary care doctor called in Augmentin and increased her breathing treatments last week however she does continue to have symptoms. Patient denies any chest pain. Patient does not use oxygen at home. Patient reports that she has not had electricity so she is not been able to do her breathing treatments at home. On exam the patient is awake and alert she was found to be tachycardic and tachypneic. She has wheezing and rhonchi. Lab values have been reviewed and interpreted. Patient was provided with breathing treatments and magnesium in the ED. Patient was ambulated in the emergency department however she became tachycardic with a heart rate in the 130s and tachypneic with a respiratory rate of 40s. She did not desaturate however due to her increased shortness of breath and abnormal vitals a consult was placed to the hospitalist for admission for COPD exacerbation. Hospitalist has returned the call and is accepted the patient. Patient's care was transferred to the inpatient unit. The patient tolerated their visit well. I have evaluated this patient.  My supervising physician was available for consultation. The patient and / or the family were informed of the results of any tests, a time was given to answer questions, a plan was proposed and they agreed with plan. FINAL IMPRESSION      1. COPD exacerbation ([address]    2. Dyspnea on exertion    3. Diverticulosis    4. Nausea and vomiting, intractability of vomiting not specified, unspecified vomiting type          DISPOSITION/PLAN   DISPOSITION Decision To Admit 02/26/2022 09:08:57 PM      PATIENT REFERRED TO:  No follow-up provider specified.     DISCHARGE MEDICATIONS:  New Prescriptions    No medications on file       DISCONTINUED MEDICATIONS:  Discontinued Medications    No medications on file              (Please note that portions of this note were completed with a voice recognition program.  Efforts were made to edit the dictations but occasionally words are mis-transcribed.)    NORMAN Reynolds CNP (electronically signed)         NORMAN Reynolds CNP  02/26/22 0172

## 2022-02-27 LAB
EKG ATRIAL RATE: 90 BPM
EKG DIAGNOSIS: NORMAL
EKG P AXIS: 87 DEGREES
EKG P-R INTERVAL: 134 MS
EKG Q-T INTERVAL: 350 MS
EKG QRS DURATION: 84 MS
EKG QTC CALCULATION (BAZETT): 428 MS
EKG R AXIS: 78 DEGREES
EKG T AXIS: 74 DEGREES
EKG VENTRICULAR RATE: 90 BPM

## 2022-02-27 PROCEDURE — G0378 HOSPITAL OBSERVATION PER HR: HCPCS

## 2022-02-27 PROCEDURE — 96372 THER/PROPH/DIAG INJ SC/IM: CPT

## 2022-02-27 PROCEDURE — 6360000002 HC RX W HCPCS: Performed by: FAMILY MEDICINE

## 2022-02-27 PROCEDURE — C9113 INJ PANTOPRAZOLE SODIUM, VIA: HCPCS | Performed by: FAMILY MEDICINE

## 2022-02-27 PROCEDURE — 6370000000 HC RX 637 (ALT 250 FOR IP): Performed by: FAMILY MEDICINE

## 2022-02-27 PROCEDURE — 2580000003 HC RX 258: Performed by: FAMILY MEDICINE

## 2022-02-27 PROCEDURE — 93010 ELECTROCARDIOGRAM REPORT: CPT | Performed by: INTERNAL MEDICINE

## 2022-02-27 PROCEDURE — 96375 TX/PRO/DX INJ NEW DRUG ADDON: CPT

## 2022-02-27 PROCEDURE — 87040 BLOOD CULTURE FOR BACTERIA: CPT

## 2022-02-27 PROCEDURE — 36415 COLL VENOUS BLD VENIPUNCTURE: CPT

## 2022-02-27 PROCEDURE — 94640 AIRWAY INHALATION TREATMENT: CPT

## 2022-02-27 PROCEDURE — 6370000000 HC RX 637 (ALT 250 FOR IP)

## 2022-02-27 RX ORDER — DICYCLOMINE HCL 20 MG
20 TABLET ORAL EVERY 6 HOURS
Status: DISCONTINUED | OUTPATIENT
Start: 2022-02-27 | End: 2022-02-27

## 2022-02-27 RX ORDER — GABAPENTIN 300 MG/1
600 CAPSULE ORAL 3 TIMES DAILY
Status: DISCONTINUED | OUTPATIENT
Start: 2022-02-27 | End: 2022-03-03 | Stop reason: HOSPADM

## 2022-02-27 RX ORDER — PANTOPRAZOLE SODIUM 40 MG/10ML
40 INJECTION, POWDER, LYOPHILIZED, FOR SOLUTION INTRAVENOUS DAILY
Status: DISCONTINUED | OUTPATIENT
Start: 2022-02-27 | End: 2022-03-01

## 2022-02-27 RX ORDER — PAROXETINE HYDROCHLORIDE 20 MG/1
40 TABLET, FILM COATED ORAL EVERY MORNING
Status: DISCONTINUED | OUTPATIENT
Start: 2022-02-27 | End: 2022-02-27

## 2022-02-27 RX ORDER — BUSPIRONE HYDROCHLORIDE 10 MG/1
10 TABLET ORAL 3 TIMES DAILY
Status: DISCONTINUED | OUTPATIENT
Start: 2022-02-27 | End: 2022-02-27

## 2022-02-27 RX ORDER — ACETAMINOPHEN 325 MG/1
650 TABLET ORAL EVERY 6 HOURS PRN
Status: DISCONTINUED | OUTPATIENT
Start: 2022-02-27 | End: 2022-03-03 | Stop reason: HOSPADM

## 2022-02-27 RX ORDER — ZAFIRLUKAST 20 MG/1
20 TABLET, FILM COATED ORAL 2 TIMES DAILY
Status: DISCONTINUED | OUTPATIENT
Start: 2022-02-27 | End: 2022-02-28 | Stop reason: CLARIF

## 2022-02-27 RX ORDER — PREDNISONE 20 MG/1
40 TABLET ORAL DAILY
Status: DISCONTINUED | OUTPATIENT
Start: 2022-02-27 | End: 2022-03-01

## 2022-02-27 RX ORDER — BUDESONIDE AND FORMOTEROL FUMARATE DIHYDRATE 160; 4.5 UG/1; UG/1
2 AEROSOL RESPIRATORY (INHALATION) 2 TIMES DAILY
Status: DISCONTINUED | OUTPATIENT
Start: 2022-02-27 | End: 2022-03-03 | Stop reason: HOSPADM

## 2022-02-27 RX ORDER — ATORVASTATIN CALCIUM 10 MG/1
20 TABLET, FILM COATED ORAL DAILY
Status: DISCONTINUED | OUTPATIENT
Start: 2022-02-27 | End: 2022-03-03 | Stop reason: HOSPADM

## 2022-02-27 RX ORDER — FERROUS SULFATE 325(65) MG
325 TABLET ORAL 2 TIMES DAILY
Status: DISCONTINUED | OUTPATIENT
Start: 2022-02-27 | End: 2022-03-03 | Stop reason: HOSPADM

## 2022-02-27 RX ORDER — BUSPIRONE HYDROCHLORIDE 10 MG/1
10 TABLET ORAL 3 TIMES DAILY
Status: DISCONTINUED | OUTPATIENT
Start: 2022-02-27 | End: 2022-03-03 | Stop reason: HOSPADM

## 2022-02-27 RX ORDER — POLYETHYLENE GLYCOL 3350 17 G/17G
17 POWDER, FOR SOLUTION ORAL DAILY PRN
Status: DISCONTINUED | OUTPATIENT
Start: 2022-02-27 | End: 2022-03-03 | Stop reason: HOSPADM

## 2022-02-27 RX ORDER — ONDANSETRON 4 MG/1
4 TABLET, ORALLY DISINTEGRATING ORAL EVERY 8 HOURS PRN
Status: DISCONTINUED | OUTPATIENT
Start: 2022-02-27 | End: 2022-03-03 | Stop reason: HOSPADM

## 2022-02-27 RX ORDER — TRAZODONE HYDROCHLORIDE 100 MG/1
100 TABLET ORAL NIGHTLY
Status: DISCONTINUED | OUTPATIENT
Start: 2022-02-27 | End: 2022-03-03 | Stop reason: HOSPADM

## 2022-02-27 RX ORDER — GUAIFENESIN 600 MG/1
600 TABLET, EXTENDED RELEASE ORAL 2 TIMES DAILY
Status: DISCONTINUED | OUTPATIENT
Start: 2022-02-27 | End: 2022-03-03 | Stop reason: HOSPADM

## 2022-02-27 RX ORDER — ONDANSETRON 2 MG/ML
4 INJECTION INTRAMUSCULAR; INTRAVENOUS EVERY 6 HOURS PRN
Status: DISCONTINUED | OUTPATIENT
Start: 2022-02-27 | End: 2022-03-03 | Stop reason: HOSPADM

## 2022-02-27 RX ORDER — ACETAMINOPHEN 650 MG/1
650 SUPPOSITORY RECTAL EVERY 6 HOURS PRN
Status: DISCONTINUED | OUTPATIENT
Start: 2022-02-27 | End: 2022-03-03 | Stop reason: HOSPADM

## 2022-02-27 RX ORDER — ESOMEPRAZOLE MAGNESIUM 40 MG/1
40 FOR SUSPENSION ORAL DAILY
Status: DISCONTINUED | OUTPATIENT
Start: 2022-02-27 | End: 2022-02-27 | Stop reason: SDUPTHER

## 2022-02-27 RX ORDER — ALBUTEROL SULFATE 2.5 MG/3ML
2.5 SOLUTION RESPIRATORY (INHALATION) EVERY 4 HOURS PRN
Status: DISCONTINUED | OUTPATIENT
Start: 2022-02-27 | End: 2022-03-01

## 2022-02-27 RX ORDER — IPRATROPIUM BROMIDE AND ALBUTEROL SULFATE 2.5; .5 MG/3ML; MG/3ML
1 SOLUTION RESPIRATORY (INHALATION)
Status: DISCONTINUED | OUTPATIENT
Start: 2022-02-27 | End: 2022-03-03 | Stop reason: HOSPADM

## 2022-02-27 RX ORDER — SODIUM CHLORIDE 0.9 % (FLUSH) 0.9 %
5-40 SYRINGE (ML) INJECTION EVERY 12 HOURS SCHEDULED
Status: DISCONTINUED | OUTPATIENT
Start: 2022-02-27 | End: 2022-03-03 | Stop reason: HOSPADM

## 2022-02-27 RX ORDER — SODIUM CHLORIDE 0.9 % (FLUSH) 0.9 %
5-40 SYRINGE (ML) INJECTION PRN
Status: DISCONTINUED | OUTPATIENT
Start: 2022-02-27 | End: 2022-03-03 | Stop reason: HOSPADM

## 2022-02-27 RX ORDER — HYDROXYZINE HYDROCHLORIDE 10 MG/1
50 TABLET, FILM COATED ORAL 3 TIMES DAILY PRN
Status: DISCONTINUED | OUTPATIENT
Start: 2022-02-27 | End: 2022-03-03 | Stop reason: HOSPADM

## 2022-02-27 RX ORDER — FLUTICASONE PROPIONATE 50 MCG
2 SPRAY, SUSPENSION (ML) NASAL DAILY
Status: DISCONTINUED | OUTPATIENT
Start: 2022-02-27 | End: 2022-03-03 | Stop reason: HOSPADM

## 2022-02-27 RX ORDER — SODIUM CHLORIDE 9 MG/ML
25 INJECTION, SOLUTION INTRAVENOUS PRN
Status: DISCONTINUED | OUTPATIENT
Start: 2022-02-27 | End: 2022-03-03 | Stop reason: HOSPADM

## 2022-02-27 RX ADMIN — BUSPIRONE HYDROCHLORIDE 10 MG: 10 TABLET ORAL at 13:43

## 2022-02-27 RX ADMIN — IPRATROPIUM BROMIDE AND ALBUTEROL SULFATE 1 AMPULE: 2.5; .5 SOLUTION RESPIRATORY (INHALATION) at 22:49

## 2022-02-27 RX ADMIN — BUSPIRONE HYDROCHLORIDE 10 MG: 10 TABLET ORAL at 20:38

## 2022-02-27 RX ADMIN — SODIUM CHLORIDE, PRESERVATIVE FREE 10 ML: 5 INJECTION INTRAVENOUS at 07:37

## 2022-02-27 RX ADMIN — TRAZODONE HYDROCHLORIDE 100 MG: 100 TABLET ORAL at 20:37

## 2022-02-27 RX ADMIN — IPRATROPIUM BROMIDE AND ALBUTEROL SULFATE 1 AMPULE: 2.5; .5 SOLUTION RESPIRATORY (INHALATION) at 08:12

## 2022-02-27 RX ADMIN — ENOXAPARIN SODIUM 40 MG: 100 INJECTION SUBCUTANEOUS at 07:38

## 2022-02-27 RX ADMIN — Medication 2 PUFF: at 08:13

## 2022-02-27 RX ADMIN — SODIUM CHLORIDE, PRESERVATIVE FREE 10 ML: 5 INJECTION INTRAVENOUS at 20:38

## 2022-02-27 RX ADMIN — ATORVASTATIN CALCIUM 20 MG: 10 TABLET, FILM COATED ORAL at 07:36

## 2022-02-27 RX ADMIN — IPRATROPIUM BROMIDE AND ALBUTEROL SULFATE 1 AMPULE: 2.5; .5 SOLUTION RESPIRATORY (INHALATION) at 11:15

## 2022-02-27 RX ADMIN — IPRATROPIUM BROMIDE AND ALBUTEROL SULFATE 1 AMPULE: 2.5; .5 SOLUTION RESPIRATORY (INHALATION) at 15:32

## 2022-02-27 RX ADMIN — FERROUS SULFATE TAB 325 MG (65 MG ELEMENTAL FE) 325 MG: 325 (65 FE) TAB at 07:36

## 2022-02-27 RX ADMIN — FERROUS SULFATE TAB 325 MG (65 MG ELEMENTAL FE) 325 MG: 325 (65 FE) TAB at 20:38

## 2022-02-27 RX ADMIN — IPRATROPIUM BROMIDE AND ALBUTEROL SULFATE 1 AMPULE: 2.5; .5 SOLUTION RESPIRATORY (INHALATION) at 19:17

## 2022-02-27 RX ADMIN — GABAPENTIN 600 MG: 300 CAPSULE ORAL at 13:43

## 2022-02-27 RX ADMIN — GUAIFENESIN 600 MG: 600 TABLET, EXTENDED RELEASE ORAL at 20:37

## 2022-02-27 RX ADMIN — FLUTICASONE PROPIONATE 2 SPRAY: 50 SPRAY, METERED NASAL at 07:38

## 2022-02-27 RX ADMIN — ACETAMINOPHEN 650 MG: 325 TABLET ORAL at 13:43

## 2022-02-27 RX ADMIN — GUAIFENESIN 600 MG: 600 TABLET, EXTENDED RELEASE ORAL at 07:37

## 2022-02-27 RX ADMIN — PANTOPRAZOLE SODIUM 40 MG: 40 INJECTION, POWDER, FOR SOLUTION INTRAVENOUS at 07:37

## 2022-02-27 RX ADMIN — PREDNISONE 40 MG: 20 TABLET ORAL at 07:37

## 2022-02-27 RX ADMIN — GABAPENTIN 600 MG: 300 CAPSULE ORAL at 07:36

## 2022-02-27 RX ADMIN — Medication 2 PUFF: at 19:17

## 2022-02-27 RX ADMIN — ACETAMINOPHEN 650 MG: 325 TABLET ORAL at 07:37

## 2022-02-27 RX ADMIN — BUSPIRONE HYDROCHLORIDE 10 MG: 10 TABLET ORAL at 07:37

## 2022-02-27 RX ADMIN — GABAPENTIN 600 MG: 300 CAPSULE ORAL at 20:38

## 2022-02-27 ASSESSMENT — PAIN SCALES - GENERAL
PAINLEVEL_OUTOF10: 2
PAINLEVEL_OUTOF10: 7
PAINLEVEL_OUTOF10: 5
PAINLEVEL_OUTOF10: 3

## 2022-02-27 ASSESSMENT — PAIN DESCRIPTION - PAIN TYPE: TYPE: ACUTE PAIN

## 2022-02-27 ASSESSMENT — PAIN DESCRIPTION - DESCRIPTORS: DESCRIPTORS: SHARP

## 2022-02-27 ASSESSMENT — PAIN DESCRIPTION - LOCATION: LOCATION: RIB CAGE

## 2022-02-27 ASSESSMENT — PAIN DESCRIPTION - FREQUENCY: FREQUENCY: INTERMITTENT

## 2022-02-27 ASSESSMENT — PAIN DESCRIPTION - ORIENTATION: ORIENTATION: RIGHT;LEFT

## 2022-02-27 NOTE — ACP (ADVANCE CARE PLANNING)
Advance Care Planning   Healthcare Decision Maker:    Primary Decision Maker: Fer Fredi - Child - [phone]    Click here to complete Healthcare Decision Makers including selection of the Healthcare Decision Maker Relationship (ie \"Primary\").

## 2022-02-27 NOTE — ED NOTES
Ambulation of patient completed, , RR 40s, oxygenation 93-96%. Patient is very short of breath. Reports, \" I have to sit down, I cant walk anymore. \" NP aware      Musa Moyer RN  02/26/22 6473

## 2022-02-27 NOTE — PROGRESS NOTES
Patient admitted to room 204 from ED. Patient oriented to room, call light, bed rails, phone, lights and bathroom. Patient instructed about the schedule of the day including: vital sign frequency, lab draws, possible tests, frequency of MD and staff rounds, daily weights, I &O's and prescribed diet. bed alarm in place, patient aware of placement and reason. Bed locked, in lowest position, side rails up 2/4, call light within reach. Recliner Assessment  Patient is able to demonstrate the ability to move from a reclining position to an upright position within the recliner. 4 Eyes Skin Assessment     The patient is being assess for   Admission    Patient denies presence of any wounds and refuses assessment.     Primary Nurse eSignature: Electronically signed by Daysi Soto RN on 2/27/22 at 4:51 AM EST

## 2022-02-27 NOTE — PLAN OF CARE
Problem: Infection:  Goal: Will remain free from infection  Description: Will remain free from infection  Outcome: Ongoing     Problem: Safety:  Goal: Free from accidental physical injury  Description: Free from accidental physical injury  Outcome: Ongoing  Goal: Free from intentional harm  Description: Free from intentional harm  Outcome: Ongoing     Problem: Daily Care:  Goal: Daily care needs are met  Description: Daily care needs are met  Outcome: Ongoing , fan for comfort     Problem: Pain:  Goal: Patient's pain/discomfort is manageable  Description: Patient's pain/discomfort is manageable  Outcome: Ongoing using prn medications     Problem: Skin Integrity:  Goal: Skin integrity will stabilize  Description: Skin integrity will stabilize  Outcome: Ongoing     Problem: Discharge Planning:  Goal: Patients continuum of care needs are met  Description: Patients continuum of care needs are met  Outcome: Ongoing

## 2022-02-27 NOTE — PROGRESS NOTES
Bedside report and transfer of care given to Aida Rodriguez. Pt currently resting in bed with the call light within reach. Pt denies any other care needs at this time. Pt stable at this time.

## 2022-02-27 NOTE — CARE COORDINATION
Case Management Assessment  Initial Evaluation      Patient Name: Dariusz Canas  YOB: 1955  Diagnosis: Diverticulosis [K57.90]  Dyspnea on exertion [R06.00]  COPD exacerbation ([facility] [address] [J44.1]  Nausea and vomiting, intractability of vomiting not specified, unspecified vomiting type [R11.2]  Date / Time: 2/26/2022  5:51 PM    Admission status/Date:02/26/22 inpatient   Chart Reviewed: Yes      Patient Interviewed: Yes   Family Interviewed:  No      Hospitalization in the last 30 days:  No    Health Care Decision Maker :  Pt stated her son Vinny Dick. She declined spiritual care consult for advance directives    Met with: pt  Interview conducted  (bedside/phone):bedside phone    Current PCP: [facility]     [address] [place] [address] required for SNF : Y          3 night stay required - [employer]  Support Systems/Care Needs:    Transportation: self    Meal Preparation: self    Housing  Living Arrangements: lives with family  Steps: 0  Intent for return to present living arrangements: Yes  Identified Issues: [address] [place] [address] with Home Health Care : No Agency:(Services)     Passport/Waiver : No  :                      Phone Number:    Passport/Waiver Services: n/a          Durable Medical Equiptment   DME Provider: n/a  Equipment:   Walker___Cane___RTS___ BSC___Shower Chair___Hospital Bed___W/C____Other________  02 at ____Liter(s)---wears(frequency)_______ HHN ___ CPAP___ BiPap___   N/A___x_      Home O2 Use :  No    If No for home O2---if presently on O2 during hospitalization:  Yes  if yes CM to follow for potential DC O2 need  Informed of need for care provider to bring portable home O2 tank on day of discharge for nursing to connect prior to leaving:   Not Indicated  Verbalized agreement/Understanding:   Not Indicated    Community Service Affiliation  Dialysis:  No    · Agency:  · Location:  · Dialysis Schedule:  · Phone:   · Fax:     Other Community Services: n/a    DISCHARGE PLAN: Explained Case Management role/services. Chart review completed. Spoke with pt. She states she is independent at home and plans on returning home. She states she doesn't anticipate skilled [facility] needs. She is aware CM will follow for possible home o2 and assist if she qualifies. She states understanding and denied current needs from CM. CM will follow. Please notify CM if needs or concerns arise.

## 2022-02-27 NOTE — PROGRESS NOTES
RT Inhaler-Nebulizer Bronchodilator Protocol Note    There is a bronchodilator order in the chart from a provider indicating to follow the RT Bronchodilator Protocol and there is an Initiate RT Inhaler-Nebulizer Bronchodilator Protocol order as well (see protocol at bottom of note). CXR Findings:  No results found. The findings from the last RT Protocol Assessment were as follows:   History Pulmonary Disease: Chronic pulmonary disease  Respiratory Pattern: Mild dyspnea at rest, irregular pattern, or RR 21-25 bpm  Breath Sounds: Inspiratory and expiratory or bilateral wheezing and/or rhonchi  Cough: Strong, spontaneous, non-productive  Indication for Bronchodilator Therapy: Wheezing associated with pulm disorder  Bronchodilator Assessment Score: 12    Aerosolized bronchodilator medication orders have been revised according to the RT Inhaler-Nebulizer Bronchodilator Protocol below. Respiratory Therapist to perform RT Therapy Protocol Assessment initially then follow the protocol. Repeat RT Therapy Protocol Assessment PRN for score 0-3 or on second treatment, BID, and PRN for scores above 3. No Indications - adjust the frequency to every 6 hours PRN wheezing or bronchospasm, if no treatments needed after 48 hours then discontinue using Per Protocol order mode. If indication present, adjust the RT bronchodilator orders based on the Bronchodilator Assessment Score as indicated below. Use Inhaler orders unless patient has one or more of the following: on home nebulizer, not able to hold breath for 10 seconds, is not alert and oriented, cannot activate and use MDI correctly, or respiratory rate 25 breaths per minute or more, then use the equivalent nebulizer order(s) with same Frequency and PRN reasons based on the score. If a patient is on this medication at home then do not decrease Frequency below that used at home.     0-3 - enter or revise RT bronchodilator order(s) to equivalent RT Bronchodilator order with Frequency of every 4 hours PRN for wheezing or increased work of breathing using Per Protocol order mode. 4-6 - enter or revise RT Bronchodilator order(s) to two equivalent RT bronchodilator orders with one order with BID Frequency and one order with Frequency of every 4 hours PRN wheezing or increased work of breathing using Per Protocol order mode. 7-10 - enter or revise RT Bronchodilator order(s) to two equivalent RT bronchodilator orders with one order with TID Frequency and one order with Frequency of every 4 hours PRN wheezing or increased work of breathing using Per Protocol order mode. 11-13 - enter or revise RT Bronchodilator order(s) to one equivalent RT bronchodilator order with QID Frequency and an Albuterol order with Frequency of every 4 hours PRN wheezing or increased work of breathing using Per Protocol order mode. Greater than 13 - enter or revise RT Bronchodilator order(s) to one equivalent RT bronchodilator order with every 4 hours Frequency and an Albuterol order with Frequency of every 2 hours PRN wheezing or increased work of breathing using Per Protocol order mode. RT to enter RT Home Evaluation for COPD & MDI Assessment order using Per Protocol order mode.     Electronically signed by Danny Jackson on 2/27/2022 at 8:17 AM

## 2022-02-27 NOTE — PROGRESS NOTES
AM assessment completed, see flow sheet. Pt is alert and oriented. Vital signs are WNL. Respirations are tachypneic with activity and speech. Titrated to 1 L/O2/NC but becomes dyspneic easily. Pt complaints voiced of muscle spasms in rib cage with movement medicated see MAR. Pt c/o \"hot flashes\" given bedside fan for comfort. Pt refused paxil stated no longer taking will update MD. Pt denies needs at this time. SR up x 2, and bed in low position. Call light is within reach.

## 2022-02-27 NOTE — PROGRESS NOTES
Patient arrived to room 204 from ED. Patient oriented to room. Vitals stable. Call light within reach.

## 2022-02-27 NOTE — H&P
[facility]) Internal Medicine History and Physical    Chief Complaint   Patient presents with    Shortness of Breath     Patient arrived via EMS for SOB. Seen by PCP for inc SOB 2 weeks ago, was put on a z pack and steroid, wasn't any better so last Monday PCP put her on amoxicillin. EMS put patient on 2L NC and gave her duoneb, atrovent, solumedrol, and zofran. HPI:  73 y.o. yo female with history of  has a past medical history of COPD (chronic obstructive pulmonary disease) ([facility] [address] DDD (degenerative disc disease), Fibromyalgia, Hyperlipidemia, and Sciatica. presents to [facility] complaining of  Shortness of breath that began 2 weeks ago w/o inciting event, denies sick contacts. Occurring at rest, no exacerbating factors, no improvements with breathing treatments or a course of azithromycin or augmentin prescribed by pcp. Ems found her in a home with no power with o2 sats in 80s%. Denies chest pain, palpitations. In the ed she is normoxic after breathing treatments. Still getting very short of breath with exertion. Her cxr only showed emphysema. Her procalcitonin is 0.5. will admit to obs for copd exacerbation. ROS:  A complete 14 point review of systems performed and is negative except as noted above. Past medical, surgical, family hx reviewed.     Past Medical History:  Past Medical History:   Diagnosis Date    COPD (chronic obstructive pulmonary disease) ([facility] [address]     DDD (degenerative disc disease)     Fibromyalgia     Hyperlipidemia     Sciatica          Surgical History:  Social History     Socioeconomic History    Marital status: Single     Spouse name: Not on file    Number of children: Not on file    Years of education: Not on file    Highest education level: Not on file   Occupational History    Not on file   Tobacco Use    Smoking status: Former Smoker     Packs/day: 0.50     Types: Cigarettes     Start date: 12/29/1974     Quit date: 12/29/2020     Years since quittin.1    Smokeless tobacco: Never Used   Substance and Sexual Activity    Alcohol use: No    Drug use: No    Sexual activity: Not on file   Other Topics Concern    Not on file   Social History Narrative    Not on file     Social Determinants of Health     Financial Resource Strain:     Difficulty of Paying Living Expenses: Not on file   Food Insecurity:     Worried About Running Out of Food in the Last Year: Not on file    Glenny of Food in the Last Year: Not on file   Transportation Needs:     Lack of Transportation (Medical): Not on file    Lack of Transportation (Non-Medical): Not on file   Physical Activity:     Days of Exercise per Week: Not on file    Minutes of Exercise per Session: Not on file   Stress:     Feeling of Stress : Not on file   Social Connections:     Frequency of Communication with Friends and Family: Not on file    Frequency of Social Gatherings with Friends and Family: Not on file    Attends [employer] Services: Not on file    Active Member of [address] [place] [address] or Organizations: Not on file    Attends Club or Organization Meetings: Not on file    Marital Status: Not on file   Intimate Partner Violence:     Fear of Current or Ex-Partner: Not on file    Emotionally Abused: Not on file    Physically Abused: Not on file    Sexually Abused: Not on file   Housing Stability:     Unable to Pay for Housing in the Last Year: Not on file    Number of [place] in the Last Year: Not on file    Unstable Housing in the Last Year: Not on file         Family History:  History reviewed. No pertinent family history. Home Meds:  No current facility-administered medications on file prior to encounter.      Current Outpatient Medications on File Prior to Encounter   Medication Sig Dispense Refill    dicyclomine (BENTYL) 20 MG tablet Take 20 mg by mouth every 6 hours      busPIRone (BUSPAR) 10 MG tablet Take 10 mg by mouth 3 times daily (Patient not taking: Reported on 2021)  hydrOXYzine (ATARAX) 50 MG tablet Take 50 mg by mouth 3 times daily as needed for Itching      fluticasone (VERAMYST) 27.5 MCG/SPRAY nasal spray 2 sprays by Each Nostril route daily      TRELEGY ELLIPTA 100-62.5-25 MCG/INH AEPB Inhale 1 puff into the lungs 2 times daily      albuterol (PROVENTIL) 2.5 MG/0.5ML NEBU nebulizer solution Take 0.5 mLs by nebulization every 6 hours as needed for Wheezing or Shortness of Breath 120 each 0    guaiFENesin (MUCINEX) 600 MG extended release tablet Take 1 tablet by mouth 2 times daily 30 tablet 0    diclofenac sodium (VOLTAREN) 1 % GEL Apply 2 g topically 4 times daily 2 Tube 3    ferrous sulfate (IRON 325) 325 (65 Fe) MG tablet Take 1 tablet by mouth 2 times daily (Patient not taking: Reported on 11/18/2021) 30 tablet 0    PARoxetine (PAXIL) 40 MG tablet Take 40 mg by mouth every morning (Patient not taking: Reported on 11/18/2021)      traZODone (DESYREL) 100 MG tablet Take 100 mg by mouth nightly      gabapentin (NEURONTIN) 300 MG capsule Take 600 mg by mouth three times daily.  albuterol sulfate  (90 Base) MCG/ACT inhaler Use 2 puffs 4 times daily for 7 days then as needed for wheezing. Dispense with Spacer and instruct in use. At patient's preference may use 60 dose MDI. May Sub Pro-Air or Proventil as needed per insurance. 1 Inhaler 0    zafirlukast (ACCOLATE) 20 MG tablet Take 20 mg by mouth 2 times daily.  esomeprazole Magnesium (NEXIUM) 40 MG PACK Take 40 mg by mouth daily.  atorvastatin (LIPITOR) 40 MG tablet Take 20 mg by mouth daily          Physical Exam:  Vitals:    02/27/22 0229   BP: 115/79   Pulse: 87   Resp: 19   Temp:    SpO2: 96%         Based on new provisions and guidance offered in setting of COVID 19 outbreak and in order to preserve personal protective equipment in accordance with the flexibilities announced by CMS limited examination is performed.     General: Well appearing, not diaphoretic, no acute distress  Head: Normocephalic, atraumatic  Eyes: No ocular discharge, no scleral injection, no icterus  Ears: Normal external auricles  Nose: No rhinorrhea, no epistaxis  Throat: Not examined, no difficulty swallowing  Pulm: No apparent respiratory distress  Cardio: Normal rate, regular rhythm, no peripheral edema  GI: non-distended  Neuro: Alert and oriented, cn2-12 grossly intact, strength 5/5 bilaterally. Psych: Cooperative  Skin: no jaundice    Assessment and Plan:   Patient Active Problem List   Diagnosis    Closed bicondylar fracture of distal femur, right, initial encounter ([facility] [address]    Closed fracture of right distal femur ([facility] [address]    Chronic obstructive pulmonary disease (HCC)    Narcotic dependence ([facility] [address]    COPD exacerbation (HCC)    SOB (shortness of breath)    Panlobular emphysema (HCC)    GERD (gastroesophageal reflux disease)    Former smoker    Elevated blood pressure reading     # copd exacerbation  # htn  # smoker    - bronchodilators, steroids  - prn antihypertensives  - Continue to monitor electrolytes and replete as appropriate  - Pt high risk for vte, lmwh for vte ppx.  - Continue medications for chronic problems  - eval 2300 on 2/26/22    I have personally reviewed pertinent laboratory, ekg and imaging results, as well as documentation in the patient's emr. Laboratory and imaging orders per the above plan have been addressed, see orders above.  Patient's case, assessment and plan have been discussed on this date with patient    Current Facility-Administered Medications: fluticasone (FLONASE) 50 MCG/ACT nasal spray 2 spray, 2 spray, Each Nostril, Daily  albuterol (PROVENTIL) nebulizer solution 2.5 mg, 2.5 mg, Nebulization, Q4H PRN  atorvastatin (LIPITOR) tablet 20 mg, 20 mg, Oral, Daily  busPIRone (BUSPAR) tablet 10 mg, 10 mg, Oral, TID  dicyclomine (BENTYL) tablet 20 mg, 20 mg, Oral, Q6H  ferrous sulfate (IRON 325) tablet 325 mg, 325 mg, Oral, BID  gabapentin (NEURONTIN) capsule 600 mg, 600 mg, Oral, TID  guaiFENesin (MUCINEX) extended release tablet 600 mg, 600 mg, Oral, BID  hydrOXYzine (ATARAX) tablet 50 mg, 50 mg, Oral, TID PRN  PARoxetine (PAXIL) tablet 40 mg, 40 mg, Oral, QAM  traZODone (DESYREL) tablet 100 mg, 100 mg, Oral, Nightly  zafirlukast (ACCOLATE) tablet 20 mg, 20 mg, Oral, BID  sodium chloride flush 0.9 % injection 5-40 mL, 5-40 mL, IntraVENous, 2 times per day  sodium chloride flush 0.9 % injection 5-40 mL, 5-40 mL, IntraVENous, PRN  0.9 % sodium chloride infusion, 25 mL, IntraVENous, PRN  ondansetron (ZOFRAN-ODT) disintegrating tablet 4 mg, 4 mg, Oral, Q8H PRN **OR** ondansetron (ZOFRAN) injection 4 mg, 4 mg, IntraVENous, Q6H PRN  polyethylene glycol (GLYCOLAX) packet 17 g, 17 g, Oral, Daily PRN  enoxaparin (LOVENOX) injection 40 mg, 40 mg, SubCUTAneous, Daily  acetaminophen (TYLENOL) tablet 650 mg, 650 mg, Oral, Q6H PRN **OR** acetaminophen (TYLENOL) suppository 650 mg, 650 mg, Rectal, Q6H PRN  ipratropium-albuterol (DUONEB) nebulizer solution 1 ampule, 1 ampule, Inhalation, Q4H WA  predniSONE (DELTASONE) tablet 40 mg, 40 mg, Oral, Daily  pantoprazole (PROTONIX) injection 40 mg, 40 mg, IntraVENous, Daily  budesonide-formoterol (SYMBICORT) 160-4.5 MCG/ACT inhaler 2 puff, 2 puff, Inhalation, BID  tiotropium (SPIRIVA RESPIMAT) 2.5 MCG/ACT inhaler 2 puff, 2 puff, Inhalation, Daily  albuterol (PROVENTIL) nebulizer solution 5 mg, 5 mg, Nebulization, Once   Orders Placed This Encounter   Procedures    Culture, Blood 2     Standing Status:   Standing     Number of Occurrences:   1    Culture, Blood 1     Standing Status:   Standing     Number of Occurrences:   1    COVID-19 & Influenza Combo     Standing Status:   Standing     Number of Occurrences:   1     Order Specific Question:   Is this test for diagnosis or screening? Answer:   Diagnosis of ill patient     Order Specific Question:   Symptomatic for COVID-19 as defined by CDC?      Answer:   Unknown     Order Specific Question:   Date of Symptom Onset     Answer:   N/A     Order Specific Question:   Hospitalized for COVID-19? Answer:   No     Order Specific Question:   Admitted to ICU for COVID-19? Answer:   Unknown     Order Specific Question:   Employed in healthcare setting? Answer:   Unknown     Order Specific Question:   Resident in a congregate (group) care setting? Answer:   Unknown     Order Specific Question:   Pregnant? Answer:   No     Order Specific Question:   Previously tested for COVID-19? Answer:    Yes    XR CHEST (2 VW)     Standing Status:   Standing     Number of Occurrences:   1     Order Specific Question:   Reason for exam:     Answer:   Chest Discomfort    CT CHEST PULMONARY EMBOLISM W CONTRAST     Standing Status:   Standing     Number of Occurrences:   1     Order Specific Question:   Reason for exam:     Answer:   sob on antibiotics     Order Specific Question:   Decision Support Exception - unselect if not a suspected or confirmed emergency medical condition     Answer:   Emergency Medical Condition (MA) [1]    CT ABDOMEN PELVIS W IV CONTRAST Additional Contrast? None     Standing Status:   Standing     Number of Occurrences:   1     Order Specific Question:   Additional Contrast?     Answer:   None     Order Specific Question:   Reason for exam:     Answer:   general abd pain     Order Specific Question:   Decision Support Exception - unselect if not a suspected or confirmed emergency medical condition     Answer:   Emergency Medical Condition (MA) [1]    CBC with Auto Differential     Standing Status:   Standing     Number of Occurrences:   1    Comprehensive Metabolic Panel w/ Reflex to MG     Standing Status:   Standing     Number of Occurrences:   1    Procalcitonin     Standing Status:   Standing     Number of Occurrences:   1    Lactate, Sepsis     Standing Status:   Standing     Number of Occurrences:   2    Urinalysis with Reflex to Culture     Standing Status:   Standing     Number of Occurrences:   1    Brain Natriuretic Peptide     Standing Status:   Standing     Number of Occurrences:   1    Troponin     Standing Status:   Standing     Number of Occurrences:   1    Blood gas, venous     Standing Status:   Standing     Number of Occurrences:   1    Basic Metabolic Panel w/ Reflex to MG     Standing Status:   Standing     Number of Occurrences:   1    CBC with Auto Differential     Standing Status:   Standing     Number of Occurrences:   1    ADULT DIET; Regular; Low Fat/Low Chol/High Fiber/2 gm Na     Standing Status:   Standing     Number of Occurrences:   1     Order Specific Question:   Primary Diet     Answer:   Regular     Order Specific Question:   Cardiac Restriction     Answer:   Low Fat/Low Chol/High Fiber/2 gm Na    Ambulate patient     Standing Status:   Standing     Number of Occurrences:   1    Vital signs per unit routine     Standing Status:   Standing     Number of Occurrences:   1    Up as tolerated     Standing Status:   Standing     Number of Occurrences:   26071    Place intermittent pneumatic compression device     Standing Status:   Standing     Number of Occurrences:   1    Full Code     Standing Status:   Standing     Number of Occurrences:   1    Inpatient consult to Hospitalist     Standing Status:   Standing     Number of Occurrences:   1     Order Specific Question:   Reason for Consult? Answer:   failed walk test- tachy 130 tachypneic 40 no desats but very sobe    Pulse Oximetry Spot Check     Standing Status:   Standing     Number of Occurrences:   1    EKG 12 Lead     Standing Status:   Standing     Number of Occurrences:   1     Order Specific Question:   Reason for Exam?     Answer:   Shortness of Breath     Order Specific Question:   Reason for Exam?     Answer: Other     Order Specific Question:   Reason for Exam?     Answer:   Shortness of Breath     Order Specific Question:   Reason for Exam?     Answer:    Other    Saline lock IV Standing Status:   Standing     Number of Occurrences:   1    Place in Observation Service     Standing Status:   Standing     Number of Occurrences:   1     Order Specific Question:   Discharge Plan:     Answer:    Other/Uknown (Specify)

## 2022-02-28 PROBLEM — M79.7 FIBROMYALGIA: Status: ACTIVE | Noted: 2022-02-28

## 2022-02-28 PROBLEM — R09.02 HYPOXIA: Status: ACTIVE | Noted: 2022-02-28

## 2022-02-28 PROBLEM — F41.9 ANXIETY: Status: ACTIVE | Noted: 2022-02-28

## 2022-02-28 LAB
ANION GAP SERPL CALCULATED.3IONS-SCNC: 12 MMOL/L (ref 3–16)
BASOPHILS ABSOLUTE: 0.1 K/UL (ref 0–0.2)
BASOPHILS RELATIVE PERCENT: 0.5 %
BILIRUBIN URINE: NEGATIVE
BLOOD, URINE: NEGATIVE
BUN BLDV-MCNC: 18 MG/DL (ref 7–20)
CALCIUM SERPL-MCNC: 9.4 MG/DL (ref 8.3–10.6)
CHLORIDE BLD-SCNC: 103 MMOL/L (ref 99–110)
CLARITY: CLEAR
CO2: 27 MMOL/L (ref 21–32)
COLOR: YELLOW
CREAT SERPL-MCNC: 0.8 MG/DL (ref 0.6–1.2)
EOSINOPHILS ABSOLUTE: 0.1 K/UL (ref 0–0.6)
EOSINOPHILS RELATIVE PERCENT: 1 %
GFR AFRICAN AMERICAN: >60
GFR NON-AFRICAN AMERICAN: >60
GLUCOSE BLD-MCNC: 111 MG/DL (ref 70–99)
GLUCOSE URINE: NEGATIVE MG/DL
HCT VFR BLD CALC: 36.1 % (ref 36–48)
HEMOGLOBIN: 12.1 G/DL (ref 12–16)
KETONES, URINE: NEGATIVE MG/DL
LEUKOCYTE ESTERASE, URINE: NEGATIVE
LYMPHOCYTES ABSOLUTE: 3 K/UL (ref 1–5.1)
LYMPHOCYTES RELATIVE PERCENT: 25.9 %
MCH RBC QN AUTO: 28.5 PG (ref 26–34)
MCHC RBC AUTO-ENTMCNC: 33.5 G/DL (ref 31–36)
MCV RBC AUTO: 85 FL (ref 80–100)
MICROSCOPIC EXAMINATION: NORMAL
MONOCYTES ABSOLUTE: 1.1 K/UL (ref 0–1.3)
MONOCYTES RELATIVE PERCENT: 9.6 %
NEUTROPHILS ABSOLUTE: 7.4 K/UL (ref 1.7–7.7)
NEUTROPHILS RELATIVE PERCENT: 63 %
NITRITE, URINE: NEGATIVE
PDW BLD-RTO: 14.4 % (ref 12.4–15.4)
PH UA: 6 (ref 5–8)
PLATELET # BLD: 320 K/UL (ref 135–450)
PMV BLD AUTO: 7 FL (ref 5–10.5)
POTASSIUM REFLEX MAGNESIUM: 5 MMOL/L (ref 3.5–5.1)
PROTEIN UA: NEGATIVE MG/DL
RBC # BLD: 4.25 M/UL (ref 4–5.2)
SODIUM BLD-SCNC: 142 MMOL/L (ref 136–145)
SPECIFIC GRAVITY UA: 1.02 (ref 1–1.03)
URINE REFLEX TO CULTURE: NORMAL
URINE TYPE: NORMAL
UROBILINOGEN, URINE: 0.2 E.U./DL
WBC # BLD: 11.7 K/UL (ref 4–11)

## 2022-02-28 PROCEDURE — 36415 COLL VENOUS BLD VENIPUNCTURE: CPT

## 2022-02-28 PROCEDURE — 96376 TX/PRO/DX INJ SAME DRUG ADON: CPT

## 2022-02-28 PROCEDURE — 96372 THER/PROPH/DIAG INJ SC/IM: CPT

## 2022-02-28 PROCEDURE — 81003 URINALYSIS AUTO W/O SCOPE: CPT

## 2022-02-28 PROCEDURE — 80048 BASIC METABOLIC PNL TOTAL CA: CPT

## 2022-02-28 PROCEDURE — 2580000003 HC RX 258: Performed by: FAMILY MEDICINE

## 2022-02-28 PROCEDURE — 6360000002 HC RX W HCPCS: Performed by: FAMILY MEDICINE

## 2022-02-28 PROCEDURE — 6370000000 HC RX 637 (ALT 250 FOR IP): Performed by: FAMILY MEDICINE

## 2022-02-28 PROCEDURE — 1200000000 HC SEMI PRIVATE

## 2022-02-28 PROCEDURE — 6370000000 HC RX 637 (ALT 250 FOR IP)

## 2022-02-28 PROCEDURE — 94761 N-INVAS EAR/PLS OXIMETRY MLT: CPT

## 2022-02-28 PROCEDURE — C9113 INJ PANTOPRAZOLE SODIUM, VIA: HCPCS | Performed by: FAMILY MEDICINE

## 2022-02-28 PROCEDURE — 99232 SBSQ HOSP IP/OBS MODERATE 35: CPT | Performed by: INTERNAL MEDICINE

## 2022-02-28 PROCEDURE — 2700000000 HC OXYGEN THERAPY PER DAY

## 2022-02-28 PROCEDURE — 85025 COMPLETE CBC W/AUTO DIFF WBC: CPT

## 2022-02-28 PROCEDURE — 94640 AIRWAY INHALATION TREATMENT: CPT

## 2022-02-28 RX ORDER — MONTELUKAST SODIUM 10 MG/1
10 TABLET ORAL NIGHTLY
Status: DISCONTINUED | OUTPATIENT
Start: 2022-02-28 | End: 2022-03-03 | Stop reason: HOSPADM

## 2022-02-28 RX ADMIN — FLUTICASONE PROPIONATE 2 SPRAY: 50 SPRAY, METERED NASAL at 08:20

## 2022-02-28 RX ADMIN — PANTOPRAZOLE SODIUM 40 MG: 40 INJECTION, POWDER, FOR SOLUTION INTRAVENOUS at 08:19

## 2022-02-28 RX ADMIN — SODIUM CHLORIDE, PRESERVATIVE FREE 10 ML: 5 INJECTION INTRAVENOUS at 08:19

## 2022-02-28 RX ADMIN — FERROUS SULFATE TAB 325 MG (65 MG ELEMENTAL FE) 325 MG: 325 (65 FE) TAB at 19:59

## 2022-02-28 RX ADMIN — Medication 2 PUFF: at 07:24

## 2022-02-28 RX ADMIN — Medication 2 PUFF: at 18:51

## 2022-02-28 RX ADMIN — ENOXAPARIN SODIUM 40 MG: 100 INJECTION SUBCUTANEOUS at 08:20

## 2022-02-28 RX ADMIN — GUAIFENESIN 600 MG: 600 TABLET, EXTENDED RELEASE ORAL at 19:59

## 2022-02-28 RX ADMIN — GUAIFENESIN 600 MG: 600 TABLET, EXTENDED RELEASE ORAL at 08:19

## 2022-02-28 RX ADMIN — BUSPIRONE HYDROCHLORIDE 10 MG: 10 TABLET ORAL at 19:59

## 2022-02-28 RX ADMIN — ACETAMINOPHEN 650 MG: 325 TABLET ORAL at 14:44

## 2022-02-28 RX ADMIN — FERROUS SULFATE TAB 325 MG (65 MG ELEMENTAL FE) 325 MG: 325 (65 FE) TAB at 08:20

## 2022-02-28 RX ADMIN — TRAZODONE HYDROCHLORIDE 100 MG: 100 TABLET ORAL at 19:59

## 2022-02-28 RX ADMIN — GABAPENTIN 600 MG: 300 CAPSULE ORAL at 19:59

## 2022-02-28 RX ADMIN — IPRATROPIUM BROMIDE AND ALBUTEROL SULFATE 1 AMPULE: 2.5; .5 SOLUTION RESPIRATORY (INHALATION) at 18:50

## 2022-02-28 RX ADMIN — IPRATROPIUM BROMIDE AND ALBUTEROL SULFATE 1 AMPULE: 2.5; .5 SOLUTION RESPIRATORY (INHALATION) at 11:01

## 2022-02-28 RX ADMIN — TIOTROPIUM BROMIDE INHALATION SPRAY 2 PUFF: 3.12 SPRAY, METERED RESPIRATORY (INHALATION) at 07:25

## 2022-02-28 RX ADMIN — IPRATROPIUM BROMIDE AND ALBUTEROL SULFATE 1 AMPULE: 2.5; .5 SOLUTION RESPIRATORY (INHALATION) at 07:16

## 2022-02-28 RX ADMIN — BUSPIRONE HYDROCHLORIDE 10 MG: 10 TABLET ORAL at 08:20

## 2022-02-28 RX ADMIN — ACETAMINOPHEN 650 MG: 325 TABLET ORAL at 08:24

## 2022-02-28 RX ADMIN — IPRATROPIUM BROMIDE AND ALBUTEROL SULFATE 1 AMPULE: 2.5; .5 SOLUTION RESPIRATORY (INHALATION) at 15:19

## 2022-02-28 RX ADMIN — ATORVASTATIN CALCIUM 20 MG: 10 TABLET, FILM COATED ORAL at 08:20

## 2022-02-28 RX ADMIN — GABAPENTIN 600 MG: 300 CAPSULE ORAL at 08:19

## 2022-02-28 RX ADMIN — PREDNISONE 40 MG: 20 TABLET ORAL at 08:20

## 2022-02-28 RX ADMIN — MONTELUKAST SODIUM 10 MG: 10 TABLET, COATED ORAL at 19:59

## 2022-02-28 RX ADMIN — BUSPIRONE HYDROCHLORIDE 10 MG: 10 TABLET ORAL at 13:19

## 2022-02-28 RX ADMIN — SODIUM CHLORIDE, PRESERVATIVE FREE 10 ML: 5 INJECTION INTRAVENOUS at 19:59

## 2022-02-28 RX ADMIN — IPRATROPIUM BROMIDE AND ALBUTEROL SULFATE 1 AMPULE: 2.5; .5 SOLUTION RESPIRATORY (INHALATION) at 22:41

## 2022-02-28 RX ADMIN — GABAPENTIN 600 MG: 300 CAPSULE ORAL at 13:18

## 2022-02-28 ASSESSMENT — PAIN DESCRIPTION - PAIN TYPE: TYPE: ACUTE PAIN

## 2022-02-28 ASSESSMENT — PAIN DESCRIPTION - PROGRESSION: CLINICAL_PROGRESSION: GRADUALLY WORSENING

## 2022-02-28 ASSESSMENT — PAIN SCALES - GENERAL
PAINLEVEL_OUTOF10: 0
PAINLEVEL_OUTOF10: 5
PAINLEVEL_OUTOF10: 6
PAINLEVEL_OUTOF10: 0

## 2022-02-28 ASSESSMENT — PAIN DESCRIPTION - ONSET: ONSET: ON-GOING

## 2022-02-28 ASSESSMENT — PAIN DESCRIPTION - LOCATION: LOCATION: BACK

## 2022-02-28 ASSESSMENT — PAIN DESCRIPTION - ORIENTATION: ORIENTATION: LOWER

## 2022-02-28 ASSESSMENT — PAIN DESCRIPTION - DESCRIPTORS: DESCRIPTORS: ACHING

## 2022-02-28 ASSESSMENT — PAIN DESCRIPTION - FREQUENCY: FREQUENCY: CONTINUOUS

## 2022-02-28 NOTE — PROGRESS NOTES
Bronchodilator order with Frequency of every 4 hours PRN for wheezing or increased work of breathing using Per Protocol order mode. 4-6 - enter or revise RT Bronchodilator order(s) to two equivalent RT bronchodilator orders with one order with BID Frequency and one order with Frequency of every 4 hours PRN wheezing or increased work of breathing using Per Protocol order mode. 7-10 - enter or revise RT Bronchodilator order(s) to two equivalent RT bronchodilator orders with one order with TID Frequency and one order with Frequency of every 4 hours PRN wheezing or increased work of breathing using Per Protocol order mode. 11-13 - enter or revise RT Bronchodilator order(s) to one equivalent RT bronchodilator order with QID Frequency and an Albuterol order with Frequency of every 4 hours PRN wheezing or increased work of breathing using Per Protocol order mode. Greater than 13 - enter or revise RT Bronchodilator order(s) to one equivalent RT bronchodilator order with every 4 hours Frequency and an Albuterol order with Frequency of every 2 hours PRN wheezing or increased work of breathing using Per Protocol order mode.          Electronically signed by Frantz Avila RCP on 2/28/2022 at 7:59 AM

## 2022-02-28 NOTE — PROGRESS NOTES
Progress Note    Admit Date:  2/26/2022    73year old female with hyperlipidemia, fibromyalgia, DDD, and COPD that presented to [facility] with shortness of breath. Admitted for COPD exacerbation. Subjective:  Ms. Murphy Arango states she feels more short of breath with exertion and her HR goes up. SpO2 currently stable on RA. She does desat to 91% on RA after coughing. Wearing 2 L as needed. Objective:   Patient Vitals for the past 4 hrs:   BP Temp Temp src Pulse Resp SpO2   02/28/22 0730 -- -- -- 87 -- --   02/28/22 0724 116/68 98.3 °F (36.8 °C) Oral 87 16 91 %   02/28/22 0716 -- -- -- -- 18 93 %            Intake/Output Summary (Last 24 hours) at 2/28/2022 0823  Last data filed at 2/27/2022 1340  Gross per 24 hour   Intake 240 ml   Output --   Net 240 ml       Physical Exam:  Gen: No distress. Alert. Eyes: PERRL. No sclera icterus. No conjunctival injection. ENT: No discharge. Pharynx clear. Neck: Trachea midline. Resp: No accessory muscle use. No crackles. LS diminished with wheezes. No rhonchi. CV: Regular rate. Regular rhythm. No murmur. No rub. No edema. Capillary Refill: Brisk,< 3 seconds   Peripheral Pulses: +2 palpable, equal bilaterally   GI: Non-tender. Non-distended. Normal bowel sounds. No hernia. Skin: Warm and dry. No nodule on exposed extremities. No rash on exposed extremities. M/S: No cyanosis. No joint deformity. No clubbing. Neuro: Awake. Grossly nonfocal    Psych: Oriented x 3. No anxiety or agitation    Data:  CBC:   Recent Labs     02/26/22  1807 02/28/22  0605   WBC 11.6* 11.7*   HGB 14.0 12.1   HCT 42.3 36.1   MCV 84.8 85.0    320     BMP:   Recent Labs     02/26/22  1807 02/28/22  0605   * 142   K 3.7 5.0    103   CO2 22 27   BUN 16 18   CREATININE 0.7 0.8     LIVER PROFILE:   Recent Labs     02/26/22 1807   AST 17   ALT 18   BILITOT 0.6   ALKPHOS 110     PT/INR: No results for input(s): PROTIME, INR in the last 72 hours.     CULTURES  Blood: NGTD  COVID/influenza: not detected    RADIOLOGY  CT ABDOMEN PELVIS W IV CONTRAST Additional Contrast? None   Final Result   CT PA:      No evidence of pulmonary embolism or aortic dissection. Diffuse bronchial wall thickening. Filling defects are noted in the lower   lung airways bilaterally, greater on the right. Differential considerations   include mucous plugging and aspiration. Emphysema. Abdomen and pelvis CT:      Extensive diverticulosis of the large bowel, especially the region of the   sigmoid colon, but without convincing CT evidence of diverticulitis. Status post cholecystectomy. CT CHEST PULMONARY EMBOLISM W CONTRAST   Final Result   CT PA:      No evidence of pulmonary embolism or aortic dissection. Diffuse bronchial wall thickening. Filling defects are noted in the lower   lung airways bilaterally, greater on the right. Differential considerations   include mucous plugging and aspiration. Emphysema. Abdomen and pelvis CT:      Extensive diverticulosis of the large bowel, especially the region of the   sigmoid colon, but without convincing CT evidence of diverticulitis. Status post cholecystectomy. XR CHEST (2 VW)   Final Result   Hyperinflated, clear lungs. Edenilson Lopez MD have reviewed the chart on [place] and personally interviewed and examined patient, reviewed the data (labs and imaging) and after discussion with my PA formulated the plan. Agree with note with the following edits. HPI:     Admitted for COPD exacerbation. She is a former smoker not on home oxygen. Feels more dyspneic with exertion. Feeling tired. No fever. I reviewed the patient's Past Medical History, Past Surgical History, Medications, and Allergies.      Physical exam:    /68   Pulse 87   Temp 98.3 °F (36.8 °C) (Oral)   Resp 16   Ht 5' 5\" (1.651 m)   Wt 129 lb 6 oz (58.7 kg)   SpO2 90%   BMI 21.53 kg/m²     Gen: Minimal distress. Alert. Eyes: PERRL. No sclera icterus. No conjunctival injection. ENT: No discharge. Pharynx clear. Neck: Trachea midline. Normal thyroid. Resp: No accessory muscle use. no crackles. + wheezes. No rhonchi. No dullness on percussion. CV: Regular rate. Regular rhythm. No murmur or rub. No edema. Assessment/Plan:  #COPD exacerbation  #Hypoxia  - admitted to med-surg.    - 2 L O2. Wean as tolerated. - Desats with coughing and exertion  - cont Symbicort, Spiriva, Mucinex  - Prednisone, Duonebs    #Elevated BP without diagnosis of Hypertension  - most likely related to above  - improved now. Not on any medications. #Fibromyalgia  #DDD  - cont Gabapentin    #GERD  - IV PPI    #Hyperlipidemia  - on Atorvastatin    #Iron deficiency anemia  - cont ferrous sulfate    #Anxiety  - cont Buspar, Atarax. DVT Prophylaxis: Lovenox  Diet: ADULT DIET;  Regular; Low Fat/Low Chol/High Fiber/2 gm Na  Code Status: Full Code    Jaja WHEATLEY-C  2/28/2022       Milla Rodriguez MD 2/28/2022 10:28 AM

## 2022-02-28 NOTE — PROGRESS NOTES
RT Inhaler-Nebulizer Bronchodilator Protocol Note    There is a bronchodilator order in the chart from a provider indicating to follow the RT Bronchodilator Protocol and there is an Initiate RT Inhaler-Nebulizer Bronchodilator Protocol order as well (see protocol at bottom of note). CXR Findings:  No results found. The findings from the last RT Protocol Assessment were as follows:   History Pulmonary Disease: (P) Chronic pulmonary disease  Respiratory Pattern: (P) Dyspnea on exertion or RR 21-25 bpm  Breath Sounds: (P) Inspiratory and expiratory or bilateral wheezing and/or rhonchi  Cough: (P) Strong, spontaneous, non-productive  Indication for Bronchodilator Therapy: (P) Wheezing associated with pulm disorder  Bronchodilator Assessment Score: (P) 10    Aerosolized bronchodilator medication orders have been revised according to the RT Inhaler-Nebulizer Bronchodilator Protocol below. Respiratory Therapist to perform RT Therapy Protocol Assessment initially then follow the protocol. Repeat RT Therapy Protocol Assessment PRN for score 0-3 or on second treatment, BID, and PRN for scores above 3. No Indications - adjust the frequency to every 6 hours PRN wheezing or bronchospasm, if no treatments needed after 48 hours then discontinue using Per Protocol order mode. If indication present, adjust the RT bronchodilator orders based on the Bronchodilator Assessment Score as indicated below. Use Inhaler orders unless patient has one or more of the following: on home nebulizer, not able to hold breath for 10 seconds, is not alert and oriented, cannot activate and use MDI correctly, or respiratory rate 25 breaths per minute or more, then use the equivalent nebulizer order(s) with same Frequency and PRN reasons based on the score. If a patient is on this medication at home then do not decrease Frequency below that used at home.     0-3 - enter or revise RT bronchodilator order(s) to equivalent RT Bronchodilator order with Frequency of every 4 hours PRN for wheezing or increased work of breathing using Per Protocol order mode. 4-6 - enter or revise RT Bronchodilator order(s) to two equivalent RT bronchodilator orders with one order with BID Frequency and one order with Frequency of every 4 hours PRN wheezing or increased work of breathing using Per Protocol order mode. 7-10 - enter or revise RT Bronchodilator order(s) to two equivalent RT bronchodilator orders with one order with TID Frequency and one order with Frequency of every 4 hours PRN wheezing or increased work of breathing using Per Protocol order mode. 11-13 - enter or revise RT Bronchodilator order(s) to one equivalent RT bronchodilator order with QID Frequency and an Albuterol order with Frequency of every 4 hours PRN wheezing or increased work of breathing using Per Protocol order mode. Greater than 13 - enter or revise RT Bronchodilator order(s) to one equivalent RT bronchodilator order with every 4 hours Frequency and an Albuterol order with Frequency of every 2 hours PRN wheezing or increased work of breathing using Per Protocol order mode.        Electronically signed by Natasha Khalil RCP on 2/28/2022 at 6:59 PM

## 2022-02-28 NOTE — PROGRESS NOTES
Bedside report given to [facility]  pt in stable condition no needs at this time.  Call light within reach

## 2022-02-28 NOTE — PROGRESS NOTES
02/27/22 2200   RT Protocol   History Pulmonary Disease 2   Respiratory pattern 4   Breath sounds 6   Cough 0   Indications for Bronchodilator Therapy Wheezing associated with pulm disorder   Bronchodilator Assessment Score 12   RT Inhaler-Nebulizer Bronchodilator Protocol Note    There is a bronchodilator order in the chart from a provider indicating to follow the RT Bronchodilator Protocol and there is an Initiate RT Inhaler-Nebulizer Bronchodilator Protocol order as well (see protocol at bottom of note). CXR Findings:  No results found. The findings from the last RT Protocol Assessment were as follows:   History Pulmonary Disease: Chronic pulmonary disease  Respiratory Pattern: Mild dyspnea at rest, irregular pattern, or RR 21-25 bpm  Breath Sounds: Inspiratory and expiratory or bilateral wheezing and/or rhonchi  Cough: Strong, spontaneous, non-productive  Indication for Bronchodilator Therapy: Wheezing associated with pulm disorder  Bronchodilator Assessment Score: 12    Aerosolized bronchodilator medication orders have been revised according to the RT Inhaler-Nebulizer Bronchodilator Protocol below. Respiratory Therapist to perform RT Therapy Protocol Assessment initially then follow the protocol. Repeat RT Therapy Protocol Assessment PRN for score 0-3 or on second treatment, BID, and PRN for scores above 3. No Indications - adjust the frequency to every 6 hours PRN wheezing or bronchospasm, if no treatments needed after 48 hours then discontinue using Per Protocol order mode. If indication present, adjust the RT bronchodilator orders based on the Bronchodilator Assessment Score as indicated below.   Use Inhaler orders unless patient has one or more of the following: on home nebulizer, not able to hold breath for 10 seconds, is not alert and oriented, cannot activate and use MDI correctly, or respiratory rate 25 breaths per minute or more, then use the equivalent nebulizer order(s) with same Frequency and PRN reasons based on the score. If a patient is on this medication at home then do not decrease Frequency below that used at home. 0-3 - enter or revise RT bronchodilator order(s) to equivalent RT Bronchodilator order with Frequency of every 4 hours PRN for wheezing or increased work of breathing using Per Protocol order mode. 4-6 - enter or revise RT Bronchodilator order(s) to two equivalent RT bronchodilator orders with one order with BID Frequency and one order with Frequency of every 4 hours PRN wheezing or increased work of breathing using Per Protocol order mode. 7-10 - enter or revise RT Bronchodilator order(s) to two equivalent RT bronchodilator orders with one order with TID Frequency and one order with Frequency of every 4 hours PRN wheezing or increased work of breathing using Per Protocol order mode. 11-13 - enter or revise RT Bronchodilator order(s) to one equivalent RT bronchodilator order with QID Frequency and an Albuterol order with Frequency of every 4 hours PRN wheezing or increased work of breathing using Per Protocol order mode. Greater than 13 - enter or revise RT Bronchodilator order(s) to one equivalent RT bronchodilator order with every 4 hours Frequency and an Albuterol order with Frequency of every 2 hours PRN wheezing or increased work of breathing using Per Protocol order mode.      Electronically signed by Sandra Acharya RCP on 2/27/2022 at 10:53 PM

## 2022-02-28 NOTE — PROGRESS NOTES
C/o lower back pain rate as 5/10. Requesting pain medication. Tylenol 650 mg po given. See mar. Call light in reach.

## 2022-02-28 NOTE — FLOWSHEET NOTE
02/27/22 2033   Vital Signs   Temp 98.5 °F (36.9 °C)   Temp Source Oral   Pulse 89   Heart Rate Source Monitor   Resp 18   /64   BP Location Left upper arm   Patient Position Semi fowlers   Level of Consciousness Alert (0)   MEWS Score 1   Oxygen Therapy   SpO2 95 %   O2 Device Nasal cannula   O2 Flow Rate (L/min) 1 L/min   Pt A/O assessment completed. Meds given per MAR. Pt denies any needs.  Call light within reach

## 2022-02-28 NOTE — PLAN OF CARE
Problem: Infection:  Goal: Will remain free from infection  Description: Will remain free from infection  2/27/2022 8615 by Deedee Mckenzie RN  Outcome: Ongoing     Problem: Safety:  Goal: Free from accidental physical injury  Description: Free from accidental physical injury  2/27/2022 4965 by Deedee Mckenzie RN  Outcome: Ongoing  Goal: Free from intentional harm  Description: Free from intentional harm  2/27/2022 8017 by Deedee Mckenzie RN  Outcome: Ongoing     Problem: Daily Care:  Goal: Daily care needs are met  Description: Daily care needs are met  2/27/2022 2205 by Ezequiel Dean RN  Outcome: Ongoing  2/27/2022 0822 by Deedee Mckenzie RN  Outcome: Ongoing     Problem: Pain:  Goal: Patient's pain/discomfort is manageable  Description: Patient's pain/discomfort is manageable  2/27/2022 2205 by Ezequiel Dean RN  Outcome: Ongoing  2/27/2022 0822 by Deedee Mckenzie RN  Outcome: Ongoing     Problem: Skin Integrity:  Goal: Skin integrity will stabilize  Description: Skin integrity will stabilize  2/27/2022 2205 by Ezequiel Dean RN  Outcome: Ongoing  2/27/2022 0822 by Deedee Mckenzie RN  Outcome: Ongoing     Problem: Discharge Planning:  Goal: Patients continuum of care needs are met  Description: Patients continuum of care needs are met  2/27/2022 2205 by Ezequiel Dean RN  Outcome: Ongoing  2/27/2022 0822 by Deedee Mckenzie RN  Outcome: Ongoing

## 2022-02-28 NOTE — FLOWSHEET NOTE
02/28/22 0135   Vital Signs   Temp 98 °F (36.7 °C)   Temp Source Oral   Pulse 86   Heart Rate Source Monitor   Resp 18   /66   BP Location Right upper arm   Patient Position Lying left side   Level of Consciousness Alert (0)   MEWS Score 1   Oxygen Therapy   SpO2 95 %   O2 Device Nasal cannula   O2 Flow Rate (L/min) 1 L/min   Pt awake at this time. Vitals completed and BSC emptied. Pt provided with warm blanket and denies any other needs.  Call light within reach 20-Jan-2020

## 2022-02-28 NOTE — CARE COORDINATION
INTERDISCIPLINARY PLAN OF CARE CONFERENCE    Date/Time: 2/28/2022 10:22 AM  Completed by: Portia Cisneros RN, Case Management      Patient Name:  Gerline Ormond  YOB: 1955  Admitting Diagnosis: Diverticulosis [K57.90]  Dyspnea on exertion [R06.00]  COPD exacerbation ([facility] [address] [J44.1]  Nausea and vomiting, intractability of vomiting not specified, unspecified vomiting type [R11.2]     Admit Date/Time:  2/26/2022  5:51 PM    Chart reviewed. Interdisciplinary team contacted or reviewed plan related to patient progress and discharge plans. Disciplines included Case Management, Nursing, and Dietitian. Current Status: IP 02/28/2022  PT/OT recommendation for discharge plan of care: NA    Expected D/C Disposition:  Home    Discharge Plan Comments: IPTA. Lives w/ family. SOB and desats with exertion. CM following for possible home O2. No other DC needs identified.      Home O2 in place on admit: No

## 2022-03-01 PROCEDURE — 2580000003 HC RX 258: Performed by: FAMILY MEDICINE

## 2022-03-01 PROCEDURE — 6360000002 HC RX W HCPCS: Performed by: PHYSICIAN ASSISTANT

## 2022-03-01 PROCEDURE — 6360000002 HC RX W HCPCS: Performed by: FAMILY MEDICINE

## 2022-03-01 PROCEDURE — 2700000000 HC OXYGEN THERAPY PER DAY

## 2022-03-01 PROCEDURE — 99233 SBSQ HOSP IP/OBS HIGH 50: CPT | Performed by: INTERNAL MEDICINE

## 2022-03-01 PROCEDURE — 6370000000 HC RX 637 (ALT 250 FOR IP): Performed by: PHYSICIAN ASSISTANT

## 2022-03-01 PROCEDURE — 6370000000 HC RX 637 (ALT 250 FOR IP)

## 2022-03-01 PROCEDURE — 94761 N-INVAS EAR/PLS OXIMETRY MLT: CPT

## 2022-03-01 PROCEDURE — 6370000000 HC RX 637 (ALT 250 FOR IP): Performed by: FAMILY MEDICINE

## 2022-03-01 PROCEDURE — 87070 CULTURE OTHR SPECIMN AEROBIC: CPT

## 2022-03-01 PROCEDURE — 1200000000 HC SEMI PRIVATE

## 2022-03-01 PROCEDURE — 87205 SMEAR GRAM STAIN: CPT

## 2022-03-01 PROCEDURE — 94640 AIRWAY INHALATION TREATMENT: CPT

## 2022-03-01 RX ORDER — TIZANIDINE 4 MG/1
4 TABLET ORAL ONCE
Status: COMPLETED | OUTPATIENT
Start: 2022-03-01 | End: 2022-03-01

## 2022-03-01 RX ORDER — TIZANIDINE 4 MG/1
4 TABLET ORAL EVERY 6 HOURS PRN
Status: DISCONTINUED | OUTPATIENT
Start: 2022-03-01 | End: 2022-03-03 | Stop reason: HOSPADM

## 2022-03-01 RX ORDER — PANTOPRAZOLE SODIUM 40 MG/1
40 TABLET, DELAYED RELEASE ORAL
Status: DISCONTINUED | OUTPATIENT
Start: 2022-03-01 | End: 2022-03-03 | Stop reason: HOSPADM

## 2022-03-01 RX ORDER — DOXYCYCLINE HYCLATE 100 MG
100 TABLET ORAL EVERY 12 HOURS SCHEDULED
Status: DISCONTINUED | OUTPATIENT
Start: 2022-03-01 | End: 2022-03-03 | Stop reason: HOSPADM

## 2022-03-01 RX ORDER — ALBUTEROL SULFATE 2.5 MG/3ML
2.5 SOLUTION RESPIRATORY (INHALATION)
Status: DISCONTINUED | OUTPATIENT
Start: 2022-03-01 | End: 2022-03-03 | Stop reason: HOSPADM

## 2022-03-01 RX ORDER — METHYLPREDNISOLONE SODIUM SUCCINATE 40 MG/ML
40 INJECTION, POWDER, LYOPHILIZED, FOR SOLUTION INTRAMUSCULAR; INTRAVENOUS EVERY 12 HOURS
Status: DISCONTINUED | OUTPATIENT
Start: 2022-03-01 | End: 2022-03-03 | Stop reason: HOSPADM

## 2022-03-01 RX ADMIN — GUAIFENESIN 600 MG: 600 TABLET, EXTENDED RELEASE ORAL at 09:32

## 2022-03-01 RX ADMIN — DOXYCYCLINE HYCLATE 100 MG: 100 TABLET, COATED ORAL at 09:32

## 2022-03-01 RX ADMIN — ONDANSETRON HYDROCHLORIDE 4 MG: 2 INJECTION, SOLUTION INTRAMUSCULAR; INTRAVENOUS at 10:32

## 2022-03-01 RX ADMIN — IPRATROPIUM BROMIDE AND ALBUTEROL SULFATE 1 AMPULE: 2.5; .5 SOLUTION RESPIRATORY (INHALATION) at 23:05

## 2022-03-01 RX ADMIN — POLYETHYLENE GLYCOL 3350 17 G: 17 POWDER, FOR SOLUTION ORAL at 20:46

## 2022-03-01 RX ADMIN — GABAPENTIN 600 MG: 300 CAPSULE ORAL at 20:39

## 2022-03-01 RX ADMIN — FERROUS SULFATE TAB 325 MG (65 MG ELEMENTAL FE) 325 MG: 325 (65 FE) TAB at 09:32

## 2022-03-01 RX ADMIN — IPRATROPIUM BROMIDE AND ALBUTEROL SULFATE 1 AMPULE: 2.5; .5 SOLUTION RESPIRATORY (INHALATION) at 16:08

## 2022-03-01 RX ADMIN — BUSPIRONE HYDROCHLORIDE 10 MG: 10 TABLET ORAL at 09:32

## 2022-03-01 RX ADMIN — TIZANIDINE 4 MG: 4 TABLET ORAL at 10:32

## 2022-03-01 RX ADMIN — TRAZODONE HYDROCHLORIDE 100 MG: 100 TABLET ORAL at 20:39

## 2022-03-01 RX ADMIN — DOXYCYCLINE HYCLATE 100 MG: 100 TABLET, COATED ORAL at 20:39

## 2022-03-01 RX ADMIN — ACETAMINOPHEN 650 MG: 325 TABLET ORAL at 12:47

## 2022-03-01 RX ADMIN — BUSPIRONE HYDROCHLORIDE 10 MG: 10 TABLET ORAL at 14:42

## 2022-03-01 RX ADMIN — ATORVASTATIN CALCIUM 20 MG: 10 TABLET, FILM COATED ORAL at 09:32

## 2022-03-01 RX ADMIN — SODIUM CHLORIDE, PRESERVATIVE FREE 10 ML: 5 INJECTION INTRAVENOUS at 09:33

## 2022-03-01 RX ADMIN — IPRATROPIUM BROMIDE AND ALBUTEROL SULFATE 1 AMPULE: 2.5; .5 SOLUTION RESPIRATORY (INHALATION) at 08:17

## 2022-03-01 RX ADMIN — Medication 2 PUFF: at 18:56

## 2022-03-01 RX ADMIN — IPRATROPIUM BROMIDE AND ALBUTEROL SULFATE 1 AMPULE: 2.5; .5 SOLUTION RESPIRATORY (INHALATION) at 18:56

## 2022-03-01 RX ADMIN — FERROUS SULFATE TAB 325 MG (65 MG ELEMENTAL FE) 325 MG: 325 (65 FE) TAB at 20:40

## 2022-03-01 RX ADMIN — FLUTICASONE PROPIONATE 2 SPRAY: 50 SPRAY, METERED NASAL at 09:33

## 2022-03-01 RX ADMIN — GABAPENTIN 600 MG: 300 CAPSULE ORAL at 14:42

## 2022-03-01 RX ADMIN — ACETAMINOPHEN 650 MG: 325 TABLET ORAL at 02:04

## 2022-03-01 RX ADMIN — METHYLPREDNISOLONE SODIUM SUCCINATE 40 MG: 40 INJECTION, POWDER, FOR SOLUTION INTRAMUSCULAR; INTRAVENOUS at 20:40

## 2022-03-01 RX ADMIN — ONDANSETRON 4 MG: 4 TABLET, ORALLY DISINTEGRATING ORAL at 20:51

## 2022-03-01 RX ADMIN — TIZANIDINE 4 MG: 4 TABLET ORAL at 20:40

## 2022-03-01 RX ADMIN — PANTOPRAZOLE SODIUM 40 MG: 40 TABLET, DELAYED RELEASE ORAL at 09:32

## 2022-03-01 RX ADMIN — ALBUTEROL SULFATE 2.5 MG: 2.5 SOLUTION RESPIRATORY (INHALATION) at 09:56

## 2022-03-01 RX ADMIN — SODIUM CHLORIDE, PRESERVATIVE FREE 10 ML: 5 INJECTION INTRAVENOUS at 20:40

## 2022-03-01 RX ADMIN — GUAIFENESIN 600 MG: 600 TABLET, EXTENDED RELEASE ORAL at 20:39

## 2022-03-01 RX ADMIN — MONTELUKAST SODIUM 10 MG: 10 TABLET, COATED ORAL at 20:40

## 2022-03-01 RX ADMIN — ENOXAPARIN SODIUM 40 MG: 100 INJECTION SUBCUTANEOUS at 09:33

## 2022-03-01 RX ADMIN — TIOTROPIUM BROMIDE INHALATION SPRAY 2 PUFF: 3.12 SPRAY, METERED RESPIRATORY (INHALATION) at 08:18

## 2022-03-01 RX ADMIN — GABAPENTIN 600 MG: 300 CAPSULE ORAL at 09:32

## 2022-03-01 RX ADMIN — METHYLPREDNISOLONE SODIUM SUCCINATE 40 MG: 40 INJECTION, POWDER, FOR SOLUTION INTRAMUSCULAR; INTRAVENOUS at 09:33

## 2022-03-01 RX ADMIN — BUSPIRONE HYDROCHLORIDE 10 MG: 10 TABLET ORAL at 20:40

## 2022-03-01 RX ADMIN — Medication 2 PUFF: at 08:18

## 2022-03-01 ASSESSMENT — PAIN SCALES - GENERAL
PAINLEVEL_OUTOF10: 4
PAINLEVEL_OUTOF10: 0
PAINLEVEL_OUTOF10: 3

## 2022-03-01 ASSESSMENT — PAIN DESCRIPTION - LOCATION: LOCATION: BACK

## 2022-03-01 ASSESSMENT — PAIN DESCRIPTION - PAIN TYPE: TYPE: ACUTE PAIN

## 2022-03-01 NOTE — PROGRESS NOTES
Pt reports blood in sputum. Upon examination there is trace blood in the sputum. Pt reports nausea from breathing treatments and increased coughing. IV Zofran administered.

## 2022-03-01 NOTE — PROGRESS NOTES
RT Inhaler-Nebulizer Bronchodilator Protocol Note    There is a bronchodilator order in the chart from a provider indicating to follow the RT Bronchodilator Protocol and there is an Initiate RT Inhaler-Nebulizer Bronchodilator Protocol order as well (see protocol at bottom of note). CXR Findings:  No results found. The findings from the last RT Protocol Assessment were as follows:   History Pulmonary Disease: (P) Smoker 15 pack years or more  Respiratory Pattern: (P) Dyspnea on exertion or RR 21-25 bpm  Breath Sounds: (P) Severe inspiratory and expiratory wheezing or severely diminished  Cough: (P) Strong, productive  Indication for Bronchodilator Therapy: (P) Wheezing associated with pulm disorder  Bronchodilator Assessment Score: (P) 12    Aerosolized bronchodilator medication orders have been revised according to the RT Inhaler-Nebulizer Bronchodilator Protocol below. Respiratory Therapist to perform RT Therapy Protocol Assessment initially then follow the protocol. Repeat RT Therapy Protocol Assessment PRN for score 0-3 or on second treatment, BID, and PRN for scores above 3. No Indications - adjust the frequency to every 6 hours PRN wheezing or bronchospasm, if no treatments needed after 48 hours then discontinue using Per Protocol order mode. If indication present, adjust the RT bronchodilator orders based on the Bronchodilator Assessment Score as indicated below. Use Inhaler orders unless patient has one or more of the following: on home nebulizer, not able to hold breath for 10 seconds, is not alert and oriented, cannot activate and use MDI correctly, or respiratory rate 25 breaths per minute or more, then use the equivalent nebulizer order(s) with same Frequency and PRN reasons based on the score. If a patient is on this medication at home then do not decrease Frequency below that used at home.     0-3 - enter or revise RT bronchodilator order(s) to equivalent RT Bronchodilator order with Frequency of every 4 hours PRN for wheezing or increased work of breathing using Per Protocol order mode. 4-6 - enter or revise RT Bronchodilator order(s) to two equivalent RT bronchodilator orders with one order with BID Frequency and one order with Frequency of every 4 hours PRN wheezing or increased work of breathing using Per Protocol order mode. 7-10 - enter or revise RT Bronchodilator order(s) to two equivalent RT bronchodilator orders with one order with TID Frequency and one order with Frequency of every 4 hours PRN wheezing or increased work of breathing using Per Protocol order mode. 11-13 - enter or revise RT Bronchodilator order(s) to one equivalent RT bronchodilator order with QID Frequency and an Albuterol order with Frequency of every 4 hours PRN wheezing or increased work of breathing using Per Protocol order mode. Greater than 13 - enter or revise RT Bronchodilator order(s) to one equivalent RT bronchodilator order with every 4 hours Frequency and an Albuterol order with Frequency of every 2 hours PRN wheezing or increased work of breathing using Per Protocol order mode. RT to enter RT Home Evaluation for COPD & MDI Assessment order using Per Protocol order mode.     Electronically signed by Grover Sandoval RCP on 3/1/2022 at 5:32 PM

## 2022-03-01 NOTE — FLOWSHEET NOTE
02/28/22 1954   Vital Signs   Temp 97.4 °F (36.3 °C)   Temp Source Oral   Pulse 90   Heart Rate Source Monitor   Resp 18   /71   BP Location Right upper arm   Patient Position Semi fowlers   Level of Consciousness Alert (0)   MEWS Score 1   Oxygen Therapy   SpO2 92 %   O2 Device Nasal cannula   O2 Flow Rate (L/min) 1 L/min   Pt A/O assessment completed. Pt complains of SOB with little activity and with coughing. Meds given per MAR. Pt denies any needs at this time.  Call light within reach

## 2022-03-01 NOTE — PROGRESS NOTES
Bedside report given to [facility] pt in stable condition no needs at this time.  Call light within reach

## 2022-03-01 NOTE — PROGRESS NOTES
RT Inhaler-Nebulizer Bronchodilator Protocol Note    There is a bronchodilator order in the chart from a provider indicating to follow the RT Bronchodilator Protocol and there is an Initiate RT Inhaler-Nebulizer Bronchodilator Protocol order as well (see protocol at bottom of note). CXR Findings:  No results found. The findings from the last RT Protocol Assessment were as follows:   History Pulmonary Disease: (P) Chronic pulmonary disease  Respiratory Pattern: (P) Dyspnea on exertion or RR 21-25 bpm  Breath Sounds: (P) Severe inspiratory and expiratory wheezing or severely diminished  Cough: (P) Strong, productive  Indication for Bronchodilator Therapy: (P) Wheezing associated with pulm disorder  Bronchodilator Assessment Score: (P) 13    Aerosolized bronchodilator medication orders have been revised according to the RT Inhaler-Nebulizer Bronchodilator Protocol below. Respiratory Therapist to perform RT Therapy Protocol Assessment initially then follow the protocol. Repeat RT Therapy Protocol Assessment PRN for score 0-3 or on second treatment, BID, and PRN for scores above 3. No Indications - adjust the frequency to every 6 hours PRN wheezing or bronchospasm, if no treatments needed after 48 hours then discontinue using Per Protocol order mode. If indication present, adjust the RT bronchodilator orders based on the Bronchodilator Assessment Score as indicated below. Use Inhaler orders unless patient has one or more of the following: on home nebulizer, not able to hold breath for 10 seconds, is not alert and oriented, cannot activate and use MDI correctly, or respiratory rate 25 breaths per minute or more, then use the equivalent nebulizer order(s) with same Frequency and PRN reasons based on the score. If a patient is on this medication at home then do not decrease Frequency below that used at home.     0-3 - enter or revise RT bronchodilator order(s) to equivalent RT Bronchodilator order with Frequency of every 4 hours PRN for wheezing or increased work of breathing using Per Protocol order mode. 4-6 - enter or revise RT Bronchodilator order(s) to two equivalent RT bronchodilator orders with one order with BID Frequency and one order with Frequency of every 4 hours PRN wheezing or increased work of breathing using Per Protocol order mode. 7-10 - enter or revise RT Bronchodilator order(s) to two equivalent RT bronchodilator orders with one order with TID Frequency and one order with Frequency of every 4 hours PRN wheezing or increased work of breathing using Per Protocol order mode. 11-13 - enter or revise RT Bronchodilator order(s) to one equivalent RT bronchodilator order with QID Frequency and an Albuterol order with Frequency of every 4 hours PRN wheezing or increased work of breathing using Per Protocol order mode. Greater than 13 - enter or revise RT Bronchodilator order(s) to one equivalent RT bronchodilator order with every 4 hours Frequency and an Albuterol order with Frequency of every 2 hours PRN wheezing or increased work of breathing using Per Protocol order mode.          Electronically signed by Colette Jacinto RCP on 3/1/2022 at 6:59 PM

## 2022-03-01 NOTE — FLOWSHEET NOTE
03/01/22 0711   Vital Signs   Temp 97.2 °F (36.2 °C)   Temp Source Oral   Pulse 93   Resp 16   /75   BP Location Right upper arm   Patient Position Semi fowlers   Level of Consciousness Alert (0)   MEWS Score 1   Patient Currently in Pain Denies   Pain Assessment   Pain Assessment 0-10   Oxygen Therapy   SpO2 93 %   O2 Device Nasal cannula   O2 Flow Rate (L/min) 1 L/min     AM assessment completed and vital signs completed, see flowsheet. Vital signs are WNL. Patient is alert & oriented. No complaints voiced. Patient denies further needs. Side rails up X 2. Bed in the lowest position. Call light within reach.

## 2022-03-01 NOTE — PROGRESS NOTES
Progress Note    Admit Date:  2/26/2022    73year old female with hyperlipidemia, fibromyalgia, DDD, and COPD that presented to [facility] with shortness of breath. Admitted for COPD exacerbation. Subjective:  Ms. Sejal Arce is more short of breath today with wheezing   - she ambulated around her room with me, became very dyspneic. O2 sat 89% on RA with exertion. - complaining of muscle spasms of upper abdomen and rib cage    - no significant nursing events overnight     Objective:   Patient Vitals for the past 4 hrs:   BP Temp Temp src Pulse Resp SpO2   03/01/22 0820 -- -- -- -- 16 93 %   03/01/22 0711 118/75 97.2 °F (36.2 °C) Oral 93 16 93 %            Intake/Output Summary (Last 24 hours) at 3/1/2022 0848  Last data filed at 2/28/2022 1318  Gross per 24 hour   Intake 240 ml   Output --   Net 240 ml       Physical Exam:  Gen: + mild distress. Alert. Eyes: PERRL. No sclera icterus. No conjunctival injection. ENT: No discharge. Pharynx clear. Neck: Trachea midline. Resp: + accessory muscle use. No crackles. LS diminished with diffuse wheezes. No rhonchi. CV: Regular rate. Regular rhythm. No murmur. No rub. No edema. GI: Non-tender. Non-distended. Normal bowel sounds. No hernia. Skin: Warm and dry. No nodule on exposed extremities. No rash on exposed extremities. M/S: No cyanosis. No joint deformity. No clubbing. Neuro: Awake. Grossly nonfocal    Psych: Oriented x 3. No anxiety or agitation    Data:  CBC:   Recent Labs     02/26/22  1807 02/28/22  0605   WBC 11.6* 11.7*   HGB 14.0 12.1   HCT 42.3 36.1   MCV 84.8 85.0    320     BMP:   Recent Labs     02/26/22  1807 02/28/22  0605   * 142   K 3.7 5.0    103   CO2 22 27   BUN 16 18   CREATININE 0.7 0.8     LIVER PROFILE:   Recent Labs     02/26/22 1807   AST 17   ALT 18   BILITOT 0.6   ALKPHOS 110     PT/INR: No results for input(s): PROTIME, INR in the last 72 hours.     CULTURES  Blood: NGTD  COVID/influenza: not detected    RADIOLOGY  CT ABDOMEN PELVIS W IV CONTRAST Additional Contrast? None   Final Result   CT PA:      No evidence of pulmonary embolism or aortic dissection. Diffuse bronchial wall thickening. Filling defects are noted in the lower   lung airways bilaterally, greater on the right. Differential considerations   include mucous plugging and aspiration. Emphysema. Abdomen and pelvis CT:      Extensive diverticulosis of the large bowel, especially the region of the   sigmoid colon, but without convincing CT evidence of diverticulitis. Status post cholecystectomy. CT CHEST PULMONARY EMBOLISM W CONTRAST   Final Result   CT PA:      No evidence of pulmonary embolism or aortic dissection. Diffuse bronchial wall thickening. Filling defects are noted in the lower   lung airways bilaterally, greater on the right. Differential considerations   include mucous plugging and aspiration. Emphysema. Abdomen and pelvis CT:      Extensive diverticulosis of the large bowel, especially the region of the   sigmoid colon, but without convincing CT evidence of diverticulitis. Status post cholecystectomy. XR CHEST (2 VW)   Final Result   Hyperinflated, clear lungs. Leandro Taylor MD have reviewed the chart on [facility] and personally interviewed and examined patient, reviewed the data (labs and imaging) and after discussion with my PA formulated the plan. Agree with note with the following edits. HPI:     Admitted for COPD exacerbation. She is a former smoker not on home oxygen. Feels more dyspneic with exertion. Feeling tired. No fever. 3/1- still short of breath. She dropped to 80% on room air with walking to the BR. On oxygen. I reviewed the patient's Past Medical History, Past Surgical History, Medications, and Allergies.      Physical exam:    /75   Pulse 93   Temp 97.2 °F (36.2 °C) (Oral)   Resp 16   Ht 5' 5\" (1.651 m)   Wt 129 lb 6 oz (58.7 kg)   SpO2 93%   BMI 21.53 kg/m²     Gen: Moderate distress. Alert. Eyes: PERRL. No sclera icterus. No conjunctival injection. ENT: No discharge. Pharynx clear. Neck: Trachea midline. Normal thyroid. Resp: + accessory muscle use. no crackles. + wheezes. No rhonchi. No dullness on percussion. CV: Regular rate. Regular rhythm. No murmur or rub. No edema. Assessment/Plan:    #COPD exacerbation  #Hypoxia  - does not use home O2. Required up to 2L   - no infiltrate on imaging  - prednisone -> solumedrol 40 mg BID   - start doxy D#1  - inhaled bronchodilators scheduled and PRN  - check sputum cultures   - cont Symbicort   - try zanaflex for muscle spasms    #Elevated BP without diagnosis of Hypertension -resolved. #Fibromyalgia  #DDD  - cont Gabapentin    #GERD  - cont PPI    #Hyperlipidemia  - on Atorvastatin    #Iron deficiency anemia  - cont ferrous sulfate    #Anxiety  - cont Buspar, Atarax. DVT Prophylaxis: Lovenox  Diet: ADULT DIET;  Regular; Low Fat/Low Chol/High Fiber/2 gm Na  Code Status: Full Code    Fortunato Huitron PA-C  3/1/2022 8:52 AM     Rojelio Godinez MD 3/1/2022 10:14 AM

## 2022-03-01 NOTE — PLAN OF CARE
Problem: Infection:  Goal: Will remain free from infection  Description: Will remain free from infection  Outcome: Ongoing     Problem: Daily Care:  Goal: Daily care needs are met  Description: Daily care needs are met  Outcome: Ongoing     Problem: Pain:  Goal: Patient's pain/discomfort is manageable  Description: Patient's pain/discomfort is manageable  Outcome: Ongoing     Problem: Skin Integrity:  Goal: Skin integrity will stabilize  Description: Skin integrity will stabilize  Outcome: Ongoing

## 2022-03-01 NOTE — ED PROVIDER NOTES
EKG interpretation    I was asked to interpret this EKG. I have not seen or evaluated this patient.   My interpretation is as follows:    Normal sinus rhythm, rate 100, normal axis, no evidence of conduction abnormalities, borderline atrial enlargement borderline LVH pattern, no diagnostic ischemic changes, QTc 428.    6:34 AM          Sravanthi Valdez MD  03/01/22 6601

## 2022-03-02 LAB
ANION GAP SERPL CALCULATED.3IONS-SCNC: 10 MMOL/L (ref 3–16)
BASOPHILS ABSOLUTE: 0 K/UL (ref 0–0.2)
BASOPHILS RELATIVE PERCENT: 0.1 %
BLOOD CULTURE, ROUTINE: NORMAL
BUN BLDV-MCNC: 17 MG/DL (ref 7–20)
CALCIUM SERPL-MCNC: 9.4 MG/DL (ref 8.3–10.6)
CHLORIDE BLD-SCNC: 103 MMOL/L (ref 99–110)
CO2: 26 MMOL/L (ref 21–32)
CREAT SERPL-MCNC: 0.7 MG/DL (ref 0.6–1.2)
EOSINOPHILS ABSOLUTE: 0 K/UL (ref 0–0.6)
EOSINOPHILS RELATIVE PERCENT: 0 %
GFR AFRICAN AMERICAN: >60
GFR NON-AFRICAN AMERICAN: >60
GLUCOSE BLD-MCNC: 144 MG/DL (ref 70–99)
HCT VFR BLD CALC: 34.8 % (ref 36–48)
HEMOGLOBIN: 11.6 G/DL (ref 12–16)
LYMPHOCYTES ABSOLUTE: 0.8 K/UL (ref 1–5.1)
LYMPHOCYTES RELATIVE PERCENT: 8.7 %
MCH RBC QN AUTO: 28.6 PG (ref 26–34)
MCHC RBC AUTO-ENTMCNC: 33.3 G/DL (ref 31–36)
MCV RBC AUTO: 85.9 FL (ref 80–100)
MONOCYTES ABSOLUTE: 0.7 K/UL (ref 0–1.3)
MONOCYTES RELATIVE PERCENT: 7.6 %
NEUTROPHILS ABSOLUTE: 7.9 K/UL (ref 1.7–7.7)
NEUTROPHILS RELATIVE PERCENT: 83.6 %
PDW BLD-RTO: 14.6 % (ref 12.4–15.4)
PLATELET # BLD: 296 K/UL (ref 135–450)
PMV BLD AUTO: 6.6 FL (ref 5–10.5)
POTASSIUM REFLEX MAGNESIUM: 4.9 MMOL/L (ref 3.5–5.1)
RBC # BLD: 4.05 M/UL (ref 4–5.2)
SODIUM BLD-SCNC: 139 MMOL/L (ref 136–145)
WBC # BLD: 9.4 K/UL (ref 4–11)

## 2022-03-02 PROCEDURE — 80048 BASIC METABOLIC PNL TOTAL CA: CPT

## 2022-03-02 PROCEDURE — 94640 AIRWAY INHALATION TREATMENT: CPT

## 2022-03-02 PROCEDURE — 99232 SBSQ HOSP IP/OBS MODERATE 35: CPT | Performed by: INTERNAL MEDICINE

## 2022-03-02 PROCEDURE — 6370000000 HC RX 637 (ALT 250 FOR IP): Performed by: FAMILY MEDICINE

## 2022-03-02 PROCEDURE — 6370000000 HC RX 637 (ALT 250 FOR IP): Performed by: PHYSICIAN ASSISTANT

## 2022-03-02 PROCEDURE — 2700000000 HC OXYGEN THERAPY PER DAY

## 2022-03-02 PROCEDURE — 85025 COMPLETE CBC W/AUTO DIFF WBC: CPT

## 2022-03-02 PROCEDURE — 6360000002 HC RX W HCPCS: Performed by: FAMILY MEDICINE

## 2022-03-02 PROCEDURE — 2580000003 HC RX 258: Performed by: FAMILY MEDICINE

## 2022-03-02 PROCEDURE — 6370000000 HC RX 637 (ALT 250 FOR IP)

## 2022-03-02 PROCEDURE — 94761 N-INVAS EAR/PLS OXIMETRY MLT: CPT

## 2022-03-02 PROCEDURE — 36415 COLL VENOUS BLD VENIPUNCTURE: CPT

## 2022-03-02 PROCEDURE — 1200000000 HC SEMI PRIVATE

## 2022-03-02 PROCEDURE — 6360000002 HC RX W HCPCS: Performed by: PHYSICIAN ASSISTANT

## 2022-03-02 RX ADMIN — METHYLPREDNISOLONE SODIUM SUCCINATE 40 MG: 40 INJECTION, POWDER, FOR SOLUTION INTRAMUSCULAR; INTRAVENOUS at 20:04

## 2022-03-02 RX ADMIN — FERROUS SULFATE TAB 325 MG (65 MG ELEMENTAL FE) 325 MG: 325 (65 FE) TAB at 20:05

## 2022-03-02 RX ADMIN — TIZANIDINE 4 MG: 4 TABLET ORAL at 10:22

## 2022-03-02 RX ADMIN — BUSPIRONE HYDROCHLORIDE 10 MG: 10 TABLET ORAL at 14:58

## 2022-03-02 RX ADMIN — IPRATROPIUM BROMIDE AND ALBUTEROL SULFATE 1 AMPULE: 2.5; .5 SOLUTION RESPIRATORY (INHALATION) at 11:28

## 2022-03-02 RX ADMIN — GUAIFENESIN 600 MG: 600 TABLET, EXTENDED RELEASE ORAL at 20:05

## 2022-03-02 RX ADMIN — PANTOPRAZOLE SODIUM 40 MG: 40 TABLET, DELAYED RELEASE ORAL at 05:03

## 2022-03-02 RX ADMIN — GUAIFENESIN 600 MG: 600 TABLET, EXTENDED RELEASE ORAL at 10:22

## 2022-03-02 RX ADMIN — IPRATROPIUM BROMIDE AND ALBUTEROL SULFATE 1 AMPULE: 2.5; .5 SOLUTION RESPIRATORY (INHALATION) at 15:54

## 2022-03-02 RX ADMIN — DOXYCYCLINE HYCLATE 100 MG: 100 TABLET, COATED ORAL at 20:05

## 2022-03-02 RX ADMIN — IPRATROPIUM BROMIDE AND ALBUTEROL SULFATE 1 AMPULE: 2.5; .5 SOLUTION RESPIRATORY (INHALATION) at 23:41

## 2022-03-02 RX ADMIN — GABAPENTIN 600 MG: 300 CAPSULE ORAL at 20:05

## 2022-03-02 RX ADMIN — GABAPENTIN 600 MG: 300 CAPSULE ORAL at 14:57

## 2022-03-02 RX ADMIN — ACETAMINOPHEN 650 MG: 325 TABLET ORAL at 10:22

## 2022-03-02 RX ADMIN — FERROUS SULFATE TAB 325 MG (65 MG ELEMENTAL FE) 325 MG: 325 (65 FE) TAB at 10:22

## 2022-03-02 RX ADMIN — SODIUM CHLORIDE, PRESERVATIVE FREE 10 ML: 5 INJECTION INTRAVENOUS at 10:23

## 2022-03-02 RX ADMIN — TRAZODONE HYDROCHLORIDE 100 MG: 100 TABLET ORAL at 20:05

## 2022-03-02 RX ADMIN — Medication 2 PUFF: at 07:49

## 2022-03-02 RX ADMIN — DOXYCYCLINE HYCLATE 100 MG: 100 TABLET, COATED ORAL at 10:22

## 2022-03-02 RX ADMIN — METHYLPREDNISOLONE SODIUM SUCCINATE 40 MG: 40 INJECTION, POWDER, FOR SOLUTION INTRAMUSCULAR; INTRAVENOUS at 10:22

## 2022-03-02 RX ADMIN — IPRATROPIUM BROMIDE AND ALBUTEROL SULFATE 1 AMPULE: 2.5; .5 SOLUTION RESPIRATORY (INHALATION) at 19:52

## 2022-03-02 RX ADMIN — FLUTICASONE PROPIONATE 2 SPRAY: 50 SPRAY, METERED NASAL at 10:23

## 2022-03-02 RX ADMIN — BUSPIRONE HYDROCHLORIDE 10 MG: 10 TABLET ORAL at 10:22

## 2022-03-02 RX ADMIN — ATORVASTATIN CALCIUM 20 MG: 10 TABLET, FILM COATED ORAL at 10:22

## 2022-03-02 RX ADMIN — MONTELUKAST SODIUM 10 MG: 10 TABLET, COATED ORAL at 20:05

## 2022-03-02 RX ADMIN — BUSPIRONE HYDROCHLORIDE 10 MG: 10 TABLET ORAL at 20:04

## 2022-03-02 RX ADMIN — GABAPENTIN 600 MG: 300 CAPSULE ORAL at 10:21

## 2022-03-02 RX ADMIN — Medication 2 PUFF: at 19:52

## 2022-03-02 RX ADMIN — ENOXAPARIN SODIUM 40 MG: 100 INJECTION SUBCUTANEOUS at 10:22

## 2022-03-02 RX ADMIN — IPRATROPIUM BROMIDE AND ALBUTEROL SULFATE 1 AMPULE: 2.5; .5 SOLUTION RESPIRATORY (INHALATION) at 07:48

## 2022-03-02 RX ADMIN — ACETAMINOPHEN 650 MG: 325 TABLET ORAL at 20:04

## 2022-03-02 ASSESSMENT — PAIN SCALES - GENERAL
PAINLEVEL_OUTOF10: 6
PAINLEVEL_OUTOF10: 4
PAINLEVEL_OUTOF10: 5
PAINLEVEL_OUTOF10: 5
PAINLEVEL_OUTOF10: 3

## 2022-03-02 ASSESSMENT — PAIN DESCRIPTION - PAIN TYPE: TYPE: ACUTE PAIN

## 2022-03-02 ASSESSMENT — PAIN DESCRIPTION - FREQUENCY: FREQUENCY: CONTINUOUS

## 2022-03-02 ASSESSMENT — PAIN DESCRIPTION - ORIENTATION: ORIENTATION: LOWER

## 2022-03-02 ASSESSMENT — PAIN DESCRIPTION - ONSET: ONSET: ON-GOING

## 2022-03-02 ASSESSMENT — PAIN DESCRIPTION - DESCRIPTORS: DESCRIPTORS: PRESSURE;SORE

## 2022-03-02 ASSESSMENT — PAIN DESCRIPTION - LOCATION: LOCATION: BACK

## 2022-03-02 ASSESSMENT — PAIN DESCRIPTION - PROGRESSION: CLINICAL_PROGRESSION: GRADUALLY WORSENING

## 2022-03-02 ASSESSMENT — PAIN - FUNCTIONAL ASSESSMENT: PAIN_FUNCTIONAL_ASSESSMENT: ACTIVITIES ARE NOT PREVENTED

## 2022-03-02 ASSESSMENT — PAIN DESCRIPTION - DIRECTION: RADIATING_TOWARDS: SACRUM

## 2022-03-02 NOTE — PROGRESS NOTES
RT Inhaler-Nebulizer Bronchodilator Protocol Note    There is a bronchodilator order in the chart from a provider indicating to follow the RT Bronchodilator Protocol and there is an Initiate RT Inhaler-Nebulizer Bronchodilator Protocol order as well (see protocol at bottom of note). CXR Findings:  No results found. The findings from the last RT Protocol Assessment were as follows:   History Pulmonary Disease: (P) Chronic pulmonary disease  Respiratory Pattern: (P) Dyspnea on exertion or RR 21-25 bpm  Breath Sounds: (P) Inspiratory and expiratory or bilateral wheezing and/or rhonchi  Cough: (P) Strong, productive  Indication for Bronchodilator Therapy: (P) Wheezing associated with pulm disorder  Bronchodilator Assessment Score: (P) 11    Aerosolized bronchodilator medication orders have been revised according to the RT Inhaler-Nebulizer Bronchodilator Protocol below. Respiratory Therapist to perform RT Therapy Protocol Assessment initially then follow the protocol. Repeat RT Therapy Protocol Assessment PRN for score 0-3 or on second treatment, BID, and PRN for scores above 3. No Indications - adjust the frequency to every 6 hours PRN wheezing or bronchospasm, if no treatments needed after 48 hours then discontinue using Per Protocol order mode. If indication present, adjust the RT bronchodilator orders based on the Bronchodilator Assessment Score as indicated below. Use Inhaler orders unless patient has one or more of the following: on home nebulizer, not able to hold breath for 10 seconds, is not alert and oriented, cannot activate and use MDI correctly, or respiratory rate 25 breaths per minute or more, then use the equivalent nebulizer order(s) with same Frequency and PRN reasons based on the score. If a patient is on this medication at home then do not decrease Frequency below that used at home.     0-3 - enter or revise RT bronchodilator order(s) to equivalent RT Bronchodilator order with Frequency of every 4 hours PRN for wheezing or increased work of breathing using Per Protocol order mode. 4-6 - enter or revise RT Bronchodilator order(s) to two equivalent RT bronchodilator orders with one order with BID Frequency and one order with Frequency of every 4 hours PRN wheezing or increased work of breathing using Per Protocol order mode. 7-10 - enter or revise RT Bronchodilator order(s) to two equivalent RT bronchodilator orders with one order with TID Frequency and one order with Frequency of every 4 hours PRN wheezing or increased work of breathing using Per Protocol order mode. 11-13 - enter or revise RT Bronchodilator order(s) to one equivalent RT bronchodilator order with QID Frequency and an Albuterol order with Frequency of every 4 hours PRN wheezing or increased work of breathing using Per Protocol order mode. Greater than 13 - enter or revise RT Bronchodilator order(s) to one equivalent RT bronchodilator order with every 4 hours Frequency and an Albuterol order with Frequency of every 2 hours PRN wheezing or increased work of breathing using Per Protocol order mode. RT to enter RT Home Evaluation for COPD & MDI Assessment order using Per Protocol order mode.     Electronically signed by Grover Sandoval RCP on 3/2/2022 at 11:40 AM

## 2022-03-02 NOTE — CARE COORDINATION
INTERDISCIPLINARY PLAN OF CARE CONFERENCE    Date/Time: 3/2/2022 11:42 AM  Completed by: Odilia Cabrera RN, Case Management      Patient Name:  Celeste Agosto  YOB: 1955  Admitting Diagnosis: Diverticulosis [K57.90]  Dyspnea on exertion [R06.00]  COPD exacerbation ([facility] [address] [J44.1]  Nausea and vomiting, intractability of vomiting not specified, unspecified vomiting type [R11.2]     Admit Date/Time:  2/26/2022  5:51 PM    Chart reviewed. Interdisciplinary team contacted or reviewed plan related to patient progress and discharge plans. Disciplines included Case Management, Nursing, and Dietitian. Current Status: IP 02/28/2022  PT/OT recommendation for discharge plan of care: NA    Expected D/C Disposition:  Home    Discharge Plan Comments: Pt is IPTA. CM following for possible home O2 pending walk testing. [employer] consulted.  +CM following    Home O2 in place on admit: No

## 2022-03-02 NOTE — PLAN OF CARE
Problem: Infection:  Goal: Will remain free from infection  Description: Will remain free from infection  3/1/2022 1924 by Susan Ojeda RN  Outcome: Ongoing  3/1/2022 1530 by Denise Zuñiga RN  Outcome: Ongoing     Problem: Safety:  Goal: Free from accidental physical injury  Description: Free from accidental physical injury  3/1/2022 1924 by Susan Ojeda RN  Outcome: Ongoing  3/1/2022 1530 by Denise Zuñiga RN  Outcome: Ongoing  Goal: Free from intentional harm  Description: Free from intentional harm  Outcome: Ongoing     Problem: Daily Care:  Goal: Daily care needs are met  Description: Daily care needs are met  3/1/2022 1924 by Susan Ojeda RN  Outcome: Ongoing  3/1/2022 1530 by Denise Zuñiga RN  Outcome: Ongoing     Problem: Pain:  Goal: Patient's pain/discomfort is manageable  Description: Patient's pain/discomfort is manageable  Outcome: Ongoing    Problem: Skin Integrity:  Goal: Skin integrity will stabilize  Description: Skin integrity will stabilize  Outcome: Ongoing

## 2022-03-02 NOTE — FLOWSHEET NOTE
03/02/22 0904   Vital Signs   Temp 98.1 °F (36.7 °C)   Temp Source Oral   Pulse 82   Heart Rate Source Monitor   Resp 18   /72   BP Location Right upper arm   Patient Position Semi fowlers   Level of Consciousness Alert (0)   MEWS Score 1   Patient Currently in Pain Denies   Oxygen Therapy   SpO2 93 %   Pulse Oximeter Device Mode Intermittent   Pulse Oximeter Device Location Left;Finger   O2 Device None (Room air)     AM assessment completed and vital signs completed, see flowsheet. Vital signs are WNL. Patient is alert & oriented. No complaints voiced. Patient denies further needs. Side rails up X 2. Bed in the lowest position. Call light within reach.

## 2022-03-02 NOTE — PROGRESS NOTES
Progress Note    Admit Date:  2/26/2022    73year old female with hyperlipidemia, fibromyalgia, DDD, and COPD that presented to [facility] with shortness of breath. Admitted for COPD exacerbation. Subjective:    Ms. Júnior Lopez is feeling better today  - wheezing improved   - scant blood in sputum last evening, no recurrence     Objective:   Patient Vitals for the past 4 hrs:   BP Temp Temp src Pulse Resp SpO2   03/02/22 0904 114/72 98.1 °F (36.7 °C) Oral 82 18 93 %   03/02/22 0750 -- -- -- -- -- 93 %       Intake/Output Summary (Last 24 hours) at 3/2/2022 1114  Last data filed at 3/2/2022 0800  Gross per 24 hour   Intake 970 ml   Output --   Net 970 ml       Physical Exam:    Gen: No distress. Alert. Eyes: PERRL. No sclera icterus. No conjunctival injection. ENT: No discharge. Pharynx clear. Neck: Trachea midline. Resp: No accessory muscle use. No crackles. LS diminished with diffuse wheezes. No rhonchi. CV: Regular rate. Regular rhythm. No murmur. No rub. No edema. GI: Non-tender. Non-distended. Normal bowel sounds. No hernia. Skin: Warm and dry. No nodule on exposed extremities. No rash on exposed extremities. M/S: No cyanosis. No joint deformity. No clubbing. Neuro: Awake. Grossly nonfocal    Psych: Oriented x 3. No anxiety or agitation    Data:  CBC:   Recent Labs     02/28/22  0605 03/02/22  0513   WBC 11.7* 9.4   HGB 12.1 11.6*   HCT 36.1 34.8*   MCV 85.0 85.9    296     BMP:   Recent Labs     02/28/22  0605 03/02/22  0513    139   K 5.0 4.9    103   CO2 27 26   BUN 18 17   CREATININE 0.8 0.7     LIVER PROFILE:   No results for input(s): AST, ALT, LIPASE, BILIDIR, BILITOT, ALKPHOS in the last 72 hours. Invalid input(s): AMYLASE,  ALB  PT/INR: No results for input(s): PROTIME, INR in the last 72 hours.     CULTURES  Blood: NGTD  COVID/influenza: not detected  Sputum: pending     RADIOLOGY  CT ABDOMEN PELVIS W IV CONTRAST Additional Contrast? None   Final Result   CT PA:      No evidence of pulmonary embolism or aortic dissection. Diffuse bronchial wall thickening. Filling defects are noted in the lower   lung airways bilaterally, greater on the right. Differential considerations   include mucous plugging and aspiration. Emphysema. Abdomen and pelvis CT:      Extensive diverticulosis of the large bowel, especially the region of the   sigmoid colon, but without convincing CT evidence of diverticulitis. Status post cholecystectomy. CT CHEST PULMONARY EMBOLISM W CONTRAST   Final Result   CT PA:      No evidence of pulmonary embolism or aortic dissection. Diffuse bronchial wall thickening. Filling defects are noted in the lower   lung airways bilaterally, greater on the right. Differential considerations   include mucous plugging and aspiration. Emphysema. Abdomen and pelvis CT:      Extensive diverticulosis of the large bowel, especially the region of the   sigmoid colon, but without convincing CT evidence of diverticulitis. Status post cholecystectomy. XR CHEST (2 VW)   Final Result   Hyperinflated, clear lungs. Yamini Holder MD have reviewed the chart on [facility] and personally interviewed and examined patient, reviewed the data (labs and imaging) and after discussion with my PA formulated the plan. Agree with note with the following edits. HPI:     Admitted for COPD exacerbation. She is a former smoker not on home oxygen. Feels more dyspneic with exertion. Feeling tired. No fever. 3/1- still short of breath. She dropped to 80% on room air with walking to the BR. On oxygen. 3/2- doing better. On room air at rest     I reviewed the patient's Past Medical History, Past Surgical History, Medications, and Allergies.      Physical exam:    /72   Pulse 82   Temp 98.1 °F (36.7 °C) (Oral)   Resp 18   Ht 5' 5\" (1.651 m)   Wt 129 lb 6 oz (58.7 kg)   SpO2 93%   BMI 21.53 kg/m²     Gen: no distress. Alert. Eyes: PERRL. No sclera icterus. No conjunctival injection. ENT: No discharge. Pharynx clear. Neck: Trachea midline. Normal thyroid. Resp: minimal accessory muscle use. no crackles. + wheezes. No rhonchi. No dullness on percussion. CV: Regular rate. Regular rhythm. No murmur or rub. No edema. Assessment/Plan:    #COPD exacerbation  #Hypoxia  - does not use home O2. Required up to 2L. On RA today   - no infiltrate on imaging  - prednisone -> solumedrol 40 mg BID   - on doxy D#2/5  - inhaled bronchodilators scheduled and PRN  - checked sputum cultures - no growth so far   - cont Symbicort   - try zanaflex for muscle spasms    #Elevated BP without diagnosis of Hypertension -resolved. #Fibromyalgia  #DDD  - cont Gabapentin    #GERD  - cont PPI    #Hyperlipidemia  - on Atorvastatin    #Iron deficiency anemia  - cont ferrous sulfate    #Anxiety  - cont Buspar, Atarax. DVT Prophylaxis: Lovenox  Diet: ADULT DIET;  Regular  Code Status: Full Code    Jocy Vivar PA-C  3/2/2022 11:14 AM       Lizeth Luque MD 3/2/2022 12:48 PM

## 2022-03-02 NOTE — PLAN OF CARE
Problem: Pain:  Goal: Pain level will decrease  Description: Pain level will decrease  Outcome: Ongoing     Problem: Discharge Planning:  Goal: Patients continuum of care needs are met  Description: Patients continuum of care needs are met  Outcome: Ongoing     Problem: Falls - Risk of:  Goal: Will remain free from falls  Description: Will remain free from falls  Outcome: Ongoing

## 2022-03-02 NOTE — FLOWSHEET NOTE
03/01/22 1947   Vital Signs   Temp 97.3 °F (36.3 °C)   Temp Source Oral   Pulse 90   Heart Rate Source Monitor   Resp 18   BP (!) 92/51   BP Location Left upper arm   Patient Position Semi fowlers   Level of Consciousness Alert (0)   MEWS Score 2   Oxygen Therapy   SpO2 96 %   O2 Device Nasal cannula   O2 Flow Rate (L/min) 2 L/min   Assessment complete- see flowsheets. Pt resting in bed at this time, PRN medications given per pt request within PRN order parameters- further needs denied by pt at this time. Call light within reach, bed alarm in place. Pt aware to call for any needs or changes. Will continue to monitor.   Cyril Avalos RN

## 2022-03-03 VITALS
RESPIRATION RATE: 16 BRPM | HEART RATE: 92 BPM | SYSTOLIC BLOOD PRESSURE: 107 MMHG | TEMPERATURE: 97.9 F | OXYGEN SATURATION: 91 % | DIASTOLIC BLOOD PRESSURE: 71 MMHG | HEIGHT: 65 IN | BODY MASS INDEX: 21.56 KG/M2 | WEIGHT: 129.38 LBS

## 2022-03-03 LAB
ANION GAP SERPL CALCULATED.3IONS-SCNC: 9 MMOL/L (ref 3–16)
BASOPHILS ABSOLUTE: 0 K/UL (ref 0–0.2)
BASOPHILS RELATIVE PERCENT: 0.2 %
BUN BLDV-MCNC: 19 MG/DL (ref 7–20)
CALCIUM SERPL-MCNC: 9.3 MG/DL (ref 8.3–10.6)
CHLORIDE BLD-SCNC: 103 MMOL/L (ref 99–110)
CO2: 23 MMOL/L (ref 21–32)
CREAT SERPL-MCNC: 0.6 MG/DL (ref 0.6–1.2)
CULTURE, BLOOD 2: NORMAL
CULTURE, RESPIRATORY: NORMAL
EOSINOPHILS ABSOLUTE: 0 K/UL (ref 0–0.6)
EOSINOPHILS RELATIVE PERCENT: 0 %
GFR AFRICAN AMERICAN: >60
GFR NON-AFRICAN AMERICAN: >60
GLUCOSE BLD-MCNC: 137 MG/DL (ref 70–99)
GRAM STAIN RESULT: NORMAL
HCT VFR BLD CALC: 35.5 % (ref 36–48)
HEMOGLOBIN: 11.7 G/DL (ref 12–16)
LYMPHOCYTES ABSOLUTE: 0.9 K/UL (ref 1–5.1)
LYMPHOCYTES RELATIVE PERCENT: 9.3 %
MCH RBC QN AUTO: 28.2 PG (ref 26–34)
MCHC RBC AUTO-ENTMCNC: 32.8 G/DL (ref 31–36)
MCV RBC AUTO: 86 FL (ref 80–100)
MONOCYTES ABSOLUTE: 0.6 K/UL (ref 0–1.3)
MONOCYTES RELATIVE PERCENT: 6.3 %
NEUTROPHILS ABSOLUTE: 8.5 K/UL (ref 1.7–7.7)
NEUTROPHILS RELATIVE PERCENT: 84.2 %
PDW BLD-RTO: 14.4 % (ref 12.4–15.4)
PLATELET # BLD: 307 K/UL (ref 135–450)
PMV BLD AUTO: 6.5 FL (ref 5–10.5)
POTASSIUM REFLEX MAGNESIUM: 4.7 MMOL/L (ref 3.5–5.1)
RBC # BLD: 4.13 M/UL (ref 4–5.2)
SODIUM BLD-SCNC: 135 MMOL/L (ref 136–145)
WBC # BLD: 10.1 K/UL (ref 4–11)

## 2022-03-03 PROCEDURE — 6370000000 HC RX 637 (ALT 250 FOR IP): Performed by: FAMILY MEDICINE

## 2022-03-03 PROCEDURE — 94761 N-INVAS EAR/PLS OXIMETRY MLT: CPT

## 2022-03-03 PROCEDURE — 6360000002 HC RX W HCPCS: Performed by: PHYSICIAN ASSISTANT

## 2022-03-03 PROCEDURE — 94640 AIRWAY INHALATION TREATMENT: CPT

## 2022-03-03 PROCEDURE — 80048 BASIC METABOLIC PNL TOTAL CA: CPT

## 2022-03-03 PROCEDURE — 99239 HOSP IP/OBS DSCHRG MGMT >30: CPT | Performed by: INTERNAL MEDICINE

## 2022-03-03 PROCEDURE — 6370000000 HC RX 637 (ALT 250 FOR IP): Performed by: PHYSICIAN ASSISTANT

## 2022-03-03 PROCEDURE — 6370000000 HC RX 637 (ALT 250 FOR IP)

## 2022-03-03 PROCEDURE — 36415 COLL VENOUS BLD VENIPUNCTURE: CPT

## 2022-03-03 PROCEDURE — 85025 COMPLETE CBC W/AUTO DIFF WBC: CPT

## 2022-03-03 PROCEDURE — 6360000002 HC RX W HCPCS: Performed by: FAMILY MEDICINE

## 2022-03-03 PROCEDURE — 2700000000 HC OXYGEN THERAPY PER DAY

## 2022-03-03 PROCEDURE — 2580000003 HC RX 258: Performed by: FAMILY MEDICINE

## 2022-03-03 RX ORDER — PREDNISONE 10 MG/1
TABLET ORAL
Qty: 30 TABLET | Refills: 0 | Status: SHIPPED | OUTPATIENT
Start: 2022-03-03

## 2022-03-03 RX ORDER — DOXYCYCLINE HYCLATE 100 MG
100 TABLET ORAL EVERY 12 HOURS SCHEDULED
Qty: 10 TABLET | Refills: 0 | Status: SHIPPED | OUTPATIENT
Start: 2022-03-03 | End: 2022-03-08

## 2022-03-03 RX ORDER — TIZANIDINE 4 MG/1
4 TABLET ORAL EVERY 6 HOURS PRN
Qty: 30 TABLET | Refills: 0 | Status: SHIPPED | OUTPATIENT
Start: 2022-03-03

## 2022-03-03 RX ORDER — POLYETHYLENE GLYCOL 3350 17 G/17G
17 POWDER, FOR SOLUTION ORAL DAILY
Qty: 1530 G | Refills: 1 | Status: SHIPPED | OUTPATIENT
Start: 2022-03-03 | End: 2022-04-02

## 2022-03-03 RX ORDER — ONDANSETRON 4 MG/1
4 TABLET, ORALLY DISINTEGRATING ORAL 3 TIMES DAILY PRN
Qty: 21 TABLET | Refills: 0 | Status: SHIPPED | OUTPATIENT
Start: 2022-03-03

## 2022-03-03 RX ADMIN — IPRATROPIUM BROMIDE AND ALBUTEROL SULFATE 1 AMPULE: 2.5; .5 SOLUTION RESPIRATORY (INHALATION) at 07:46

## 2022-03-03 RX ADMIN — PANTOPRAZOLE SODIUM 40 MG: 40 TABLET, DELAYED RELEASE ORAL at 05:48

## 2022-03-03 RX ADMIN — BUSPIRONE HYDROCHLORIDE 10 MG: 10 TABLET ORAL at 08:49

## 2022-03-03 RX ADMIN — ACETAMINOPHEN 650 MG: 325 TABLET ORAL at 11:28

## 2022-03-03 RX ADMIN — IPRATROPIUM BROMIDE AND ALBUTEROL SULFATE 1 AMPULE: 2.5; .5 SOLUTION RESPIRATORY (INHALATION) at 14:17

## 2022-03-03 RX ADMIN — TIZANIDINE 4 MG: 4 TABLET ORAL at 08:49

## 2022-03-03 RX ADMIN — ENOXAPARIN SODIUM 40 MG: 100 INJECTION SUBCUTANEOUS at 08:49

## 2022-03-03 RX ADMIN — SODIUM CHLORIDE, PRESERVATIVE FREE 10 ML: 5 INJECTION INTRAVENOUS at 08:49

## 2022-03-03 RX ADMIN — METHYLPREDNISOLONE SODIUM SUCCINATE 40 MG: 40 INJECTION, POWDER, FOR SOLUTION INTRAMUSCULAR; INTRAVENOUS at 08:48

## 2022-03-03 RX ADMIN — Medication 2 PUFF: at 07:46

## 2022-03-03 RX ADMIN — GABAPENTIN 600 MG: 300 CAPSULE ORAL at 13:32

## 2022-03-03 RX ADMIN — FERROUS SULFATE TAB 325 MG (65 MG ELEMENTAL FE) 325 MG: 325 (65 FE) TAB at 08:57

## 2022-03-03 RX ADMIN — BUSPIRONE HYDROCHLORIDE 10 MG: 10 TABLET ORAL at 13:32

## 2022-03-03 RX ADMIN — POLYETHYLENE GLYCOL 3350 17 G: 17 POWDER, FOR SOLUTION ORAL at 09:10

## 2022-03-03 RX ADMIN — ATORVASTATIN CALCIUM 20 MG: 10 TABLET, FILM COATED ORAL at 08:49

## 2022-03-03 RX ADMIN — DOXYCYCLINE HYCLATE 100 MG: 100 TABLET, COATED ORAL at 08:49

## 2022-03-03 RX ADMIN — GUAIFENESIN 600 MG: 600 TABLET, EXTENDED RELEASE ORAL at 08:49

## 2022-03-03 RX ADMIN — GABAPENTIN 600 MG: 300 CAPSULE ORAL at 08:49

## 2022-03-03 RX ADMIN — IPRATROPIUM BROMIDE AND ALBUTEROL SULFATE 1 AMPULE: 2.5; .5 SOLUTION RESPIRATORY (INHALATION) at 11:13

## 2022-03-03 RX ADMIN — ONDANSETRON HYDROCHLORIDE 4 MG: 2 INJECTION, SOLUTION INTRAMUSCULAR; INTRAVENOUS at 11:19

## 2022-03-03 ASSESSMENT — PAIN DESCRIPTION - PAIN TYPE: TYPE: ACUTE PAIN

## 2022-03-03 ASSESSMENT — PAIN DESCRIPTION - LOCATION: LOCATION: BACK

## 2022-03-03 ASSESSMENT — PAIN DESCRIPTION - PROGRESSION: CLINICAL_PROGRESSION: GRADUALLY WORSENING

## 2022-03-03 ASSESSMENT — PAIN SCALES - GENERAL
PAINLEVEL_OUTOF10: 5
PAINLEVEL_OUTOF10: 5

## 2022-03-03 ASSESSMENT — PAIN DESCRIPTION - FREQUENCY: FREQUENCY: CONTINUOUS

## 2022-03-03 ASSESSMENT — PAIN DESCRIPTION - ORIENTATION: ORIENTATION: LOWER;MID

## 2022-03-03 ASSESSMENT — PAIN - FUNCTIONAL ASSESSMENT: PAIN_FUNCTIONAL_ASSESSMENT: ACTIVITIES ARE NOT PREVENTED

## 2022-03-03 ASSESSMENT — PAIN DESCRIPTION - ONSET: ONSET: ON-GOING

## 2022-03-03 ASSESSMENT — PAIN DESCRIPTION - DESCRIPTORS: DESCRIPTORS: PRESSURE;SPASM

## 2022-03-03 NOTE — FLOWSHEET NOTE
03/03/22 0830   Vital Signs   Temp 97.8 °F (36.6 °C)   Temp Source Oral   Pulse 90   Heart Rate Source Monitor   Resp 18   /66   BP Location Right upper arm   Patient Position High fowlers   Level of Consciousness Alert (0)   MEWS Score 1   Patient Currently in Pain Yes   Pain Assessment   Pain Assessment 0-10   Pain Level 5   Pain Location Back   Pain Type Acute pain   Pain Orientation Lower;Mid   Pain Descriptors Pressure;Spasm   Pain Frequency Continuous   Patient's Stated Pain Goal No pain   Pain Onset On-going   Clinical Progression Gradually worsening   Functional Pain Assessment Activities are not prevented   Non-Pharmaceutical Pain Intervention(s) Repositioned; Heat applied   Oxygen Therapy   SpO2 92 %   Pulse Oximeter Device Mode Intermittent   Pulse Oximeter Device Location Left;Finger   O2 Device None (Room air)     AM assessment completed and vital signs completed, see flowsheet. Vital signs are WNL. Patient is alert & oriented. Patient complains of 5/10 back pain. PRN medications administered, see MAR. Side rails up X 2. Bed in the lowest position. Call light within reach.

## 2022-03-03 NOTE — PLAN OF CARE
Problem: Infection:  Goal: Will remain free from infection  Description: Will remain free from infection  Outcome: Ongoing     Problem: Safety:  Goal: Free from accidental physical injury  Description: Free from accidental physical injury  Outcome: Ongoing  Goal: Free from intentional harm  Description: Free from intentional harm  Outcome: Ongoing     Problem: Daily Care:  Goal: Daily care needs are met  Description: Daily care needs are met  Outcome: Ongoing     Problem: Pain:  Goal: Patient's pain/discomfort is manageable  Description: Patient's pain/discomfort is manageable  Outcome: Ongoing  Goal: Pain level will decrease  Description: Pain level will decrease  3/2/2022 2242 by Gage Hayes RN  Outcome: Ongoing  3/2/2022 1412 by Christian Arroyo RN  Outcome: Ongoing  Goal: Control of acute pain  Description: Control of acute pain  Outcome: Ongoing  Goal: Control of chronic pain  Description: Control of chronic pain  Outcome: Ongoing     Problem: Skin Integrity:  Goal: Skin integrity will stabilize  Description: Skin integrity will stabilize  Outcome: Ongoing     Problem: Discharge Planning:  Goal: Patients continuum of care needs are met  Description: Patients continuum of care needs are met  3/2/2022 2242 by Gage Hayes RN  Outcome: Ongoing  3/2/2022 1412 by Christian Arroyo RN  Outcome: Ongoing     Problem: Falls - Risk of:  Goal: Will remain free from falls  Description: Will remain free from falls  3/2/2022 2242 by Gage Hayes RN  Outcome: Ongoing  3/2/2022 1412 by Christian Arroyo RN  Outcome: Ongoing  Goal: Absence of physical injury  Description: Absence of physical injury  Outcome: Ongoing

## 2022-03-03 NOTE — PROGRESS NOTES
IV removed and discharge instructions completed. All questions were answered to patients satisfaction. All personal belongs packed. Awaiting for [facility]s to Beds to bring medications to patient. Update 1438: Meds to bed completed. Transport placed.

## 2022-03-07 NOTE — ADT AUTH CERT
Utilization Reviews         Chronic Obstructive Pulmonary Disease - Care Day 4 (3/2/2022) by Javon Burnette RN       Review Status Review Entered   Completed 3/2/2022 15:11      Criteria Review      Care Day: 4 Care Date: 3/2/2022 Level of Care: Inpatient Floor    Guideline Day 3    Clinical Status    (X) * Hemodynamic stability    3/2/2022 3:11 PM EST by [place]      p-87  bp-103/69    (X) * Mental status at baseline    3/2/2022 3:11 PM EST by [place]      Oriented x 3    ( ) * No evidence of infection, or outpatient treatment planned    ( ) * Uncompensated acidosis absent    (X) * Oxygenation at baseline or acceptable for next level of care    3/2/2022 3:11 PM EST by [place]      93% on ra    ( ) * Signs and symptoms of COPD (eg, dyspnea, Tachypnea, cough, sputum production) at baseline or acceptable for next level of care    3/2/2022 3:11 PM EST by [place]      LS diminished with diffuse wheezes  scant blood in sputum last evening    ( ) * Discharge plans and education understood    Activity    ( ) * Ambulatory or acceptable for next level of care    Routes    ( ) * Oral hydration    ( ) * Oral medications or regimen acceptable for next level of care    ( ) * Oral diet or acceptable for next level of care    Interventions    (X) Pulse oximetry    3/2/2022 3:11 PM EST by [place]      93% on ra    Medications    (X) * Inhaled bronchodilator regimen established and feasible at next level of care    3/2/2022 3:11 PM EST by Angelica boone in  duoneb in    ( ) Oral steroids    3/2/2022 3:11 PM EST by [place]      solumedrol iv    (X) Possible oral antibiotics    3/2/2022 3:11 PM EST by [employer] [place]      doxycycline po    * Milestone   Additional Notes   DATE: 3/2/22      Vitals:   T-98.1   P-87   R-20   Bp-103/69   R4psi-07% on ra       Abnl/Pertinent Labs/Radiology/Diagnostic Studies:      3/2/2022 05:13   GLUCOSE, FASTING,GF: 144 (H)   Hemoglobin Quant: 11.6 (L)   Hematocrit: 34.8 (L)   Neutrophils Absolute: 7.9 (H)   Lymphocytes Absolute: 0.8 (L)      Physical Exam:   Gen: No distress.  Alert. Eyes: PERRL. No sclera icterus. No conjunctival injection. ENT: No discharge. Pharynx clear. Neck: Trachea midline. Resp: No accessory muscle use. No crackles. LS diminished with diffuse wheezes. No rhonchi. CV: Regular rate. Regular rhythm. No murmur. No rub. No edema. GI: Non-tender. Non-distended. Normal bowel sounds. No hernia. Skin: Warm and dry. No nodule on exposed extremities. No rash on exposed extremities. M/S: No cyanosis. No joint deformity. No clubbing. Neuro: Awake. Grossly nonfocal     Psych: Oriented x 3. No anxiety or agitation      MD Consults/Assessments & Plans:   INT MED:  #COPD exacerbation   #Hypoxia   - does not use home O2.  Required up to 2L.  On RA today    - no infiltrate on imaging   - prednisone -> solumedrol 40 mg BID    - on doxy D#2/5   - inhaled bronchodilators scheduled and PRN   - checked sputum cultures - no growth so far    - cont Symbicort    - try zanaflex for muscle spasms       #Elevated BP without diagnosis of Hypertension -resolved.       #Fibromyalgia   #DDD   - cont Gabapentin       #GERD   - cont PPI       #Hyperlipidemia   - on Atorvastatin       #Iron deficiency anemia   - cont ferrous sulfate       #Anxiety   - cont Buspar, Atarax.        Medications:   atorvastatin (LIPITOR) tablet 20 mg   Dose: 20 mg   Freq: DAILY Route: PO      budesonide-formoterol (SYMBICORT) 160-4.5 MCG/ACT inhaler 2 puff   Dose: 2 puff   Freq: 2 TIMES DAILY Route: IN      busPIRone (BUSPAR) tablet 10 mg   Dose: 10 mg   Freq: 3 TIMES DAILY Route: PO      doxycycline hyclate (VIBRA-TABS) tablet 100 mg   Dose: 100 mg   Freq: 2 times per day Route: PO      enoxaparin (LOVENOX) injection 40 mg   Dose: 40 mg   Freq: DAILY Route: SC      ferrous sulfate (IRON 325) tablet 325 mg   Dose: 325 mg   Freq: 2 TIMES DAILY Route: PO      fluticasone (FLONASE) 50 MCG/ACT nasal spray 2 spray   Dose: 2 spray   Freq: DAILY Route: EACH Nostril      gabapentin (NEURONTIN) capsule 600 mg   Dose: 600 mg   Freq: 3 TIMES DAILY Route: PO      guaiFENesin (MUCINEX) extended release tablet 600 mg   Dose: 600 mg   Freq: 2 TIMES DAILY Route: PO      ipratropium-albuterol (DUONEB) nebulizer solution 1 ampule   Dose: 1 ampule   Freq: EVERY 4 HOURS WHILE AWAKE Route: IN      methylPREDNISolone sodium (SOLU-MEDROL) injection 40 mg   Dose: 40 mg   Freq: EVERY 12 HOURS Route: IV      montelukast (SINGULAIR) tablet 10 mg   Dose: 10 mg   Freq: NIGHTLY Route: PO      pantoprazole (PROTONIX) tablet 40 mg   Dose: 40 mg   Freq: DAILY BEFORE BREAKFAST Route: PO      traZODone (DESYREL) tablet 100 mg   Dose: 100 mg    Freq: NIGHTLY Route: PO      acetaminophen (TYLENOL) tablet 650 mg   Dose: 650 mg   Freq: EVERY 6 HOURS PRN Route: PO x1      tiZANidine (ZANAFLEX) tablet 4 mg   Dose: 4 mg   Freq: EVERY 6 HOURS PRN Route: POx1      Orders:   Regular diet        Chronic Obstructive Pulmonary Disease - Care Day 3 (3/1/2022) by Shailesh Cevallos RN       Review Status Review Entered   Completed 3/2/2022 10:57      Criteria Review      Care Day: 3 Care Date: 3/1/2022 Level of Care: Inpatient Floor    Guideline Day 3    Clinical Status    ( ) * Hemodynamic stability    3/2/2022 10:57 AM EST by Peter Guajardo      p-100  bp-92/51    ( ) * Mental status at baseline    ( ) * No evidence of infection, or outpatient treatment planned    ( ) * Uncompensated acidosis absent    ( ) * Oxygenation at baseline or acceptable for next level of care    3/2/2022 10:57 AM EST by Peter Guajardo      94% on 2L NC    ( ) * Signs and symptoms of COPD (eg, dyspnea, Tachypnea, cough, sputum production) at baseline or acceptable for next level of care    ( ) * Discharge plans and education understood    Activity    ( ) * Ambulatory or acceptable for next level of care    Routes    ( ) * Oral hydration    ( ) * Oral medications or regimen acceptable for next level of care    ( ) * Oral diet or acceptable for next level of care    Interventions    (X) Oxygen as needed    3/2/2022 10:57 AM EST by Geena Muñoz      94% on 2L NC    Medications    (X) * Inhaled bronchodilator regimen established and feasible at next level of care    3/2/2022 10:57 AM EST by Geena Muñoz      duoneb IN  symbicort IN    ( ) Oral steroids    3/2/2022 10:57 AM EST by Geena Muñoz      solumedrol iv    (X) Possible oral antibiotics    3/2/2022 10:57 AM EST by Geena Muñoz      doxycycline po    * Milestone   Additional Notes   DATE: 3/1/22      Vitals:   T-99.7   P-100   R-20   Bp-92/51   G6uhj-69% on 2L NC       Physical Exam:   Gen: + mild distress. Alert. Eyes: PERRL. No sclera icterus. No conjunctival injection. ENT: No discharge. Pharynx clear. Neck: Trachea midline. Resp: + accessory muscle use. No crackles. LS diminished with diffuse wheezes. No rhonchi. CV: Regular rate. Regular rhythm. No murmur. No rub. No edema. GI: Non-tender. Non-distended. Normal bowel sounds. No hernia. Skin: Warm and dry. No nodule on exposed extremities. No rash on exposed extremities. M/S: No cyanosis. No joint deformity. No clubbing. Neuro: Awake. Grossly nonfocal     Psych: Oriented x 3. No anxiety or agitation      MD Consults/Assessments & Plans:   INT MED:  #COPD exacerbation   #Hypoxia   - does not use home O2.  Required up to 2L    - no infiltrate on imaging   - prednisone -> solumedrol 40 mg BID    - start doxy D#1   - inhaled bronchodilators scheduled and PRN   - check sputum cultures    - cont Symbicort    - try zanaflex for muscle spasms       #Elevated BP without diagnosis of Hypertension -resolved.       #Fibromyalgia   #DDD   - cont Gabapentin       #GERD   - cont PPI       #Hyperlipidemia   - on Atorvastatin       #Iron deficiency anemia   - cont ferrous sulfate       #Anxiety   - cont Buspar, Atarax.        Medications: atorvastatin (LIPITOR) tablet 20 mg   Dose: 20 mg   Freq: DAILY Route: PO      budesonide-formoterol (SYMBICORT) 160-4.5 MCG/ACT inhaler 2 puff   Dose: 2 puff   Freq: 2 TIMES DAILY Route: IN      busPIRone (BUSPAR) tablet 10 mg   Dose: 10 mg   Freq: 3 TIMES DAILY Route: PO      doxycycline hyclate (VIBRA-TABS) tablet 100 mg   Dose: 100 mg   Freq: 2 times per day Route: PO      enoxaparin (LOVENOX) injection 40 mg   Dose: 40 mg   Freq: DAILY Route: SC      ferrous sulfate (IRON 325) tablet 325 mg   Dose: 325 mg   Freq: 2 TIMES DAILY Route: PO      fluticasone (FLONASE) 50 MCG/ACT nasal spray 2 spray   Dose: 2 spray   Freq: DAILY Route: EACH Nostril      gabapentin (NEURONTIN) capsule 600 mg   Dose: 600 mg   Freq: 3 TIMES DAILY Route: PO      guaiFENesin (MUCINEX) extended release tablet 600 mg   Dose: 600 mg   Freq: 2 TIMES DAILY Route: PO      ipratropium-albuterol (DUONEB) nebulizer solution 1 ampule   Dose: 1 ampule   Freq: EVERY 4 HOURS WHILE AWAKE Route: IN      methylPREDNISolone sodium (SOLU-MEDROL) injection 40 mg   Dose: 40 mg   Freq: EVERY 12 HOURS Route: IV      montelukast (SINGULAIR) tablet 10 mg   Dose: 10 mg   Freq: NIGHTLY Route: PO      pantoprazole (PROTONIX) tablet 40 mg   Dose: 40 mg   Freq: DAILY BEFORE BREAKFAST Route: PO      tiZANidine (ZANAFLEX) tablet 4 mg   Dose: 4 mg   Freq: ONCE Route: PO   Then    traZODone (DESYREL) tablet 100 mg   Dose: 100 mg   Freq: NIGHTLY Route: PO      acetaminophen (TYLENOL) tablet 650 mg   Dose: 650 mg   Freq: EVERY 6 HOURS PRN Route: Pox2      albuterol (PROVENTIL) nebulizer solution 2.5 mg   Dose: 2.5 mg   Freq: EVERY 2 HOURS PRN Route: NEBULIZATION x1      ondansetron (ZOFRAN-ODT) disintegrating tablet 4 mg   Dose: 4 mg   Freq: EVERY 8 HOURS PRN Route: Pox1      ondansetron (ZOFRAN) injection 4 mg   Dose: 4 mg   Freq: EVERY 6 HOURS PRN Route: IV x1      polyethylene glycol (GLYCOLAX) packet 17 g   Dose: 17 g   Freq: DAILY PRN Route: Pox1      tiZANidine (ZANAFLEX) tablet 4 mg   Dose: 4 mg   Freq: EVERY 6 HOURS PRN Route: PO x1

## 2022-05-05 ENCOUNTER — TELEPHONE (OUTPATIENT)
Dept: PULMONOLOGY | Age: 67
End: 2022-05-05

## 2022-08-05 ENCOUNTER — TELEPHONE (OUTPATIENT)
Dept: PULMONOLOGY | Age: 67
End: 2022-08-05

## 2022-08-05 NOTE — TELEPHONE ENCOUNTER
Patient did not show for 6 mo fu  with Dr. Asia Khanna on 8/5/22. Reason:  na    This is patient's second no show. Patient was ano show on: 5.5.22. Patient did not reschedule.   Reschedule date:  na

## 2023-01-10 NOTE — TELEPHONE ENCOUNTER
Patient did not show for 6 mo f/u  with Dr. Eran Anderson on 5/5/22. Reason:  na    This is patient's first no show. Patient was ano show on: na.      Patient did not reschedule.   Reschedule date:  na 74

## 2023-03-24 NOTE — CONSULTS
LastVisit 2/28/2023   LastLabs  11/28/2023  NextVisit 4/25/2023   LastRefilled 02/28/2023 246 07/05/2020    MCV 90.4 07/05/2020    MCH 29.6 07/05/2020    MCHC 32.7 07/05/2020    RDW 13.7 07/05/2020    LYMPHOPCT 30.5 07/05/2020    MONOPCT 10.6 07/05/2020    BASOPCT 1.0 07/05/2020    MONOSABS 0.9 07/05/2020    LYMPHSABS 2.5 07/05/2020    EOSABS 0.2 07/05/2020    BASOSABS 0.1 07/05/2020     CMP:    Lab Results   Component Value Date     07/06/2020    K 4.4 07/06/2020     07/06/2020    CO2 22 07/06/2020    BUN 10 07/06/2020    CREATININE 0.7 07/06/2020    GFRAA >60 07/06/2020    AGRATIO 1.9 07/05/2020    LABGLOM >60 07/06/2020    GLUCOSE 125 07/06/2020    PROT 6.7 07/05/2020    CALCIUM 8.4 07/06/2020    BILITOT 0.3 07/05/2020    ALKPHOS 86 07/05/2020    AST 13 07/05/2020    ALT 6 07/05/2020     BMP:    Lab Results   Component Value Date     07/06/2020    K 4.4 07/06/2020     07/06/2020    CO2 22 07/06/2020    BUN 10 07/06/2020    CREATININE 0.7 07/06/2020    CALCIUM 8.4 07/06/2020    GFRAA >60 07/06/2020    LABGLOM >60 07/06/2020    GLUCOSE 125 07/06/2020       Radiology:     @  CT HEAD WO CONTRAST   Final Result   No acute intracranial abnormality. XR KNEE RIGHT (3 VIEWS)   Final Result   Displaced, comminuted periprosthetic fracture of the distal femoral   metaphysis at the proximal margin of the femoral component of the right knee   prosthesis. IMPRESSION/RECOMMENDATIONS:    Assessment: Right distal femur periprosthetic fracture    Plan:  1) Imaging findings reviewed with patient. Conservative and surgical treatment options discussed. Given the unstable nature of her fracture surgical intervention is indicated. After discussion today with the family and patient they elected to proceed with surgical intervention. The surgical procedure was discussed in detail. The risks and possible complications of the surgery in general, as well as those directly related to this procedure were reviewed today.  General risks include but are not limited to persistent pain, infection, excessive blood loss, neurovascular injury, chronic swelling or edema, and the potential need for additional treatment or surgical intervention in the future. The patient understands that, again, the risks and possible complications are not limited to those specifically stated above. Due to the patients advanced age and osteopenia they are at increased risk for hardware failure, non union and death. The patient accepted the above risks, possible complications and elects to proceed. All questions were answered appropriately, and they understand that they can contact me at any time to further discuss any questions or concerns. The patient was counseled at length about the risks of hailey Covid-19 during their perioperative period and any recovery window from their procedure. The patient was made aware that hailey Covid-19  may worsen their prognosis for recovering from their procedure  and lend to a higher morbidity and/or mortality risk. All material risks, benefits, and reasonable alternatives including postponing the procedure were discussed. The patient does wish to proceed with the procedure at this time. 2) NPO, consent obtained, abx on call to OR  3) patient medically optimized  4) will continue to follow post operatively, will need SW for D/C planning        Thank you for the opportunity to consult on this patient.      Poppy Young

## 2024-03-28 ENCOUNTER — HOSPITAL ENCOUNTER (OUTPATIENT)
Dept: GENERAL RADIOLOGY | Age: 69
Discharge: HOME | End: 2024-03-28
Payer: MEDICARE

## 2024-03-28 ENCOUNTER — HOSPITAL ENCOUNTER (OUTPATIENT)
Age: 69
Discharge: HOME | End: 2024-03-28
Payer: MEDICARE

## 2024-03-28 DIAGNOSIS — M79.675 PAIN OF TOE OF LEFT FOOT: ICD-10-CM

## 2024-03-28 PROCEDURE — 73660 X-RAY EXAM OF TOE(S): CPT

## 2024-05-08 ENCOUNTER — HOSPITAL ENCOUNTER (INPATIENT)
Age: 69
LOS: 13 days | Discharge: HOME HEALTH | DRG: 193 | End: 2024-05-21
Attending: EMERGENCY MEDICINE | Admitting: INTERNAL MEDICINE
Payer: MEDICARE

## 2024-05-08 ENCOUNTER — APPOINTMENT (OUTPATIENT)
Dept: GENERAL RADIOLOGY | Age: 69
DRG: 193 | End: 2024-05-08
Payer: MEDICARE

## 2024-05-08 ENCOUNTER — APPOINTMENT (OUTPATIENT)
Dept: CT IMAGING | Age: 69
DRG: 193 | End: 2024-05-08
Payer: MEDICARE

## 2024-05-08 DIAGNOSIS — B44.89 ASPERGILLUS FUMIGATUS (HCC): ICD-10-CM

## 2024-05-08 DIAGNOSIS — F41.9 ANXIETY: ICD-10-CM

## 2024-05-08 DIAGNOSIS — J18.9 ATYPICAL PNEUMONIA: ICD-10-CM

## 2024-05-08 DIAGNOSIS — R06.03 ACUTE RESPIRATORY DISTRESS: Primary | ICD-10-CM

## 2024-05-08 DIAGNOSIS — E87.20 LACTIC ACIDOSIS: ICD-10-CM

## 2024-05-08 DIAGNOSIS — J44.1 COPD EXACERBATION (HCC): ICD-10-CM

## 2024-05-08 DIAGNOSIS — E87.20 METABOLIC ACIDOSIS: ICD-10-CM

## 2024-05-08 PROBLEM — J44.9 COPD (CHRONIC OBSTRUCTIVE PULMONARY DISEASE) (HCC): Status: ACTIVE | Noted: 2024-05-08

## 2024-05-08 LAB
ALBUMIN SERPL-MCNC: 5.2 G/DL (ref 3.4–5)
ALBUMIN/GLOB SERPL: 1.5 {RATIO} (ref 1.1–2.2)
ALP SERPL-CCNC: 133 U/L (ref 40–129)
ALT SERPL-CCNC: 28 U/L (ref 10–40)
ANION GAP SERPL CALCULATED.3IONS-SCNC: 17 MMOL/L (ref 3–16)
AST SERPL-CCNC: 31 U/L (ref 15–37)
BASE EXCESS BLDV CALC-SCNC: -6.6 MMOL/L (ref -3–3)
BASE EXCESS BLDV CALC-SCNC: -9.5 MMOL/L (ref -3–3)
BASOPHILS # BLD: 0 K/UL (ref 0–0.2)
BASOPHILS NFR BLD: 0 %
BILIRUB SERPL-MCNC: 0.4 MG/DL (ref 0–1)
BUN SERPL-MCNC: 36 MG/DL (ref 7–20)
CALCIUM SERPL-MCNC: 10.2 MG/DL (ref 8.3–10.6)
CHLORIDE SERPL-SCNC: 96 MMOL/L (ref 99–110)
CO2 BLDV-SCNC: 20 MMOL/L
CO2 BLDV-SCNC: 21 MMOL/L
CO2 SERPL-SCNC: 21 MMOL/L (ref 21–32)
COHGB MFR BLDV: 3 % (ref 0–1.5)
COHGB MFR BLDV: 3.8 % (ref 0–1.5)
CREAT SERPL-MCNC: 0.9 MG/DL (ref 0.6–1.2)
DEPRECATED RDW RBC AUTO: 15.3 % (ref 12.4–15.4)
EKG ATRIAL RATE: 96 BPM
EKG DIAGNOSIS: NORMAL
EKG P AXIS: 87 DEGREES
EKG P-R INTERVAL: 130 MS
EKG Q-T INTERVAL: 330 MS
EKG QRS DURATION: 84 MS
EKG QTC CALCULATION (BAZETT): 416 MS
EKG R AXIS: 71 DEGREES
EKG T AXIS: 76 DEGREES
EKG VENTRICULAR RATE: 96 BPM
EOSINOPHIL # BLD: 0 K/UL (ref 0–0.6)
EOSINOPHIL NFR BLD: 0 %
FLUAV RNA RESP QL NAA+PROBE: NOT DETECTED
FLUBV RNA RESP QL NAA+PROBE: NOT DETECTED
GFR SERPLBLD CREATININE-BSD FMLA CKD-EPI: 70 ML/MIN/{1.73_M2}
GLUCOSE SERPL-MCNC: 122 MG/DL (ref 70–99)
HCO3 BLDV-SCNC: 18.9 MMOL/L (ref 23–29)
HCO3 BLDV-SCNC: 19.4 MMOL/L (ref 23–29)
HCT VFR BLD AUTO: 52.3 % (ref 36–48)
HGB BLD-MCNC: 17.1 G/DL (ref 12–16)
LACTATE BLDV-SCNC: 1.2 MMOL/L (ref 0.4–2)
LACTATE BLDV-SCNC: 3.7 MMOL/L (ref 0.4–2)
LYMPHOCYTES # BLD: 2.1 K/UL (ref 1–5.1)
LYMPHOCYTES NFR BLD: 9 %
MACROCYTES BLD QL SMEAR: ABNORMAL
MCH RBC QN AUTO: 29 PG (ref 26–34)
MCHC RBC AUTO-ENTMCNC: 32.7 G/DL (ref 31–36)
MCV RBC AUTO: 88.7 FL (ref 80–100)
METHGB MFR BLDV: 0.1 %
METHGB MFR BLDV: 0.3 %
MICROCYTES BLD QL SMEAR: ABNORMAL
MONOCYTES # BLD: 0.8 K/UL (ref 0–1.3)
MONOCYTES NFR BLD: 5 %
NEUTROPHILS # BLD: 13.5 K/UL (ref 1.7–7.7)
NEUTROPHILS NFR BLD: 79 %
NEUTS BAND NFR BLD MANUAL: 3 % (ref 0–7)
NT-PROBNP SERPL-MCNC: 362 PG/ML (ref 0–124)
O2 THERAPY: ABNORMAL
O2 THERAPY: ABNORMAL
PCO2 BLDV: 40.6 MMHG (ref 40–50)
PCO2 BLDV: 49.8 MMHG (ref 40–50)
PH BLDV: 7.2 [PH] (ref 7.35–7.45)
PH BLDV: 7.3 [PH] (ref 7.35–7.45)
PLATELET # BLD AUTO: 428 K/UL (ref 135–450)
PLATELET BLD QL SMEAR: ADEQUATE
PMV BLD AUTO: 6.9 FL (ref 5–10.5)
PO2 BLDV: 32.5 MMHG (ref 25–40)
PO2 BLDV: 48 MMHG (ref 25–40)
POTASSIUM SERPL-SCNC: 3.9 MMOL/L (ref 3.5–5.1)
PROT SERPL-MCNC: 8.6 G/DL (ref 6.4–8.2)
RBC # BLD AUTO: 5.89 M/UL (ref 4–5.2)
SAO2 % BLDV: 57 %
SAO2 % BLDV: 83 %
SARS-COV-2 RNA RESP QL NAA+PROBE: NOT DETECTED
SLIDE REVIEW: ABNORMAL
SODIUM SERPL-SCNC: 134 MMOL/L (ref 136–145)
TROPONIN, HIGH SENSITIVITY: 10 NG/L (ref 0–14)
TROPONIN, HIGH SENSITIVITY: 15 NG/L (ref 0–14)
VARIANT LYMPHS NFR BLD MANUAL: 4 % (ref 0–6)
WBC # BLD AUTO: 16.5 K/UL (ref 4–11)

## 2024-05-08 PROCEDURE — 82803 BLOOD GASES ANY COMBINATION: CPT

## 2024-05-08 PROCEDURE — 2580000003 HC RX 258: Performed by: INTERNAL MEDICINE

## 2024-05-08 PROCEDURE — 99285 EMERGENCY DEPT VISIT HI MDM: CPT

## 2024-05-08 PROCEDURE — 96365 THER/PROPH/DIAG IV INF INIT: CPT

## 2024-05-08 PROCEDURE — 96367 TX/PROPH/DG ADDL SEQ IV INF: CPT

## 2024-05-08 PROCEDURE — 6370000000 HC RX 637 (ALT 250 FOR IP): Performed by: EMERGENCY MEDICINE

## 2024-05-08 PROCEDURE — 84484 ASSAY OF TROPONIN QUANT: CPT

## 2024-05-08 PROCEDURE — 87040 BLOOD CULTURE FOR BACTERIA: CPT

## 2024-05-08 PROCEDURE — 1200000000 HC SEMI PRIVATE

## 2024-05-08 PROCEDURE — 2580000003 HC RX 258: Performed by: EMERGENCY MEDICINE

## 2024-05-08 PROCEDURE — 71045 X-RAY EXAM CHEST 1 VIEW: CPT

## 2024-05-08 PROCEDURE — 36415 COLL VENOUS BLD VENIPUNCTURE: CPT

## 2024-05-08 PROCEDURE — 6360000004 HC RX CONTRAST MEDICATION: Performed by: EMERGENCY MEDICINE

## 2024-05-08 PROCEDURE — 93010 ELECTROCARDIOGRAM REPORT: CPT | Performed by: INTERNAL MEDICINE

## 2024-05-08 PROCEDURE — 6360000002 HC RX W HCPCS: Performed by: EMERGENCY MEDICINE

## 2024-05-08 PROCEDURE — 85025 COMPLETE CBC W/AUTO DIFF WBC: CPT

## 2024-05-08 PROCEDURE — 71260 CT THORAX DX C+: CPT

## 2024-05-08 PROCEDURE — 6360000002 HC RX W HCPCS: Performed by: INTERNAL MEDICINE

## 2024-05-08 PROCEDURE — 6370000000 HC RX 637 (ALT 250 FOR IP): Performed by: NURSE PRACTITIONER

## 2024-05-08 PROCEDURE — 80053 COMPREHEN METABOLIC PANEL: CPT

## 2024-05-08 PROCEDURE — 96375 TX/PRO/DX INJ NEW DRUG ADDON: CPT

## 2024-05-08 PROCEDURE — 87636 SARSCOV2 & INF A&B AMP PRB: CPT

## 2024-05-08 PROCEDURE — 6370000000 HC RX 637 (ALT 250 FOR IP): Performed by: INTERNAL MEDICINE

## 2024-05-08 PROCEDURE — 83605 ASSAY OF LACTIC ACID: CPT

## 2024-05-08 PROCEDURE — 83880 ASSAY OF NATRIURETIC PEPTIDE: CPT

## 2024-05-08 PROCEDURE — 93005 ELECTROCARDIOGRAM TRACING: CPT | Performed by: EMERGENCY MEDICINE

## 2024-05-08 PROCEDURE — 94640 AIRWAY INHALATION TREATMENT: CPT

## 2024-05-08 RX ORDER — IPRATROPIUM BROMIDE AND ALBUTEROL SULFATE 2.5; .5 MG/3ML; MG/3ML
1 SOLUTION RESPIRATORY (INHALATION) EVERY 4 HOURS PRN
Status: DISCONTINUED | OUTPATIENT
Start: 2024-05-08 | End: 2024-05-11

## 2024-05-08 RX ORDER — ONDANSETRON 2 MG/ML
4 INJECTION INTRAMUSCULAR; INTRAVENOUS EVERY 6 HOURS PRN
Status: DISCONTINUED | OUTPATIENT
Start: 2024-05-08 | End: 2024-05-21 | Stop reason: HOSPADM

## 2024-05-08 RX ORDER — ACETAMINOPHEN 650 MG/1
650 SUPPOSITORY RECTAL EVERY 6 HOURS PRN
Status: DISCONTINUED | OUTPATIENT
Start: 2024-05-08 | End: 2024-05-21 | Stop reason: HOSPADM

## 2024-05-08 RX ORDER — SODIUM CHLORIDE 9 MG/ML
INJECTION, SOLUTION INTRAVENOUS PRN
Status: DISCONTINUED | OUTPATIENT
Start: 2024-05-08 | End: 2024-05-21 | Stop reason: HOSPADM

## 2024-05-08 RX ORDER — IPRATROPIUM BROMIDE AND ALBUTEROL SULFATE 2.5; .5 MG/3ML; MG/3ML
1 SOLUTION RESPIRATORY (INHALATION) ONCE
Status: COMPLETED | OUTPATIENT
Start: 2024-05-08 | End: 2024-05-08

## 2024-05-08 RX ORDER — ACETAMINOPHEN 325 MG/1
650 TABLET ORAL EVERY 6 HOURS PRN
Status: DISCONTINUED | OUTPATIENT
Start: 2024-05-08 | End: 2024-05-21 | Stop reason: HOSPADM

## 2024-05-08 RX ORDER — AZITHROMYCIN 250 MG/1
250 TABLET, FILM COATED ORAL DAILY
Status: COMPLETED | OUTPATIENT
Start: 2024-05-09 | End: 2024-05-12

## 2024-05-08 RX ORDER — 0.9 % SODIUM CHLORIDE 0.9 %
500 INTRAVENOUS SOLUTION INTRAVENOUS ONCE
Status: COMPLETED | OUTPATIENT
Start: 2024-05-08 | End: 2024-05-08

## 2024-05-08 RX ORDER — PANTOPRAZOLE SODIUM 40 MG/1
40 TABLET, DELAYED RELEASE ORAL DAILY
Status: DISCONTINUED | OUTPATIENT
Start: 2024-05-09 | End: 2024-05-16

## 2024-05-08 RX ORDER — HYDROCODONE BITARTRATE AND ACETAMINOPHEN 5; 325 MG/1; MG/1
1 TABLET ORAL EVERY 6 HOURS PRN
COMMUNITY

## 2024-05-08 RX ORDER — DEXAMETHASONE SODIUM PHOSPHATE 4 MG/ML
8 INJECTION, SOLUTION INTRA-ARTICULAR; INTRALESIONAL; INTRAMUSCULAR; INTRAVENOUS; SOFT TISSUE ONCE
Status: COMPLETED | OUTPATIENT
Start: 2024-05-08 | End: 2024-05-08

## 2024-05-08 RX ORDER — GABAPENTIN 300 MG/1
600 CAPSULE ORAL
Status: DISCONTINUED | OUTPATIENT
Start: 2024-05-08 | End: 2024-05-14

## 2024-05-08 RX ORDER — ONDANSETRON 4 MG/1
4 TABLET, ORALLY DISINTEGRATING ORAL EVERY 8 HOURS PRN
Status: DISCONTINUED | OUTPATIENT
Start: 2024-05-08 | End: 2024-05-21 | Stop reason: HOSPADM

## 2024-05-08 RX ORDER — MONTELUKAST SODIUM 10 MG/1
10 TABLET ORAL NIGHTLY
Status: DISCONTINUED | OUTPATIENT
Start: 2024-05-08 | End: 2024-05-21 | Stop reason: HOSPADM

## 2024-05-08 RX ORDER — CETIRIZINE HYDROCHLORIDE 10 MG/1
10 TABLET ORAL DAILY
COMMUNITY

## 2024-05-08 RX ORDER — SODIUM CHLORIDE 0.9 % (FLUSH) 0.9 %
5-40 SYRINGE (ML) INJECTION PRN
Status: DISCONTINUED | OUTPATIENT
Start: 2024-05-08 | End: 2024-05-21 | Stop reason: HOSPADM

## 2024-05-08 RX ORDER — TRAZODONE HYDROCHLORIDE 50 MG/1
100 TABLET ORAL NIGHTLY PRN
Status: DISCONTINUED | OUTPATIENT
Start: 2024-05-08 | End: 2024-05-21 | Stop reason: HOSPADM

## 2024-05-08 RX ORDER — MORPHINE SULFATE 4 MG/ML
4 INJECTION, SOLUTION INTRAMUSCULAR; INTRAVENOUS ONCE
Status: COMPLETED | OUTPATIENT
Start: 2024-05-08 | End: 2024-05-08

## 2024-05-08 RX ORDER — HYDROXYZINE HYDROCHLORIDE 25 MG/1
50 TABLET, FILM COATED ORAL 3 TIMES DAILY PRN
Status: DISCONTINUED | OUTPATIENT
Start: 2024-05-08 | End: 2024-05-19

## 2024-05-08 RX ORDER — POLYETHYLENE GLYCOL 3350 17 G/17G
17 POWDER, FOR SOLUTION ORAL DAILY PRN
Status: DISCONTINUED | OUTPATIENT
Start: 2024-05-08 | End: 2024-05-21 | Stop reason: HOSPADM

## 2024-05-08 RX ORDER — ATORVASTATIN CALCIUM 10 MG/1
20 TABLET, FILM COATED ORAL DAILY
Status: DISCONTINUED | OUTPATIENT
Start: 2024-05-09 | End: 2024-05-21 | Stop reason: HOSPADM

## 2024-05-08 RX ORDER — HYDROCODONE BITARTRATE AND HOMATROPINE METHYLBROMIDE ORAL SOLUTION 5; 1.5 MG/5ML; MG/5ML
5 LIQUID ORAL EVERY 4 HOURS PRN
Status: COMPLETED | OUTPATIENT
Start: 2024-05-08 | End: 2024-05-10

## 2024-05-08 RX ORDER — ENOXAPARIN SODIUM 100 MG/ML
40 INJECTION SUBCUTANEOUS DAILY
Status: DISCONTINUED | OUTPATIENT
Start: 2024-05-08 | End: 2024-05-21 | Stop reason: HOSPADM

## 2024-05-08 RX ORDER — SODIUM CHLORIDE 0.9 % (FLUSH) 0.9 %
5-40 SYRINGE (ML) INJECTION EVERY 12 HOURS SCHEDULED
Status: DISCONTINUED | OUTPATIENT
Start: 2024-05-08 | End: 2024-05-21 | Stop reason: HOSPADM

## 2024-05-08 RX ORDER — ONDANSETRON 2 MG/ML
4 INJECTION INTRAMUSCULAR; INTRAVENOUS ONCE
Status: COMPLETED | OUTPATIENT
Start: 2024-05-08 | End: 2024-05-08

## 2024-05-08 RX ORDER — ASCORBIC ACID
250 CRYSTALS ORAL
COMMUNITY

## 2024-05-08 RX ORDER — TERIPARATIDE 250 UG/ML
20 INJECTION, SOLUTION SUBCUTANEOUS DAILY
COMMUNITY

## 2024-05-08 RX ORDER — HYDROCODONE BITARTRATE AND HOMATROPINE METHYLBROMIDE ORAL SOLUTION 5; 1.5 MG/5ML; MG/5ML
5 LIQUID ORAL EVERY 4 HOURS PRN
Status: DISCONTINUED | OUTPATIENT
Start: 2024-05-08 | End: 2024-05-08

## 2024-05-08 RX ORDER — CHOLECALCIFEROL (VITAMIN D3) 1250 MCG
1 CAPSULE ORAL
COMMUNITY

## 2024-05-08 RX ORDER — GUAIFENESIN 600 MG/1
600 TABLET, EXTENDED RELEASE ORAL 2 TIMES DAILY
Status: DISCONTINUED | OUTPATIENT
Start: 2024-05-08 | End: 2024-05-09

## 2024-05-08 RX ORDER — TIZANIDINE 4 MG/1
4 TABLET ORAL EVERY 6 HOURS PRN
Status: DISCONTINUED | OUTPATIENT
Start: 2024-05-08 | End: 2024-05-21 | Stop reason: HOSPADM

## 2024-05-08 RX ADMIN — IOPAMIDOL 75 ML: 755 INJECTION, SOLUTION INTRAVENOUS at 14:01

## 2024-05-08 RX ADMIN — DEXAMETHASONE SODIUM PHOSPHATE 8 MG: 4 INJECTION, SOLUTION INTRAMUSCULAR; INTRAVENOUS at 12:23

## 2024-05-08 RX ADMIN — MORPHINE SULFATE 4 MG: 4 INJECTION, SOLUTION INTRAMUSCULAR; INTRAVENOUS at 12:23

## 2024-05-08 RX ADMIN — GABAPENTIN 600 MG: 300 CAPSULE ORAL at 19:45

## 2024-05-08 RX ADMIN — HYDROCODONE BITARTRATE AND HOMATROPINE METHYLBROMIDE 5 ML: 5; 1.5 SOLUTION ORAL at 20:55

## 2024-05-08 RX ADMIN — ACETAMINOPHEN 650 MG: 325 TABLET ORAL at 16:54

## 2024-05-08 RX ADMIN — ENOXAPARIN SODIUM 40 MG: 100 INJECTION SUBCUTANEOUS at 16:54

## 2024-05-08 RX ADMIN — SODIUM CHLORIDE 500 ML: 9 INJECTION, SOLUTION INTRAVENOUS at 12:22

## 2024-05-08 RX ADMIN — IPRATROPIUM BROMIDE AND ALBUTEROL SULFATE 1 DOSE: .5; 2.5 SOLUTION RESPIRATORY (INHALATION) at 12:52

## 2024-05-08 RX ADMIN — GABAPENTIN 600 MG: 300 CAPSULE ORAL at 16:54

## 2024-05-08 RX ADMIN — AZITHROMYCIN MONOHYDRATE 500 MG: 500 INJECTION, POWDER, LYOPHILIZED, FOR SOLUTION INTRAVENOUS at 13:16

## 2024-05-08 RX ADMIN — MONTELUKAST SODIUM 10 MG: 10 TABLET, COATED ORAL at 19:45

## 2024-05-08 RX ADMIN — SODIUM CHLORIDE 500 ML: 9 INJECTION, SOLUTION INTRAVENOUS at 12:45

## 2024-05-08 RX ADMIN — Medication 10 ML: at 19:46

## 2024-05-08 RX ADMIN — GUAIFENESIN 600 MG: 600 TABLET ORAL at 19:45

## 2024-05-08 RX ADMIN — ONDANSETRON 4 MG: 2 INJECTION INTRAMUSCULAR; INTRAVENOUS at 12:22

## 2024-05-08 RX ADMIN — CEFTRIAXONE SODIUM 1000 MG: 1 INJECTION, POWDER, FOR SOLUTION INTRAMUSCULAR; INTRAVENOUS at 12:46

## 2024-05-08 ASSESSMENT — PAIN DESCRIPTION - LOCATION
LOCATION: ABDOMEN
LOCATION_2: ABDOMEN
LOCATION: BACK
LOCATION: BACK
LOCATION: BACK;ABDOMEN

## 2024-05-08 ASSESSMENT — PAIN DESCRIPTION - ORIENTATION
ORIENTATION: MID
ORIENTATION: MID

## 2024-05-08 ASSESSMENT — PAIN SCALES - GENERAL
PAINLEVEL_OUTOF10: 3
PAINLEVEL_OUTOF10: 5
PAINLEVEL_OUTOF10: 6
PAINLEVEL_OUTOF10: 0
PAINLEVEL_OUTOF10: 6
PAINLEVEL_OUTOF10: 6

## 2024-05-08 ASSESSMENT — LIFESTYLE VARIABLES
HOW MANY STANDARD DRINKS CONTAINING ALCOHOL DO YOU HAVE ON A TYPICAL DAY: PATIENT DOES NOT DRINK
HOW OFTEN DO YOU HAVE A DRINK CONTAINING ALCOHOL: NEVER

## 2024-05-08 ASSESSMENT — PAIN DESCRIPTION - PAIN TYPE: TYPE: ACUTE PAIN

## 2024-05-08 ASSESSMENT — PAIN DESCRIPTION - DESCRIPTORS
DESCRIPTORS: ACHING
DESCRIPTORS: SPASM

## 2024-05-08 ASSESSMENT — PAIN - FUNCTIONAL ASSESSMENT: PAIN_FUNCTIONAL_ASSESSMENT: 0-10

## 2024-05-08 ASSESSMENT — PAIN DESCRIPTION - INTENSITY: RATING_2: 4

## 2024-05-08 NOTE — ED PROVIDER NOTES
[facility]  ED  EMERGENCY DEPARTMENT ENCOUNTER        Pt Name: Valery Salcido  MRN: [id number]  Birthdate 1955  Date of evaluation: 5/8/2024  Provider: Peter Thrasher MD  PCP: Vivian Jay MD      CHIEF COMPLAINT       Chief Complaint   Patient presents with    Shortness of Breath     Pt reports SOB with exertion since Friday. Feels extremely winded with ambulation. Walked from the waiting area to triage. Heart rate increased to 137 however oxygen was at 95% upon arrival to the triage bay        HISTORY OFPRESENT ILLNESS   (Location/Symptom, Timing/Onset, Context/Setting, Quality, Duration, Modifying Factors,Severity)  Note limiting factors.     Valery Salcido is a 68 y.o. female presenting today due to concern for feeling severely short of breath with left-sided chest pain worse with coughing over the last couple of days.  She started not feeling well last Thursday with a cough and states that is progressively worsened to where she can barely catch her breath.  She does complain of some left upper abdominal discomfort associate with coughing as well.  She states her sputum is mainly yellow in color although does have some blood streaks within it.  She denies any history of blood clots.  She does have a history of smoking but quit.  She denies any fever.  No reported falls or trauma.  Due to worsening left-sided chest pain and not being able to get comfortable along with trouble breathing, she came to the emergency department for further evaluation.  She states if she tries to ambulate she gets short of breath very quickly.        REVIEW OF SYSTEMS    (2-9 systems for level 4, 10 or more for level 5)     Review of Systems   Constitutional:  Negative for fever.   HENT:  Positive for congestion.    Respiratory:  Positive for cough, chest tightness, shortness of breath and wheezing. Negative for stridor.    Cardiovascular:  Positive for chest pain and palpitations. Negative for leg

## 2024-05-09 PROBLEM — E43 SEVERE MALNUTRITION (HCC): Status: ACTIVE | Noted: 2024-05-09

## 2024-05-09 LAB
ALBUMIN SERPL-MCNC: 3.7 G/DL (ref 3.4–5)
ANION GAP SERPL CALCULATED.3IONS-SCNC: 9 MMOL/L (ref 3–16)
BUN SERPL-MCNC: 22 MG/DL (ref 7–20)
CALCIUM SERPL-MCNC: 9.1 MG/DL (ref 8.3–10.6)
CHLORIDE SERPL-SCNC: 104 MMOL/L (ref 99–110)
CO2 SERPL-SCNC: 24 MMOL/L (ref 21–32)
CREAT SERPL-MCNC: <0.5 MG/DL (ref 0.6–1.2)
DEPRECATED RDW RBC AUTO: 15 % (ref 12.4–15.4)
GFR SERPLBLD CREATININE-BSD FMLA CKD-EPI: >90 ML/MIN/{1.73_M2}
GLUCOSE SERPL-MCNC: 95 MG/DL (ref 70–99)
HCT VFR BLD AUTO: 38.6 % (ref 36–48)
HGB BLD-MCNC: 12.6 G/DL (ref 12–16)
MAGNESIUM SERPL-MCNC: 1.9 MG/DL (ref 1.8–2.4)
MCH RBC QN AUTO: 28.8 PG (ref 26–34)
MCHC RBC AUTO-ENTMCNC: 32.6 G/DL (ref 31–36)
MCV RBC AUTO: 88.4 FL (ref 80–100)
PHOSPHATE SERPL-MCNC: 2.8 MG/DL (ref 2.5–4.9)
PLATELET # BLD AUTO: 296 K/UL (ref 135–450)
PMV BLD AUTO: 6.8 FL (ref 5–10.5)
POTASSIUM SERPL-SCNC: 4.4 MMOL/L (ref 3.5–5.1)
RBC # BLD AUTO: 4.37 M/UL (ref 4–5.2)
SODIUM SERPL-SCNC: 137 MMOL/L (ref 136–145)
WBC # BLD AUTO: 11.5 K/UL (ref 4–11)

## 2024-05-09 PROCEDURE — 94640 AIRWAY INHALATION TREATMENT: CPT

## 2024-05-09 PROCEDURE — 6360000002 HC RX W HCPCS: Performed by: INTERNAL MEDICINE

## 2024-05-09 PROCEDURE — 6370000000 HC RX 637 (ALT 250 FOR IP): Performed by: STUDENT IN AN ORGANIZED HEALTH CARE EDUCATION/TRAINING PROGRAM

## 2024-05-09 PROCEDURE — 6370000000 HC RX 637 (ALT 250 FOR IP): Performed by: INTERNAL MEDICINE

## 2024-05-09 PROCEDURE — 80069 RENAL FUNCTION PANEL: CPT

## 2024-05-09 PROCEDURE — 2700000000 HC OXYGEN THERAPY PER DAY

## 2024-05-09 PROCEDURE — 1200000000 HC SEMI PRIVATE

## 2024-05-09 PROCEDURE — 2580000003 HC RX 258: Performed by: INTERNAL MEDICINE

## 2024-05-09 PROCEDURE — 94669 MECHANICAL CHEST WALL OSCILL: CPT

## 2024-05-09 PROCEDURE — 94761 N-INVAS EAR/PLS OXIMETRY MLT: CPT

## 2024-05-09 PROCEDURE — 83735 ASSAY OF MAGNESIUM: CPT

## 2024-05-09 PROCEDURE — 85027 COMPLETE CBC AUTOMATED: CPT

## 2024-05-09 RX ORDER — GUAIFENESIN/DEXTROMETHORPHAN 100-10MG/5
5 SYRUP ORAL EVERY 4 HOURS PRN
Status: DISCONTINUED | OUTPATIENT
Start: 2024-05-09 | End: 2024-05-10

## 2024-05-09 RX ADMIN — HYDROCODONE BITARTRATE AND HOMATROPINE METHYLBROMIDE 5 ML: 5; 1.5 SOLUTION ORAL at 05:08

## 2024-05-09 RX ADMIN — AZITHROMYCIN 250 MG: 250 TABLET, FILM COATED ORAL at 10:18

## 2024-05-09 RX ADMIN — TIOTROPIUM BROMIDE INHALATION SPRAY 2 PUFF: 3.12 SPRAY, METERED RESPIRATORY (INHALATION) at 08:02

## 2024-05-09 RX ADMIN — GUAIFENESIN 600 MG: 600 TABLET ORAL at 10:19

## 2024-05-09 RX ADMIN — GABAPENTIN 600 MG: 300 CAPSULE ORAL at 10:19

## 2024-05-09 RX ADMIN — GABAPENTIN 600 MG: 300 CAPSULE ORAL at 15:42

## 2024-05-09 RX ADMIN — Medication 10 ML: at 10:20

## 2024-05-09 RX ADMIN — MONTELUKAST SODIUM 10 MG: 10 TABLET, COATED ORAL at 19:35

## 2024-05-09 RX ADMIN — CEFTRIAXONE SODIUM 1000 MG: 1 INJECTION, POWDER, FOR SOLUTION INTRAMUSCULAR; INTRAVENOUS at 10:22

## 2024-05-09 RX ADMIN — SODIUM CHLORIDE 25 ML: 9 INJECTION, SOLUTION INTRAVENOUS at 10:22

## 2024-05-09 RX ADMIN — ENOXAPARIN SODIUM 40 MG: 100 INJECTION SUBCUTANEOUS at 10:26

## 2024-05-09 RX ADMIN — GABAPENTIN 600 MG: 300 CAPSULE ORAL at 19:35

## 2024-05-09 RX ADMIN — WATER 40 MG: 1 INJECTION INTRAMUSCULAR; INTRAVENOUS; SUBCUTANEOUS at 10:26

## 2024-05-09 RX ADMIN — Medication 2 PUFF: at 08:01

## 2024-05-09 RX ADMIN — Medication 2 PUFF: at 20:01

## 2024-05-09 RX ADMIN — PANTOPRAZOLE SODIUM 40 MG: 40 TABLET, DELAYED RELEASE ORAL at 10:19

## 2024-05-09 RX ADMIN — Medication 10 ML: at 19:35

## 2024-05-09 RX ADMIN — ACETAMINOPHEN 650 MG: 325 TABLET ORAL at 11:55

## 2024-05-09 RX ADMIN — IPRATROPIUM BROMIDE AND ALBUTEROL SULFATE 1 DOSE: .5; 2.5 SOLUTION RESPIRATORY (INHALATION) at 08:05

## 2024-05-09 RX ADMIN — WATER 40 MG: 1 INJECTION INTRAMUSCULAR; INTRAVENOUS; SUBCUTANEOUS at 19:35

## 2024-05-09 RX ADMIN — ATORVASTATIN CALCIUM 20 MG: 10 TABLET, FILM COATED ORAL at 10:19

## 2024-05-09 ASSESSMENT — PAIN DESCRIPTION - LOCATION
LOCATION: ABDOMEN;BACK
LOCATION: ABDOMEN;BACK
LOCATION: BACK;ABDOMEN

## 2024-05-09 ASSESSMENT — PAIN DESCRIPTION - ORIENTATION
ORIENTATION: MID
ORIENTATION: MID

## 2024-05-09 ASSESSMENT — PAIN SCALES - GENERAL
PAINLEVEL_OUTOF10: 7
PAINLEVEL_OUTOF10: 6
PAINLEVEL_OUTOF10: 0
PAINLEVEL_OUTOF10: 4

## 2024-05-09 ASSESSMENT — PAIN DESCRIPTION - DESCRIPTORS
DESCRIPTORS: ACHING
DESCRIPTORS: ACHING;PRESSURE

## 2024-05-09 ASSESSMENT — PAIN DESCRIPTION - PAIN TYPE: TYPE: ACUTE PAIN

## 2024-05-09 NOTE — FLOWSHEET NOTE
05/09/24 0947   Assessment   Charting Type Shift assessment   Psychosocial   Psychosocial (WDL) WDL   Neurological   Neuro (WDL) X   Level of Consciousness 0   Orientation Level Oriented X4;Oriented to person;Oriented to place;Oriented to time;Oriented to situation   Cognition Appropriate judgement;Appropriate safety awareness;Appropriate attention/concentration;Appropriate for developmental age;Follows commands   Speech Clear   R Hand  Moderate   L Hand  Moderate   R Foot Dorsiflexion Moderate   L Foot Dorsiflexion Moderate   R Foot Plantar Flexion Moderate   L Foot Plantar Flexion Moderate   RUE Motor Response Responds to command;Normal extension;Normal flexion   RUE Sensation  Full sensation   LUE Motor Response Normal extension;Normal flexion;Responds to command   LUE Sensation  Full sensation   RLE Motor Response Normal extension;Normal flexion;Responds to command   RLE Sensation  Full sensation   LLE Motor Response Normal extension;Normal flexion;Responds to command   LLE Sensation  Full sensation   Gag Present   Cough Reflex Present   [place] Coma Scale   Eye Opening 4   Best Verbal Response 5   Best Motor Response 6   [place] Coma Scale Score 15   NIHSS Stroke Scale   NIHSS Stroke Scale Assessed No   HEENT (Head, Ears, Eyes, Nose, & Throat)   HEENT (WDL) X   Right Eye Impaired vision;Glasses   Left Eye Impaired vision;Glasses   Teeth Dentures upper;Missing teeth;Dentures lower;Other (comment)  (only has upper dentures with her)   Respiratory   Respiratory (WDL) X   Respiratory Interventions H.O.B. elevated;Cough & deep breathe   Respiratory Pattern Regular   Respiratory Depth Deep   Respiratory Quality/Effort Dyspnea with exertion;Dyspnea at rest   Chest Assessment Trachea midline;Chest expansion symmetrical   L Breath Sounds Diminished;Expiratory Wheezes;Crackles   R Breath Sounds Diminished;Expiratory Wheezes   Level of Activity/Mobility 1   Cough/Sputum   Cough Congested;Moist;Productive

## 2024-05-09 NOTE — ACP (ADVANCE CARE PLANNING)
Advance Care Planning     Advance Care Planning Inpatient Note  [facility] Department    Today's Date: 5/9/2024  Unit: [facility] C5 - MED SURG/ORTHO    Received request from IDT Member.  Upon review of chart and communication with care team, patient's decision making abilities are not in question.. Patient was present in the room during visit.    Goals of ACP Conversation:  Discuss advance care planning documents  Facilitate a discussion related to patient's goals of care as they align with the patient's values and beliefs.    Health Care Decision Makers:      Primary Decision Maker: Caro Arriola - Child - [phone]    Secondary Decision Maker: Jean PaulMagdaDarshan - Child - [phone]  Summary:  Verified Healthcare Decision Maker    Advance Care Planning Documents (Patient Wishes):  None     Electronically signed by Chaplain Yusef on 5/9/2024 at 12:17 PM          Today I spoke with Valery, informing her that if she wanted to establish an official hierarchy for decision making for healthcare she would need to complete HCPOA documents.  She said she would think about it and reach out to [facility] if she thought that was necessary.      Thank you for consulting [facility]    If you would like a 's presence for emotional, spiritual, grief or comfort care,   please dial \"[phone]\" and ask for the  on-call to be paged.    For help with Advanced Care Planning, Power of  for Healthcare or Living Will forms, you may also call us directly:    [phone]) Nura  [phone]) [place]  [phone]) Outpatient    [facility]

## 2024-05-09 NOTE — CARE COORDINATION
Case Management Assessment  Initial Evaluation    Date/Time of Evaluation: 5/9/2024 3:16 PM  Assessment Completed by: KAITLIN MARIE RN    If patient is discharged prior to next notation, then this note serves as note for discharge by case management.    Patient Name: Valery Salcido                   YOB: 1955  Diagnosis: COPD (chronic obstructive pulmonary disease) (HCC) [J44.9]  Acute respiratory distress [R06.03]  Lactic acidosis [E87.20]  Metabolic acidosis [E87.20]  Atypical pneumonia [J18.9]  COPD exacerbation (HCC) [J44.1]                   Date / Time: 5/8/2024 10:45 AM    Patient Admission Status: Inpatient   Readmission Risk (Low < 19, Mod (19-27), High > 27): Readmission Risk Score: 8.7    Current PCP: Vivian Jay MD  PCP verified by CM? Yes    Chart Reviewed: Yes      History Provided by: Patient  Patient Orientation: Alert and Oriented    Patient Cognition: Alert    Hospitalization in the last 30 days (Readmission):  No    If yes, Readmission Assessment in  Navigator will be completed.    Advance Directives:      Code Status: Full Code   Patient's Primary Decision Maker is: Legal Next of Kin    Primary Decision Maker: Caro Arriola - Child - [phone]    Secondary Decision Maker: Jean PaulDarshan - Child - [phone]    Discharge Planning:    Patient lives with: Friends Type of Home: House  Primary Care Giver: Self  Patient Support Systems include: Children, Family Members, Friends/Neighbors   Current Financial resources: Medicare  Current community resources: Other (Comment) (private duty)  Current services prior to admission: Private Duty Homecare            Current DME:              Type of Home Care services:  OT, PT, Nursing Services    ADLS  Prior functional level: Assistance with the following:, Housework  Current functional level: Assistance with the following:, Bathing, Dressing, Toileting, Cooking, Housework, Shopping, Mobility    PT AM-PAC:   /24  OT AM-PAC:

## 2024-05-10 PROCEDURE — 6370000000 HC RX 637 (ALT 250 FOR IP): Performed by: STUDENT IN AN ORGANIZED HEALTH CARE EDUCATION/TRAINING PROGRAM

## 2024-05-10 PROCEDURE — 94640 AIRWAY INHALATION TREATMENT: CPT

## 2024-05-10 PROCEDURE — 2580000003 HC RX 258: Performed by: INTERNAL MEDICINE

## 2024-05-10 PROCEDURE — 94761 N-INVAS EAR/PLS OXIMETRY MLT: CPT

## 2024-05-10 PROCEDURE — 6370000000 HC RX 637 (ALT 250 FOR IP): Performed by: INTERNAL MEDICINE

## 2024-05-10 PROCEDURE — 2700000000 HC OXYGEN THERAPY PER DAY

## 2024-05-10 PROCEDURE — 1200000000 HC SEMI PRIVATE

## 2024-05-10 PROCEDURE — 6360000002 HC RX W HCPCS: Performed by: INTERNAL MEDICINE

## 2024-05-10 PROCEDURE — 94669 MECHANICAL CHEST WALL OSCILL: CPT

## 2024-05-10 RX ORDER — GUAIFENESIN/DEXTROMETHORPHAN 100-10MG/5
10 SYRUP ORAL EVERY 4 HOURS PRN
Status: DISCONTINUED | OUTPATIENT
Start: 2024-05-10 | End: 2024-05-12

## 2024-05-10 RX ORDER — LIDOCAINE 4 G/G
1 PATCH TOPICAL DAILY
Status: DISCONTINUED | OUTPATIENT
Start: 2024-05-10 | End: 2024-05-21 | Stop reason: HOSPADM

## 2024-05-10 RX ADMIN — GUAIFENESIN AND DEXTROMETHORPHAN 10 ML: 100; 10 SYRUP ORAL at 13:21

## 2024-05-10 RX ADMIN — CEFTRIAXONE SODIUM 1000 MG: 1 INJECTION, POWDER, FOR SOLUTION INTRAMUSCULAR; INTRAVENOUS at 09:33

## 2024-05-10 RX ADMIN — WATER 40 MG: 1 INJECTION INTRAMUSCULAR; INTRAVENOUS; SUBCUTANEOUS at 19:36

## 2024-05-10 RX ADMIN — GABAPENTIN 600 MG: 300 CAPSULE ORAL at 19:36

## 2024-05-10 RX ADMIN — TIOTROPIUM BROMIDE INHALATION SPRAY 2 PUFF: 3.12 SPRAY, METERED RESPIRATORY (INHALATION) at 08:31

## 2024-05-10 RX ADMIN — IPRATROPIUM BROMIDE AND ALBUTEROL SULFATE 1 DOSE: .5; 2.5 SOLUTION RESPIRATORY (INHALATION) at 20:42

## 2024-05-10 RX ADMIN — ACETAMINOPHEN 650 MG: 325 TABLET ORAL at 15:26

## 2024-05-10 RX ADMIN — Medication 2 PUFF: at 20:42

## 2024-05-10 RX ADMIN — WATER 40 MG: 1 INJECTION INTRAMUSCULAR; INTRAVENOUS; SUBCUTANEOUS at 09:26

## 2024-05-10 RX ADMIN — GUAIFENESIN AND DEXTROMETHORPHAN 10 ML: 100; 10 SYRUP ORAL at 19:36

## 2024-05-10 RX ADMIN — ENOXAPARIN SODIUM 40 MG: 100 INJECTION SUBCUTANEOUS at 09:26

## 2024-05-10 RX ADMIN — MONTELUKAST SODIUM 10 MG: 10 TABLET, COATED ORAL at 19:36

## 2024-05-10 RX ADMIN — ACETAMINOPHEN 650 MG: 325 TABLET ORAL at 02:32

## 2024-05-10 RX ADMIN — TIZANIDINE 4 MG: 4 TABLET ORAL at 13:21

## 2024-05-10 RX ADMIN — HYDROCODONE BITARTRATE AND HOMATROPINE METHYLBROMIDE 5 ML: 5; 1.5 SOLUTION ORAL at 09:42

## 2024-05-10 RX ADMIN — ATORVASTATIN CALCIUM 20 MG: 10 TABLET, FILM COATED ORAL at 09:25

## 2024-05-10 RX ADMIN — Medication 10 ML: at 09:27

## 2024-05-10 RX ADMIN — AZITHROMYCIN 250 MG: 250 TABLET, FILM COATED ORAL at 09:26

## 2024-05-10 RX ADMIN — PANTOPRAZOLE SODIUM 40 MG: 40 TABLET, DELAYED RELEASE ORAL at 09:26

## 2024-05-10 RX ADMIN — GABAPENTIN 600 MG: 300 CAPSULE ORAL at 09:26

## 2024-05-10 RX ADMIN — Medication 2 PUFF: at 08:31

## 2024-05-10 RX ADMIN — Medication 10 ML: at 19:39

## 2024-05-10 RX ADMIN — GABAPENTIN 600 MG: 300 CAPSULE ORAL at 13:11

## 2024-05-10 ASSESSMENT — PAIN SCALES - GENERAL
PAINLEVEL_OUTOF10: 3
PAINLEVEL_OUTOF10: 5
PAINLEVEL_OUTOF10: 0
PAINLEVEL_OUTOF10: 6
PAINLEVEL_OUTOF10: 4
PAINLEVEL_OUTOF10: 6
PAINLEVEL_OUTOF10: 4
PAINLEVEL_OUTOF10: 2

## 2024-05-10 ASSESSMENT — PAIN DESCRIPTION - ORIENTATION
ORIENTATION: MID
ORIENTATION: RIGHT
ORIENTATION: MID;LOWER

## 2024-05-10 ASSESSMENT — PAIN DESCRIPTION - DESCRIPTORS
DESCRIPTORS: ACHING;DISCOMFORT
DESCRIPTORS: ACHING
DESCRIPTORS: ACHING

## 2024-05-10 ASSESSMENT — PAIN DESCRIPTION - PAIN TYPE: TYPE: ACUTE PAIN

## 2024-05-10 ASSESSMENT — PAIN SCALES - WONG BAKER
WONGBAKER_NUMERICALRESPONSE: NO HURT

## 2024-05-10 ASSESSMENT — PAIN DESCRIPTION - LOCATION
LOCATION: CHEST
LOCATION: BACK;RIB CAGE

## 2024-05-10 ASSESSMENT — PAIN DESCRIPTION - ONSET
ONSET: ON-GOING
ONSET: ON-GOING

## 2024-05-10 ASSESSMENT — PAIN DESCRIPTION - FREQUENCY
FREQUENCY: CONTINUOUS
FREQUENCY: CONTINUOUS

## 2024-05-10 NOTE — CARE COORDINATION
Chart reviewed day 2. Pt is followed by IM. Currently on 2L, room are baseline. Admin of IV abx. Anticipate d/c Monday? [facility] following for SN at d/c.  Pt from home w/care taker and mother. Will follow for needs as they arise. Electronically signed by KAITLIN MARIE RN on 5/10/2024 at 2:48 PM

## 2024-05-11 ENCOUNTER — APPOINTMENT (OUTPATIENT)
Dept: GENERAL RADIOLOGY | Age: 69
DRG: 193 | End: 2024-05-11
Payer: MEDICARE

## 2024-05-11 LAB
ANION GAP SERPL CALCULATED.3IONS-SCNC: 13 MMOL/L (ref 3–16)
BUN SERPL-MCNC: 25 MG/DL (ref 7–20)
CALCIUM SERPL-MCNC: 10.1 MG/DL (ref 8.3–10.6)
CHLORIDE SERPL-SCNC: 101 MMOL/L (ref 99–110)
CO2 SERPL-SCNC: 24 MMOL/L (ref 21–32)
CREAT SERPL-MCNC: 0.7 MG/DL (ref 0.6–1.2)
DEPRECATED RDW RBC AUTO: 15 % (ref 12.4–15.4)
GFR SERPLBLD CREATININE-BSD FMLA CKD-EPI: >90 ML/MIN/{1.73_M2}
GLUCOSE SERPL-MCNC: 150 MG/DL (ref 70–99)
HCT VFR BLD AUTO: 48.5 % (ref 36–48)
HGB BLD-MCNC: 15.9 G/DL (ref 12–16)
MAGNESIUM SERPL-MCNC: 2.1 MG/DL (ref 1.8–2.4)
MCH RBC QN AUTO: 28.7 PG (ref 26–34)
MCHC RBC AUTO-ENTMCNC: 32.7 G/DL (ref 31–36)
MCV RBC AUTO: 87.8 FL (ref 80–100)
PLATELET # BLD AUTO: 411 K/UL (ref 135–450)
PMV BLD AUTO: 6.9 FL (ref 5–10.5)
POTASSIUM SERPL-SCNC: 4.7 MMOL/L (ref 3.5–5.1)
RBC # BLD AUTO: 5.53 M/UL (ref 4–5.2)
SODIUM SERPL-SCNC: 138 MMOL/L (ref 136–145)
WBC # BLD AUTO: 17.9 K/UL (ref 4–11)

## 2024-05-11 PROCEDURE — 2700000000 HC OXYGEN THERAPY PER DAY

## 2024-05-11 PROCEDURE — 6370000000 HC RX 637 (ALT 250 FOR IP): Performed by: STUDENT IN AN ORGANIZED HEALTH CARE EDUCATION/TRAINING PROGRAM

## 2024-05-11 PROCEDURE — 6370000000 HC RX 637 (ALT 250 FOR IP): Performed by: INTERNAL MEDICINE

## 2024-05-11 PROCEDURE — 71045 X-RAY EXAM CHEST 1 VIEW: CPT

## 2024-05-11 PROCEDURE — 6360000002 HC RX W HCPCS: Performed by: INTERNAL MEDICINE

## 2024-05-11 PROCEDURE — 1200000000 HC SEMI PRIVATE

## 2024-05-11 PROCEDURE — 85027 COMPLETE CBC AUTOMATED: CPT

## 2024-05-11 PROCEDURE — 94761 N-INVAS EAR/PLS OXIMETRY MLT: CPT

## 2024-05-11 PROCEDURE — 80048 BASIC METABOLIC PNL TOTAL CA: CPT

## 2024-05-11 PROCEDURE — 83735 ASSAY OF MAGNESIUM: CPT

## 2024-05-11 PROCEDURE — 2580000003 HC RX 258: Performed by: INTERNAL MEDICINE

## 2024-05-11 PROCEDURE — 94669 MECHANICAL CHEST WALL OSCILL: CPT

## 2024-05-11 PROCEDURE — 94640 AIRWAY INHALATION TREATMENT: CPT

## 2024-05-11 RX ORDER — LORAZEPAM 0.5 MG/1
0.5 TABLET ORAL EVERY 6 HOURS PRN
Status: DISCONTINUED | OUTPATIENT
Start: 2024-05-11 | End: 2024-05-13 | Stop reason: SDUPTHER

## 2024-05-11 RX ORDER — IPRATROPIUM BROMIDE AND ALBUTEROL SULFATE 2.5; .5 MG/3ML; MG/3ML
1 SOLUTION RESPIRATORY (INHALATION)
Status: DISCONTINUED | OUTPATIENT
Start: 2024-05-11 | End: 2024-05-12

## 2024-05-11 RX ORDER — SODIUM CHLORIDE FOR INHALATION 3 %
4 VIAL, NEBULIZER (ML) INHALATION PRN
Status: DISCONTINUED | OUTPATIENT
Start: 2024-05-11 | End: 2024-05-21 | Stop reason: HOSPADM

## 2024-05-11 RX ADMIN — Medication 10 ML: at 08:17

## 2024-05-11 RX ADMIN — AZITHROMYCIN 250 MG: 250 TABLET, FILM COATED ORAL at 08:16

## 2024-05-11 RX ADMIN — SODIUM CHLORIDE: 9 INJECTION, SOLUTION INTRAVENOUS at 08:16

## 2024-05-11 RX ADMIN — GUAIFENESIN AND DEXTROMETHORPHAN 10 ML: 100; 10 SYRUP ORAL at 16:12

## 2024-05-11 RX ADMIN — ACETAMINOPHEN 650 MG: 325 TABLET ORAL at 23:26

## 2024-05-11 RX ADMIN — LORAZEPAM 0.5 MG: 0.5 TABLET ORAL at 10:38

## 2024-05-11 RX ADMIN — GABAPENTIN 600 MG: 300 CAPSULE ORAL at 08:16

## 2024-05-11 RX ADMIN — Medication 10 ML: at 20:33

## 2024-05-11 RX ADMIN — WATER 40 MG: 1 INJECTION INTRAMUSCULAR; INTRAVENOUS; SUBCUTANEOUS at 20:33

## 2024-05-11 RX ADMIN — PANTOPRAZOLE SODIUM 40 MG: 40 TABLET, DELAYED RELEASE ORAL at 08:16

## 2024-05-11 RX ADMIN — IPRATROPIUM BROMIDE AND ALBUTEROL SULFATE 1 DOSE: .5; 2.5 SOLUTION RESPIRATORY (INHALATION) at 19:49

## 2024-05-11 RX ADMIN — ATORVASTATIN CALCIUM 20 MG: 10 TABLET, FILM COATED ORAL at 08:16

## 2024-05-11 RX ADMIN — WATER 40 MG: 1 INJECTION INTRAMUSCULAR; INTRAVENOUS; SUBCUTANEOUS at 08:16

## 2024-05-11 RX ADMIN — GUAIFENESIN AND DEXTROMETHORPHAN 10 ML: 100; 10 SYRUP ORAL at 12:08

## 2024-05-11 RX ADMIN — CEFTRIAXONE SODIUM 1000 MG: 1 INJECTION, POWDER, FOR SOLUTION INTRAMUSCULAR; INTRAVENOUS at 08:16

## 2024-05-11 RX ADMIN — GABAPENTIN 600 MG: 300 CAPSULE ORAL at 14:14

## 2024-05-11 RX ADMIN — ENOXAPARIN SODIUM 40 MG: 100 INJECTION SUBCUTANEOUS at 08:17

## 2024-05-11 RX ADMIN — IPRATROPIUM BROMIDE AND ALBUTEROL SULFATE 1 DOSE: .5; 2.5 SOLUTION RESPIRATORY (INHALATION) at 11:33

## 2024-05-11 RX ADMIN — Medication 2 PUFF: at 07:36

## 2024-05-11 RX ADMIN — TIOTROPIUM BROMIDE INHALATION SPRAY 2 PUFF: 3.12 SPRAY, METERED RESPIRATORY (INHALATION) at 07:36

## 2024-05-11 RX ADMIN — Medication 10 ML: at 20:07

## 2024-05-11 RX ADMIN — ACETAMINOPHEN 650 MG: 325 TABLET ORAL at 05:20

## 2024-05-11 RX ADMIN — GABAPENTIN 600 MG: 300 CAPSULE ORAL at 20:07

## 2024-05-11 RX ADMIN — MONTELUKAST SODIUM 10 MG: 10 TABLET, COATED ORAL at 20:07

## 2024-05-11 RX ADMIN — IPRATROPIUM BROMIDE AND ALBUTEROL SULFATE 1 DOSE: .5; 2.5 SOLUTION RESPIRATORY (INHALATION) at 15:14

## 2024-05-11 RX ADMIN — GUAIFENESIN AND DEXTROMETHORPHAN 10 ML: 100; 10 SYRUP ORAL at 20:36

## 2024-05-11 RX ADMIN — ACETAMINOPHEN 650 MG: 325 TABLET ORAL at 12:08

## 2024-05-11 RX ADMIN — IPRATROPIUM BROMIDE AND ALBUTEROL SULFATE 1 DOSE: .5; 2.5 SOLUTION RESPIRATORY (INHALATION) at 07:36

## 2024-05-11 RX ADMIN — Medication 2 PUFF: at 19:49

## 2024-05-11 RX ADMIN — GUAIFENESIN AND DEXTROMETHORPHAN 10 ML: 100; 10 SYRUP ORAL at 05:20

## 2024-05-11 ASSESSMENT — PAIN SCALES - WONG BAKER: WONGBAKER_NUMERICALRESPONSE: HURTS LITTLE MORE

## 2024-05-11 ASSESSMENT — PAIN DESCRIPTION - ONSET: ONSET: SUDDEN

## 2024-05-11 ASSESSMENT — PAIN DESCRIPTION - DESCRIPTORS
DESCRIPTORS: ACHING
DESCRIPTORS: ACHING

## 2024-05-11 ASSESSMENT — PAIN DESCRIPTION - ORIENTATION
ORIENTATION: MID
ORIENTATION: MID;LOWER

## 2024-05-11 ASSESSMENT — PAIN - FUNCTIONAL ASSESSMENT: PAIN_FUNCTIONAL_ASSESSMENT: PREVENTS OR INTERFERES SOME ACTIVE ACTIVITIES AND ADLS

## 2024-05-11 ASSESSMENT — PAIN DESCRIPTION - LOCATION
LOCATION: BACK;RIB CAGE
LOCATION: ABDOMEN

## 2024-05-11 ASSESSMENT — PAIN SCALES - GENERAL
PAINLEVEL_OUTOF10: 4
PAINLEVEL_OUTOF10: 5

## 2024-05-11 ASSESSMENT — PAIN DESCRIPTION - FREQUENCY: FREQUENCY: INTERMITTENT

## 2024-05-11 ASSESSMENT — PAIN DESCRIPTION - PAIN TYPE: TYPE: ACUTE PAIN

## 2024-05-12 LAB
ANION GAP SERPL CALCULATED.3IONS-SCNC: 10 MMOL/L (ref 3–16)
BACTERIA BLD CULT ORG #2: NORMAL
BACTERIA BLD CULT: NORMAL
BUN SERPL-MCNC: 26 MG/DL (ref 7–20)
CALCIUM SERPL-MCNC: 9.5 MG/DL (ref 8.3–10.6)
CHLORIDE SERPL-SCNC: 99 MMOL/L (ref 99–110)
CO2 SERPL-SCNC: 26 MMOL/L (ref 21–32)
CREAT SERPL-MCNC: 0.6 MG/DL (ref 0.6–1.2)
DEPRECATED RDW RBC AUTO: 15 % (ref 12.4–15.4)
GFR SERPLBLD CREATININE-BSD FMLA CKD-EPI: >90 ML/MIN/{1.73_M2}
GLUCOSE SERPL-MCNC: 146 MG/DL (ref 70–99)
HCT VFR BLD AUTO: 43.4 % (ref 36–48)
HGB BLD-MCNC: 14.5 G/DL (ref 12–16)
MAGNESIUM SERPL-MCNC: 2.2 MG/DL (ref 1.8–2.4)
MCH RBC QN AUTO: 29.2 PG (ref 26–34)
MCHC RBC AUTO-ENTMCNC: 33.3 G/DL (ref 31–36)
MCV RBC AUTO: 87.6 FL (ref 80–100)
PLATELET # BLD AUTO: 365 K/UL (ref 135–450)
PMV BLD AUTO: 7 FL (ref 5–10.5)
POTASSIUM SERPL-SCNC: 4.8 MMOL/L (ref 3.5–5.1)
RBC # BLD AUTO: 4.95 M/UL (ref 4–5.2)
SODIUM SERPL-SCNC: 135 MMOL/L (ref 136–145)
WBC # BLD AUTO: 17.9 K/UL (ref 4–11)

## 2024-05-12 PROCEDURE — 80048 BASIC METABOLIC PNL TOTAL CA: CPT

## 2024-05-12 PROCEDURE — 6370000000 HC RX 637 (ALT 250 FOR IP): Performed by: STUDENT IN AN ORGANIZED HEALTH CARE EDUCATION/TRAINING PROGRAM

## 2024-05-12 PROCEDURE — 6370000000 HC RX 637 (ALT 250 FOR IP): Performed by: INTERNAL MEDICINE

## 2024-05-12 PROCEDURE — 6360000002 HC RX W HCPCS: Performed by: INTERNAL MEDICINE

## 2024-05-12 PROCEDURE — 6370000000 HC RX 637 (ALT 250 FOR IP): Performed by: NURSE PRACTITIONER

## 2024-05-12 PROCEDURE — 94761 N-INVAS EAR/PLS OXIMETRY MLT: CPT

## 2024-05-12 PROCEDURE — 1200000000 HC SEMI PRIVATE

## 2024-05-12 PROCEDURE — 94669 MECHANICAL CHEST WALL OSCILL: CPT

## 2024-05-12 PROCEDURE — 94640 AIRWAY INHALATION TREATMENT: CPT

## 2024-05-12 PROCEDURE — 83735 ASSAY OF MAGNESIUM: CPT

## 2024-05-12 PROCEDURE — 2700000000 HC OXYGEN THERAPY PER DAY

## 2024-05-12 PROCEDURE — 2580000003 HC RX 258: Performed by: INTERNAL MEDICINE

## 2024-05-12 PROCEDURE — 85027 COMPLETE CBC AUTOMATED: CPT

## 2024-05-12 RX ORDER — BENZONATATE 100 MG/1
200 CAPSULE ORAL 3 TIMES DAILY PRN
Status: DISCONTINUED | OUTPATIENT
Start: 2024-05-12 | End: 2024-05-21 | Stop reason: HOSPADM

## 2024-05-12 RX ORDER — GUAIFENESIN/DEXTROMETHORPHAN 100-10MG/5
10 SYRUP ORAL EVERY 4 HOURS PRN
Status: DISCONTINUED | OUTPATIENT
Start: 2024-05-12 | End: 2024-05-21 | Stop reason: HOSPADM

## 2024-05-12 RX ORDER — GUAIFENESIN 600 MG/1
600 TABLET, EXTENDED RELEASE ORAL 2 TIMES DAILY
Status: DISCONTINUED | OUTPATIENT
Start: 2024-05-12 | End: 2024-05-21 | Stop reason: HOSPADM

## 2024-05-12 RX ORDER — IPRATROPIUM BROMIDE AND ALBUTEROL SULFATE 2.5; .5 MG/3ML; MG/3ML
1 SOLUTION RESPIRATORY (INHALATION)
Status: DISCONTINUED | OUTPATIENT
Start: 2024-05-12 | End: 2024-05-14

## 2024-05-12 RX ORDER — AZITHROMYCIN 250 MG/1
250 TABLET, FILM COATED ORAL DAILY
Status: COMPLETED | OUTPATIENT
Start: 2024-05-13 | End: 2024-05-13

## 2024-05-12 RX ADMIN — GUAIFENESIN 600 MG: 600 TABLET ORAL at 07:33

## 2024-05-12 RX ADMIN — PANTOPRAZOLE SODIUM 40 MG: 40 TABLET, DELAYED RELEASE ORAL at 07:34

## 2024-05-12 RX ADMIN — GABAPENTIN 600 MG: 300 CAPSULE ORAL at 19:27

## 2024-05-12 RX ADMIN — LORAZEPAM 0.5 MG: 0.5 TABLET ORAL at 07:38

## 2024-05-12 RX ADMIN — Medication 2 PUFF: at 07:47

## 2024-05-12 RX ADMIN — Medication 10 ML: at 20:41

## 2024-05-12 RX ADMIN — Medication 10 ML: at 07:35

## 2024-05-12 RX ADMIN — GABAPENTIN 600 MG: 300 CAPSULE ORAL at 07:34

## 2024-05-12 RX ADMIN — BENZONATATE 200 MG: 100 CAPSULE ORAL at 07:38

## 2024-05-12 RX ADMIN — GUAIFENESIN 600 MG: 600 TABLET ORAL at 19:27

## 2024-05-12 RX ADMIN — IPRATROPIUM BROMIDE AND ALBUTEROL SULFATE 1 DOSE: .5; 2.5 SOLUTION RESPIRATORY (INHALATION) at 19:36

## 2024-05-12 RX ADMIN — Medication 2 PUFF: at 19:36

## 2024-05-12 RX ADMIN — WATER 40 MG: 1 INJECTION INTRAMUSCULAR; INTRAVENOUS; SUBCUTANEOUS at 07:34

## 2024-05-12 RX ADMIN — MONTELUKAST SODIUM 10 MG: 10 TABLET, COATED ORAL at 19:27

## 2024-05-12 RX ADMIN — IPRATROPIUM BROMIDE AND ALBUTEROL SULFATE 1 DOSE: .5; 2.5 SOLUTION RESPIRATORY (INHALATION) at 07:46

## 2024-05-12 RX ADMIN — WATER 40 MG: 1 INJECTION INTRAMUSCULAR; INTRAVENOUS; SUBCUTANEOUS at 20:40

## 2024-05-12 RX ADMIN — CEFTRIAXONE SODIUM 1000 MG: 1 INJECTION, POWDER, FOR SOLUTION INTRAMUSCULAR; INTRAVENOUS at 07:38

## 2024-05-12 RX ADMIN — ENOXAPARIN SODIUM 40 MG: 100 INJECTION SUBCUTANEOUS at 07:33

## 2024-05-12 RX ADMIN — ATORVASTATIN CALCIUM 20 MG: 10 TABLET, FILM COATED ORAL at 07:34

## 2024-05-12 RX ADMIN — ACETAMINOPHEN 650 MG: 325 TABLET ORAL at 19:27

## 2024-05-12 RX ADMIN — GABAPENTIN 600 MG: 300 CAPSULE ORAL at 14:18

## 2024-05-12 RX ADMIN — HYDROXYZINE HYDROCHLORIDE 50 MG: 25 TABLET, FILM COATED ORAL at 10:40

## 2024-05-12 RX ADMIN — GUAIFENESIN AND DEXTROMETHORPHAN 10 ML: 100; 10 SYRUP ORAL at 10:44

## 2024-05-12 RX ADMIN — ACETAMINOPHEN 650 MG: 325 TABLET ORAL at 14:20

## 2024-05-12 RX ADMIN — BENZONATATE 200 MG: 100 CAPSULE ORAL at 20:40

## 2024-05-12 RX ADMIN — IPRATROPIUM BROMIDE AND ALBUTEROL SULFATE 1 DOSE: .5; 2.5 SOLUTION RESPIRATORY (INHALATION) at 15:47

## 2024-05-12 RX ADMIN — AZITHROMYCIN 250 MG: 250 TABLET, FILM COATED ORAL at 07:33

## 2024-05-12 RX ADMIN — IPRATROPIUM BROMIDE AND ALBUTEROL SULFATE 1 DOSE: .5; 2.5 SOLUTION RESPIRATORY (INHALATION) at 11:14

## 2024-05-12 RX ADMIN — SODIUM CHLORIDE: 9 INJECTION, SOLUTION INTRAVENOUS at 07:37

## 2024-05-12 RX ADMIN — ONDANSETRON 4 MG: 4 TABLET, ORALLY DISINTEGRATING ORAL at 10:17

## 2024-05-12 ASSESSMENT — PAIN SCALES - GENERAL
PAINLEVEL_OUTOF10: 3
PAINLEVEL_OUTOF10: 2
PAINLEVEL_OUTOF10: 6

## 2024-05-12 ASSESSMENT — PAIN DESCRIPTION - FREQUENCY: FREQUENCY: INTERMITTENT

## 2024-05-12 ASSESSMENT — PAIN DESCRIPTION - ONSET: ONSET: GRADUAL

## 2024-05-12 ASSESSMENT — PAIN DESCRIPTION - DESCRIPTORS
DESCRIPTORS: ACHING
DESCRIPTORS: ACHING
DESCRIPTORS: ACHING;DISCOMFORT

## 2024-05-12 ASSESSMENT — PAIN - FUNCTIONAL ASSESSMENT
PAIN_FUNCTIONAL_ASSESSMENT: ACTIVITIES ARE NOT PREVENTED
PAIN_FUNCTIONAL_ASSESSMENT: PREVENTS OR INTERFERES SOME ACTIVE ACTIVITIES AND ADLS

## 2024-05-12 ASSESSMENT — PAIN DESCRIPTION - ORIENTATION
ORIENTATION: MID
ORIENTATION: MID
ORIENTATION: LOWER

## 2024-05-12 ASSESSMENT — PAIN DESCRIPTION - PAIN TYPE
TYPE: ACUTE PAIN
TYPE: ACUTE PAIN

## 2024-05-12 ASSESSMENT — PAIN SCALES - WONG BAKER
WONGBAKER_NUMERICALRESPONSE: HURTS EVEN MORE
WONGBAKER_NUMERICALRESPONSE: NO HURT

## 2024-05-12 ASSESSMENT — PAIN DESCRIPTION - LOCATION
LOCATION: BACK

## 2024-05-13 LAB
ANION GAP SERPL CALCULATED.3IONS-SCNC: 10 MMOL/L (ref 3–16)
BUN SERPL-MCNC: 23 MG/DL (ref 7–20)
CALCIUM SERPL-MCNC: 9.4 MG/DL (ref 8.3–10.6)
CHLORIDE SERPL-SCNC: 100 MMOL/L (ref 99–110)
CO2 SERPL-SCNC: 27 MMOL/L (ref 21–32)
CREAT SERPL-MCNC: 0.6 MG/DL (ref 0.6–1.2)
DEPRECATED RDW RBC AUTO: 15.3 % (ref 12.4–15.4)
GFR SERPLBLD CREATININE-BSD FMLA CKD-EPI: >90 ML/MIN/{1.73_M2}
GLUCOSE SERPL-MCNC: 140 MG/DL (ref 70–99)
HCT VFR BLD AUTO: 43.7 % (ref 36–48)
HGB BLD-MCNC: 14.4 G/DL (ref 12–16)
MAGNESIUM SERPL-MCNC: 2.2 MG/DL (ref 1.8–2.4)
MCH RBC QN AUTO: 29.1 PG (ref 26–34)
MCHC RBC AUTO-ENTMCNC: 32.9 G/DL (ref 31–36)
MCV RBC AUTO: 88.3 FL (ref 80–100)
PLATELET # BLD AUTO: 371 K/UL (ref 135–450)
PMV BLD AUTO: 6.9 FL (ref 5–10.5)
POTASSIUM SERPL-SCNC: 4.2 MMOL/L (ref 3.5–5.1)
RBC # BLD AUTO: 4.95 M/UL (ref 4–5.2)
SODIUM SERPL-SCNC: 137 MMOL/L (ref 136–145)
WBC # BLD AUTO: 17.2 K/UL (ref 4–11)

## 2024-05-13 PROCEDURE — 94761 N-INVAS EAR/PLS OXIMETRY MLT: CPT

## 2024-05-13 PROCEDURE — 6370000000 HC RX 637 (ALT 250 FOR IP): Performed by: STUDENT IN AN ORGANIZED HEALTH CARE EDUCATION/TRAINING PROGRAM

## 2024-05-13 PROCEDURE — 6360000002 HC RX W HCPCS: Performed by: INTERNAL MEDICINE

## 2024-05-13 PROCEDURE — 94640 AIRWAY INHALATION TREATMENT: CPT

## 2024-05-13 PROCEDURE — 83735 ASSAY OF MAGNESIUM: CPT

## 2024-05-13 PROCEDURE — 6360000002 HC RX W HCPCS: Performed by: STUDENT IN AN ORGANIZED HEALTH CARE EDUCATION/TRAINING PROGRAM

## 2024-05-13 PROCEDURE — 2700000000 HC OXYGEN THERAPY PER DAY

## 2024-05-13 PROCEDURE — 6370000000 HC RX 637 (ALT 250 FOR IP): Performed by: INTERNAL MEDICINE

## 2024-05-13 PROCEDURE — 1200000000 HC SEMI PRIVATE

## 2024-05-13 PROCEDURE — 85027 COMPLETE CBC AUTOMATED: CPT

## 2024-05-13 PROCEDURE — 2580000003 HC RX 258: Performed by: INTERNAL MEDICINE

## 2024-05-13 PROCEDURE — 80048 BASIC METABOLIC PNL TOTAL CA: CPT

## 2024-05-13 PROCEDURE — 94669 MECHANICAL CHEST WALL OSCILL: CPT

## 2024-05-13 PROCEDURE — 6370000000 HC RX 637 (ALT 250 FOR IP): Performed by: NURSE PRACTITIONER

## 2024-05-13 PROCEDURE — 2580000003 HC RX 258: Performed by: STUDENT IN AN ORGANIZED HEALTH CARE EDUCATION/TRAINING PROGRAM

## 2024-05-13 RX ORDER — LORAZEPAM 0.5 MG/1
0.5 TABLET ORAL EVERY 4 HOURS PRN
Status: DISCONTINUED | OUTPATIENT
Start: 2024-05-13 | End: 2024-05-19

## 2024-05-13 RX ADMIN — LORAZEPAM 0.5 MG: 0.5 TABLET ORAL at 21:37

## 2024-05-13 RX ADMIN — GABAPENTIN 600 MG: 300 CAPSULE ORAL at 19:31

## 2024-05-13 RX ADMIN — HYDROXYZINE HYDROCHLORIDE 50 MG: 25 TABLET, FILM COATED ORAL at 03:18

## 2024-05-13 RX ADMIN — IPRATROPIUM BROMIDE AND ALBUTEROL SULFATE 1 DOSE: .5; 2.5 SOLUTION RESPIRATORY (INHALATION) at 23:59

## 2024-05-13 RX ADMIN — ACETAMINOPHEN 650 MG: 325 TABLET ORAL at 14:18

## 2024-05-13 RX ADMIN — Medication 2 PUFF: at 20:31

## 2024-05-13 RX ADMIN — TIOTROPIUM BROMIDE INHALATION SPRAY 2 PUFF: 3.12 SPRAY, METERED RESPIRATORY (INHALATION) at 08:50

## 2024-05-13 RX ADMIN — IPRATROPIUM BROMIDE AND ALBUTEROL SULFATE 1 DOSE: .5; 2.5 SOLUTION RESPIRATORY (INHALATION) at 12:07

## 2024-05-13 RX ADMIN — CEFTRIAXONE SODIUM 1000 MG: 1 INJECTION, POWDER, FOR SOLUTION INTRAMUSCULAR; INTRAVENOUS at 08:42

## 2024-05-13 RX ADMIN — WATER 40 MG: 1 INJECTION INTRAMUSCULAR; INTRAVENOUS; SUBCUTANEOUS at 19:35

## 2024-05-13 RX ADMIN — PANTOPRAZOLE SODIUM 40 MG: 40 TABLET, DELAYED RELEASE ORAL at 08:40

## 2024-05-13 RX ADMIN — ENOXAPARIN SODIUM 40 MG: 100 INJECTION SUBCUTANEOUS at 08:42

## 2024-05-13 RX ADMIN — GABAPENTIN 600 MG: 300 CAPSULE ORAL at 08:41

## 2024-05-13 RX ADMIN — Medication 10 ML: at 08:43

## 2024-05-13 RX ADMIN — GABAPENTIN 600 MG: 300 CAPSULE ORAL at 14:18

## 2024-05-13 RX ADMIN — ATORVASTATIN CALCIUM 20 MG: 10 TABLET, FILM COATED ORAL at 08:41

## 2024-05-13 RX ADMIN — IPRATROPIUM BROMIDE AND ALBUTEROL SULFATE 1 DOSE: .5; 2.5 SOLUTION RESPIRATORY (INHALATION) at 20:31

## 2024-05-13 RX ADMIN — WATER 40 MG: 1 INJECTION INTRAMUSCULAR; INTRAVENOUS; SUBCUTANEOUS at 08:41

## 2024-05-13 RX ADMIN — Medication 2 PUFF: at 08:50

## 2024-05-13 RX ADMIN — GUAIFENESIN AND DEXTROMETHORPHAN 10 ML: 100; 10 SYRUP ORAL at 21:37

## 2024-05-13 RX ADMIN — Medication 10 ML: at 19:32

## 2024-05-13 RX ADMIN — AZITHROMYCIN 250 MG: 250 TABLET, FILM COATED ORAL at 08:41

## 2024-05-13 RX ADMIN — SODIUM CHLORIDE: 9 INJECTION, SOLUTION INTRAVENOUS at 08:40

## 2024-05-13 RX ADMIN — GUAIFENESIN AND DEXTROMETHORPHAN 10 ML: 100; 10 SYRUP ORAL at 10:43

## 2024-05-13 RX ADMIN — IPRATROPIUM BROMIDE AND ALBUTEROL SULFATE 1 DOSE: .5; 2.5 SOLUTION RESPIRATORY (INHALATION) at 15:56

## 2024-05-13 RX ADMIN — ACETAMINOPHEN 650 MG: 325 TABLET ORAL at 19:31

## 2024-05-13 RX ADMIN — GUAIFENESIN 600 MG: 600 TABLET ORAL at 19:31

## 2024-05-13 RX ADMIN — IPRATROPIUM BROMIDE AND ALBUTEROL SULFATE 1 DOSE: .5; 2.5 SOLUTION RESPIRATORY (INHALATION) at 04:00

## 2024-05-13 RX ADMIN — MONTELUKAST SODIUM 10 MG: 10 TABLET, COATED ORAL at 19:31

## 2024-05-13 RX ADMIN — ACETAMINOPHEN 650 MG: 325 TABLET ORAL at 03:15

## 2024-05-13 RX ADMIN — TRAZODONE HYDROCHLORIDE 100 MG: 50 TABLET ORAL at 19:31

## 2024-05-13 RX ADMIN — LORAZEPAM 0.5 MG: 0.5 TABLET ORAL at 05:10

## 2024-05-13 RX ADMIN — GUAIFENESIN 600 MG: 600 TABLET ORAL at 08:41

## 2024-05-13 RX ADMIN — HYDROXYZINE HYDROCHLORIDE 50 MG: 25 TABLET, FILM COATED ORAL at 10:45

## 2024-05-13 RX ADMIN — IPRATROPIUM BROMIDE AND ALBUTEROL SULFATE 1 DOSE: .5; 2.5 SOLUTION RESPIRATORY (INHALATION) at 08:50

## 2024-05-13 RX ADMIN — POLYETHYLENE GLYCOL 3350 17 G: 17 POWDER, FOR SOLUTION ORAL at 08:41

## 2024-05-13 ASSESSMENT — PAIN DESCRIPTION - PAIN TYPE
TYPE: ACUTE PAIN
TYPE: ACUTE PAIN

## 2024-05-13 ASSESSMENT — PAIN - FUNCTIONAL ASSESSMENT: PAIN_FUNCTIONAL_ASSESSMENT: PREVENTS OR INTERFERES SOME ACTIVE ACTIVITIES AND ADLS

## 2024-05-13 ASSESSMENT — PAIN SCALES - GENERAL
PAINLEVEL_OUTOF10: 4
PAINLEVEL_OUTOF10: 8

## 2024-05-13 ASSESSMENT — PAIN DESCRIPTION - LOCATION
LOCATION: BACK
LOCATION: BACK;GENERALIZED

## 2024-05-13 ASSESSMENT — PAIN DESCRIPTION - FREQUENCY: FREQUENCY: CONTINUOUS

## 2024-05-13 ASSESSMENT — PAIN SCALES - WONG BAKER: WONGBAKER_NUMERICALRESPONSE: HURTS EVEN MORE

## 2024-05-13 ASSESSMENT — PAIN DESCRIPTION - ORIENTATION: ORIENTATION: LOWER

## 2024-05-13 ASSESSMENT — PAIN DESCRIPTION - DESCRIPTORS
DESCRIPTORS: ACHING
DESCRIPTORS: ACHING

## 2024-05-13 ASSESSMENT — PAIN DESCRIPTION - ONSET: ONSET: ON-GOING

## 2024-05-13 NOTE — CARE COORDINATION
Chart reviewed day 5. Pt is followed by IM. Pt still requiring 2 L O2 at this time; RA baseline. Anticipate need for new O2 and HHC SN? [facility] following. Pt from home w/elderly mother and caregiver. Will follow for needs as they arise. Electronically signed by KAITLIN MARIE RN on 5/13/2024 at 3:25 PM

## 2024-05-14 PROCEDURE — 2580000003 HC RX 258: Performed by: INTERNAL MEDICINE

## 2024-05-14 PROCEDURE — 94761 N-INVAS EAR/PLS OXIMETRY MLT: CPT

## 2024-05-14 PROCEDURE — 6370000000 HC RX 637 (ALT 250 FOR IP): Performed by: NURSE PRACTITIONER

## 2024-05-14 PROCEDURE — 1200000000 HC SEMI PRIVATE

## 2024-05-14 PROCEDURE — 36415 COLL VENOUS BLD VENIPUNCTURE: CPT

## 2024-05-14 PROCEDURE — 6370000000 HC RX 637 (ALT 250 FOR IP): Performed by: INTERNAL MEDICINE

## 2024-05-14 PROCEDURE — 94667 MNPJ CHEST WALL 1ST: CPT

## 2024-05-14 PROCEDURE — 6370000000 HC RX 637 (ALT 250 FOR IP): Performed by: STUDENT IN AN ORGANIZED HEALTH CARE EDUCATION/TRAINING PROGRAM

## 2024-05-14 PROCEDURE — 2700000000 HC OXYGEN THERAPY PER DAY

## 2024-05-14 PROCEDURE — 6360000002 HC RX W HCPCS: Performed by: INTERNAL MEDICINE

## 2024-05-14 PROCEDURE — 94669 MECHANICAL CHEST WALL OSCILL: CPT

## 2024-05-14 PROCEDURE — 82103 ALPHA-1-ANTITRYPSIN TOTAL: CPT

## 2024-05-14 PROCEDURE — 82104 ALPHA-1-ANTITRYPSIN PHENO: CPT

## 2024-05-14 PROCEDURE — 99223 1ST HOSP IP/OBS HIGH 75: CPT | Performed by: INTERNAL MEDICINE

## 2024-05-14 PROCEDURE — 94640 AIRWAY INHALATION TREATMENT: CPT

## 2024-05-14 RX ORDER — ACETYLCYSTEINE 200 MG/ML
600 SOLUTION ORAL; RESPIRATORY (INHALATION)
Status: DISCONTINUED | OUTPATIENT
Start: 2024-05-14 | End: 2024-05-21 | Stop reason: HOSPADM

## 2024-05-14 RX ORDER — IPRATROPIUM BROMIDE AND ALBUTEROL SULFATE 2.5; .5 MG/3ML; MG/3ML
1 SOLUTION RESPIRATORY (INHALATION) EVERY 4 HOURS PRN
Status: DISCONTINUED | OUTPATIENT
Start: 2024-05-14 | End: 2024-05-14

## 2024-05-14 RX ORDER — ALBUTEROL SULFATE 2.5 MG/3ML
2.5 SOLUTION RESPIRATORY (INHALATION) EVERY 4 HOURS PRN
Status: DISCONTINUED | OUTPATIENT
Start: 2024-05-14 | End: 2024-05-19

## 2024-05-14 RX ORDER — GABAPENTIN 300 MG/1
600 CAPSULE ORAL 3 TIMES DAILY
Status: DISCONTINUED | OUTPATIENT
Start: 2024-05-14 | End: 2024-05-21 | Stop reason: HOSPADM

## 2024-05-14 RX ORDER — TRAMADOL HYDROCHLORIDE 50 MG/1
50 TABLET ORAL EVERY 6 HOURS PRN
Status: DISCONTINUED | OUTPATIENT
Start: 2024-05-14 | End: 2024-05-21 | Stop reason: HOSPADM

## 2024-05-14 RX ADMIN — HYDROXYZINE HYDROCHLORIDE 50 MG: 25 TABLET, FILM COATED ORAL at 23:25

## 2024-05-14 RX ADMIN — IPRATROPIUM BROMIDE AND ALBUTEROL SULFATE 1 DOSE: .5; 2.5 SOLUTION RESPIRATORY (INHALATION) at 08:43

## 2024-05-14 RX ADMIN — GUAIFENESIN 600 MG: 600 TABLET ORAL at 09:17

## 2024-05-14 RX ADMIN — IPRATROPIUM BROMIDE AND ALBUTEROL SULFATE 1 DOSE: .5; 2.5 SOLUTION RESPIRATORY (INHALATION) at 12:08

## 2024-05-14 RX ADMIN — PANTOPRAZOLE SODIUM 40 MG: 40 TABLET, DELAYED RELEASE ORAL at 09:17

## 2024-05-14 RX ADMIN — ATORVASTATIN CALCIUM 20 MG: 10 TABLET, FILM COATED ORAL at 09:17

## 2024-05-14 RX ADMIN — MONTELUKAST SODIUM 10 MG: 10 TABLET, COATED ORAL at 19:45

## 2024-05-14 RX ADMIN — GABAPENTIN 600 MG: 300 CAPSULE ORAL at 19:45

## 2024-05-14 RX ADMIN — WATER 40 MG: 1 INJECTION INTRAMUSCULAR; INTRAVENOUS; SUBCUTANEOUS at 09:17

## 2024-05-14 RX ADMIN — GABAPENTIN 600 MG: 300 CAPSULE ORAL at 09:17

## 2024-05-14 RX ADMIN — Medication 10 ML: at 09:18

## 2024-05-14 RX ADMIN — ACETAMINOPHEN 650 MG: 325 TABLET ORAL at 09:21

## 2024-05-14 RX ADMIN — Medication 10 ML: at 19:46

## 2024-05-14 RX ADMIN — TRAMADOL HYDROCHLORIDE 50 MG: 50 TABLET ORAL at 12:26

## 2024-05-14 RX ADMIN — GUAIFENESIN AND DEXTROMETHORPHAN 10 ML: 100; 10 SYRUP ORAL at 09:21

## 2024-05-14 RX ADMIN — TRAZODONE HYDROCHLORIDE 100 MG: 50 TABLET ORAL at 19:45

## 2024-05-14 RX ADMIN — IPRATROPIUM BROMIDE AND ALBUTEROL SULFATE 1 DOSE: .5; 2.5 SOLUTION RESPIRATORY (INHALATION) at 19:42

## 2024-05-14 RX ADMIN — Medication 2 PUFF: at 08:43

## 2024-05-14 RX ADMIN — TIOTROPIUM BROMIDE INHALATION SPRAY 2 PUFF: 3.12 SPRAY, METERED RESPIRATORY (INHALATION) at 08:43

## 2024-05-14 RX ADMIN — GABAPENTIN 600 MG: 300 CAPSULE ORAL at 13:19

## 2024-05-14 RX ADMIN — ENOXAPARIN SODIUM 40 MG: 100 INJECTION SUBCUTANEOUS at 09:17

## 2024-05-14 RX ADMIN — Medication 2 PUFF: at 20:02

## 2024-05-14 RX ADMIN — GUAIFENESIN 600 MG: 600 TABLET ORAL at 19:45

## 2024-05-14 RX ADMIN — TRAMADOL HYDROCHLORIDE 50 MG: 50 TABLET ORAL at 19:48

## 2024-05-14 RX ADMIN — WATER 40 MG: 1 INJECTION INTRAMUSCULAR; INTRAVENOUS; SUBCUTANEOUS at 19:49

## 2024-05-14 RX ADMIN — IPRATROPIUM BROMIDE AND ALBUTEROL SULFATE 1 DOSE: .5; 2.5 SOLUTION RESPIRATORY (INHALATION) at 04:11

## 2024-05-14 RX ADMIN — ACETYLCYSTEINE 600 MG: 200 INHALANT RESPIRATORY (INHALATION) at 19:41

## 2024-05-14 RX ADMIN — HYDROXYZINE HYDROCHLORIDE 50 MG: 25 TABLET, FILM COATED ORAL at 09:19

## 2024-05-14 ASSESSMENT — PAIN DESCRIPTION - FREQUENCY: FREQUENCY: CONTINUOUS

## 2024-05-14 ASSESSMENT — PAIN SCALES - GENERAL
PAINLEVEL_OUTOF10: 6
PAINLEVEL_OUTOF10: 4
PAINLEVEL_OUTOF10: 7

## 2024-05-14 ASSESSMENT — PAIN DESCRIPTION - ONSET: ONSET: ON-GOING

## 2024-05-14 ASSESSMENT — PAIN DESCRIPTION - DESCRIPTORS: DESCRIPTORS: ACHING;DISCOMFORT

## 2024-05-14 ASSESSMENT — PAIN - FUNCTIONAL ASSESSMENT: PAIN_FUNCTIONAL_ASSESSMENT: PREVENTS OR INTERFERES SOME ACTIVE ACTIVITIES AND ADLS

## 2024-05-14 ASSESSMENT — PAIN DESCRIPTION - ORIENTATION: ORIENTATION: LOWER

## 2024-05-14 ASSESSMENT — PAIN DESCRIPTION - LOCATION
LOCATION: BACK;CHEST
LOCATION: CHEST;BACK

## 2024-05-14 ASSESSMENT — PAIN DESCRIPTION - PAIN TYPE: TYPE: CHRONIC PAIN

## 2024-05-14 ASSESSMENT — PAIN SCALES - WONG BAKER: WONGBAKER_NUMERICALRESPONSE: HURTS A LITTLE BIT

## 2024-05-14 NOTE — RT PROTOCOL NOTE
RT Inhaler-Nebulizer Bronchodilator Protocol Note    There is a bronchodilator order in the chart from a provider indicating to follow the RT Bronchodilator Protocol and there is an “Initiate RT Inhaler-Nebulizer Bronchodilator Protocol” order as well (see protocol at bottom of note).    CXR Findings:  No results found.    The findings from the last RT Protocol Assessment were as follows:   History Pulmonary Disease: Chronic pulmonary disease  Respiratory Pattern: Mild dyspnea at rest, irregular pattern, or RR 21-25 bpm  Breath Sounds: Inspiratory and expiratory or bilateral wheezing and/or rhonchi  Cough: Strong, productive  Indication for Bronchodilator Therapy: On home bronchodilators, Wheezing associated with pulm disorder, Decreased or absent breath sounds  Bronchodilator Assessment Score: 13    Aerosolized bronchodilator medication orders have been revised according to the RT Inhaler-Nebulizer Bronchodilator Protocol below.    Respiratory Therapist to perform RT Therapy Protocol Assessment initially then follow the protocol.  Repeat RT Therapy Protocol Assessment PRN for score 0-3 or on second treatment, BID, and PRN for scores above 3.    No Indications - adjust the frequency to every 6 hours PRN wheezing or bronchospasm, if no treatments needed after 48 hours then discontinue using Per Protocol order mode.     If indication present, adjust the RT bronchodilator orders based on the Bronchodilator Assessment Score as indicated below.  Use Inhaler orders unless patient has one or more of the following: on home nebulizer, not able to hold breath for 10 seconds, is not alert and oriented, cannot activate and use MDI correctly, or respiratory rate 25 breaths per minute or more, then use the equivalent nebulizer order(s) with same Frequency and PRN reasons based on the score.  If a patient is on this medication at home then do not decrease Frequency below that used at home.    0-3 - enter or revise RT 
RT Inhaler-Nebulizer Bronchodilator Protocol Note    There is a bronchodilator order in the chart from a provider indicating to follow the RT Bronchodilator Protocol and there is an “Initiate RT Inhaler-Nebulizer Bronchodilator Protocol” order as well (see protocol at bottom of note).    CXR Findings:  No results found.    The findings from the last RT Protocol Assessment were as follows:   History Pulmonary Disease: Chronic pulmonary disease  Respiratory Pattern: Mild dyspnea at rest, irregular pattern, or RR 21-25 bpm  Breath Sounds: Intermittent or unilateral wheezes  Cough: Strong, spontaneous, non-productive  Indication for Bronchodilator Therapy: Wheezing associated with pulm disorder  Bronchodilator Assessment Score: 10    Aerosolized bronchodilator medication orders have been revised according to the RT Inhaler-Nebulizer Bronchodilator Protocol below.    Respiratory Therapist to perform RT Therapy Protocol Assessment initially then follow the protocol.  Repeat RT Therapy Protocol Assessment PRN for score 0-3 or on second treatment, BID, and PRN for scores above 3.    No Indications - adjust the frequency to every 6 hours PRN wheezing or bronchospasm, if no treatments needed after 48 hours then discontinue using Per Protocol order mode.     If indication present, adjust the RT bronchodilator orders based on the Bronchodilator Assessment Score as indicated below.  Use Inhaler orders unless patient has one or more of the following: on home nebulizer, not able to hold breath for 10 seconds, is not alert and oriented, cannot activate and use MDI correctly, or respiratory rate 25 breaths per minute or more, then use the equivalent nebulizer order(s) with same Frequency and PRN reasons based on the score.  If a patient is on this medication at home then do not decrease Frequency below that used at home.    0-3 - enter or revise RT bronchodilator order(s) to equivalent RT Bronchodilator order with Frequency of every 
RT Inhaler-Nebulizer Bronchodilator Protocol Note    There is a bronchodilator order in the chart from a provider indicating to follow the RT Bronchodilator Protocol and there is an “Initiate RT Inhaler-Nebulizer Bronchodilator Protocol” order as well (see protocol at bottom of note).    CXR Findings:  XR CHEST PORTABLE    Result Date: 5/8/2024  1. No acute cardiopulmonary process. 2. Moderate emphysema.       The findings from the last RT Protocol Assessment were as follows:   History Pulmonary Disease: Chronic pulmonary disease  Respiratory Pattern: Regular pattern and RR 12-20 bpm  Breath Sounds: Clear breath sounds  Cough: Strong, spontaneous, non-productive  Indication for Bronchodilator Therapy: None  Bronchodilator Assessment Score: 2    Aerosolized bronchodilator medication orders have been revised according to the RT Inhaler-Nebulizer Bronchodilator Protocol below.    Respiratory Therapist to perform RT Therapy Protocol Assessment initially then follow the protocol.  Repeat RT Therapy Protocol Assessment PRN for score 0-3 or on second treatment, BID, and PRN for scores above 3.    No Indications - adjust the frequency to every 6 hours PRN wheezing or bronchospasm, if no treatments needed after 48 hours then discontinue using Per Protocol order mode.     If indication present, adjust the RT bronchodilator orders based on the Bronchodilator Assessment Score as indicated below.  Use Inhaler orders unless patient has one or more of the following: on home nebulizer, not able to hold breath for 10 seconds, is not alert and oriented, cannot activate and use MDI correctly, or respiratory rate 25 breaths per minute or more, then use the equivalent nebulizer order(s) with same Frequency and PRN reasons based on the score.  If a patient is on this medication at home then do not decrease Frequency below that used at home.    0-3 - enter or revise RT bronchodilator order(s) to equivalent RT Bronchodilator order with 
Frequency below that used at home.    0-3 - enter or revise RT bronchodilator order(s) to equivalent RT Bronchodilator order with Frequency of every 4 hours PRN for wheezing or increased work of breathing using Per Protocol order mode.        4-6 - enter or revise RT Bronchodilator order(s) to two equivalent RT bronchodilator orders with one order with BID Frequency and one order with Frequency of every 4 hours PRN wheezing or increased work of breathing using Per Protocol order mode.        7-10 - enter or revise RT Bronchodilator order(s) to two equivalent RT bronchodilator orders with one order with TID Frequency and one order with Frequency of every 4 hours PRN wheezing or increased work of breathing using Per Protocol order mode.       11-13 - enter or revise RT Bronchodilator order(s) to one equivalent RT bronchodilator order with QID Frequency and an Albuterol order with Frequency of every 4 hours PRN wheezing or increased work of breathing using Per Protocol order mode.      Greater than 13 - enter or revise RT Bronchodilator order(s) to one equivalent RT bronchodilator order with every 4 hours Frequency and an Albuterol order with Frequency of every 2 hours PRN wheezing or increased work of breathing using Per Protocol order mode.     RT to enter RT Home Evaluation for COPD & MDI Assessment order using Per Protocol order mode.    Electronically signed by Caryl Donohue RCP on 5/12/2024 at 9:45 PM

## 2024-05-14 NOTE — CARE COORDINATION
Chart reviewed day 6. Pt is followed by IM and awaiting Pulm consult completion. Pt w/cont need for 2L o2. Anticipate need for New O2 at d/c; will need walk test. Pt from home w/elderly mother and mothers caretaker. [facility] following. Will follow for needs as they arise. Electronically signed by KAITLIN MARIE RN on 5/14/2024 at 1:27 PM

## 2024-05-15 LAB
BASE EXCESS BLDA CALC-SCNC: -2.6 MMOL/L (ref -3–3)
CO2 BLDA-SCNC: 25 MMOL/L
COHGB MFR BLDA: 0.9 % (ref 0–1.5)
HCO3 BLDA-SCNC: 23.6 MMOL/L (ref 21–29)
HGB BLDA-MCNC: 15.2 G/DL (ref 12–16)
METHGB MFR BLDA: 0.3 %
O2 THERAPY: ABNORMAL
PCO2 BLDA: 46 MMHG (ref 35–45)
PH BLDA: 7.33 [PH] (ref 7.35–7.45)
PO2 BLDA: 28 MMHG (ref 75–108)
SAO2 % BLDA: 50.4 %

## 2024-05-15 PROCEDURE — 6370000000 HC RX 637 (ALT 250 FOR IP): Performed by: STUDENT IN AN ORGANIZED HEALTH CARE EDUCATION/TRAINING PROGRAM

## 2024-05-15 PROCEDURE — 82803 BLOOD GASES ANY COMBINATION: CPT

## 2024-05-15 PROCEDURE — 99233 SBSQ HOSP IP/OBS HIGH 50: CPT | Performed by: INTERNAL MEDICINE

## 2024-05-15 PROCEDURE — 94761 N-INVAS EAR/PLS OXIMETRY MLT: CPT

## 2024-05-15 PROCEDURE — 94762 N-INVAS EAR/PLS OXIMTRY CONT: CPT

## 2024-05-15 PROCEDURE — 6370000000 HC RX 637 (ALT 250 FOR IP): Performed by: INTERNAL MEDICINE

## 2024-05-15 PROCEDURE — 94669 MECHANICAL CHEST WALL OSCILL: CPT

## 2024-05-15 PROCEDURE — 6360000002 HC RX W HCPCS: Performed by: INTERNAL MEDICINE

## 2024-05-15 PROCEDURE — 94640 AIRWAY INHALATION TREATMENT: CPT

## 2024-05-15 PROCEDURE — 6370000000 HC RX 637 (ALT 250 FOR IP): Performed by: NURSE PRACTITIONER

## 2024-05-15 PROCEDURE — 2700000000 HC OXYGEN THERAPY PER DAY

## 2024-05-15 PROCEDURE — 36600 WITHDRAWAL OF ARTERIAL BLOOD: CPT

## 2024-05-15 PROCEDURE — 2580000003 HC RX 258: Performed by: INTERNAL MEDICINE

## 2024-05-15 PROCEDURE — 1200000000 HC SEMI PRIVATE

## 2024-05-15 RX ADMIN — Medication 2 PUFF: at 08:22

## 2024-05-15 RX ADMIN — GUAIFENESIN 600 MG: 600 TABLET ORAL at 08:08

## 2024-05-15 RX ADMIN — ACETYLCYSTEINE 600 MG: 200 INHALANT RESPIRATORY (INHALATION) at 08:22

## 2024-05-15 RX ADMIN — LORAZEPAM 0.5 MG: 0.5 TABLET ORAL at 16:09

## 2024-05-15 RX ADMIN — ALBUTEROL SULFATE 2.5 MG: 2.5 SOLUTION RESPIRATORY (INHALATION) at 08:22

## 2024-05-15 RX ADMIN — WATER 40 MG: 1 INJECTION INTRAMUSCULAR; INTRAVENOUS; SUBCUTANEOUS at 08:07

## 2024-05-15 RX ADMIN — GUAIFENESIN 600 MG: 600 TABLET ORAL at 20:35

## 2024-05-15 RX ADMIN — PANTOPRAZOLE SODIUM 40 MG: 40 TABLET, DELAYED RELEASE ORAL at 08:08

## 2024-05-15 RX ADMIN — Medication 10 ML: at 08:08

## 2024-05-15 RX ADMIN — TRAMADOL HYDROCHLORIDE 50 MG: 50 TABLET ORAL at 08:12

## 2024-05-15 RX ADMIN — HYDROXYZINE HYDROCHLORIDE 50 MG: 25 TABLET, FILM COATED ORAL at 14:37

## 2024-05-15 RX ADMIN — ACETYLCYSTEINE 600 MG: 200 INHALANT RESPIRATORY (INHALATION) at 19:25

## 2024-05-15 RX ADMIN — WATER 40 MG: 1 INJECTION INTRAMUSCULAR; INTRAVENOUS; SUBCUTANEOUS at 20:35

## 2024-05-15 RX ADMIN — GABAPENTIN 600 MG: 300 CAPSULE ORAL at 14:37

## 2024-05-15 RX ADMIN — ATORVASTATIN CALCIUM 20 MG: 10 TABLET, FILM COATED ORAL at 08:08

## 2024-05-15 RX ADMIN — GABAPENTIN 600 MG: 300 CAPSULE ORAL at 20:35

## 2024-05-15 RX ADMIN — Medication 10 ML: at 20:35

## 2024-05-15 RX ADMIN — GABAPENTIN 600 MG: 300 CAPSULE ORAL at 08:08

## 2024-05-15 RX ADMIN — TRAMADOL HYDROCHLORIDE 50 MG: 50 TABLET ORAL at 21:12

## 2024-05-15 RX ADMIN — Medication 2 PUFF: at 19:24

## 2024-05-15 RX ADMIN — MONTELUKAST SODIUM 10 MG: 10 TABLET, COATED ORAL at 20:35

## 2024-05-15 RX ADMIN — TIOTROPIUM BROMIDE INHALATION SPRAY 2 PUFF: 3.12 SPRAY, METERED RESPIRATORY (INHALATION) at 08:22

## 2024-05-15 RX ADMIN — ALBUTEROL SULFATE 2.5 MG: 2.5 SOLUTION RESPIRATORY (INHALATION) at 19:24

## 2024-05-15 RX ADMIN — ENOXAPARIN SODIUM 40 MG: 100 INJECTION SUBCUTANEOUS at 08:08

## 2024-05-15 RX ADMIN — TRAMADOL HYDROCHLORIDE 50 MG: 50 TABLET ORAL at 14:40

## 2024-05-15 ASSESSMENT — PAIN DESCRIPTION - LOCATION
LOCATION: BACK
LOCATION: BACK;RIB CAGE
LOCATION: BACK;RIB CAGE
LOCATION: BACK

## 2024-05-15 ASSESSMENT — PAIN DESCRIPTION - PAIN TYPE
TYPE: CHRONIC PAIN

## 2024-05-15 ASSESSMENT — PAIN DESCRIPTION - ORIENTATION
ORIENTATION: RIGHT;LEFT;LOWER
ORIENTATION: LOWER
ORIENTATION: RIGHT;LEFT;LOWER
ORIENTATION: LOWER

## 2024-05-15 ASSESSMENT — PAIN SCALES - GENERAL
PAINLEVEL_OUTOF10: 6
PAINLEVEL_OUTOF10: 5
PAINLEVEL_OUTOF10: 7
PAINLEVEL_OUTOF10: 6

## 2024-05-15 ASSESSMENT — PAIN DESCRIPTION - FREQUENCY
FREQUENCY: CONTINUOUS
FREQUENCY: CONTINUOUS

## 2024-05-15 ASSESSMENT — PAIN - FUNCTIONAL ASSESSMENT
PAIN_FUNCTIONAL_ASSESSMENT: PREVENTS OR INTERFERES SOME ACTIVE ACTIVITIES AND ADLS

## 2024-05-15 ASSESSMENT — PAIN DESCRIPTION - DESCRIPTORS
DESCRIPTORS: ACHING
DESCRIPTORS: ACHING;DISCOMFORT
DESCRIPTORS: ACHING
DESCRIPTORS: ACHING;DISCOMFORT

## 2024-05-15 ASSESSMENT — PAIN DESCRIPTION - ONSET: ONSET: ON-GOING

## 2024-05-16 ENCOUNTER — ANESTHESIA (OUTPATIENT)
Dept: ENDOSCOPY | Age: 69
DRG: 193 | End: 2024-05-16
Payer: MEDICARE

## 2024-05-16 ENCOUNTER — ANESTHESIA EVENT (OUTPATIENT)
Dept: ENDOSCOPY | Age: 69
DRG: 193 | End: 2024-05-16
Payer: MEDICARE

## 2024-05-16 PROCEDURE — 6360000002 HC RX W HCPCS: Performed by: INTERNAL MEDICINE

## 2024-05-16 PROCEDURE — 87205 SMEAR GRAM STAIN: CPT

## 2024-05-16 PROCEDURE — 99152 MOD SED SAME PHYS/QHP 5/>YRS: CPT | Performed by: INTERNAL MEDICINE

## 2024-05-16 PROCEDURE — 3609027000 HC BRONCHOSCOPY: Performed by: INTERNAL MEDICINE

## 2024-05-16 PROCEDURE — 87206 SMEAR FLUORESCENT/ACID STAI: CPT

## 2024-05-16 PROCEDURE — 2709999900 HC NON-CHARGEABLE SUPPLY: Performed by: INTERNAL MEDICINE

## 2024-05-16 PROCEDURE — 87102 FUNGUS ISOLATION CULTURE: CPT

## 2024-05-16 PROCEDURE — 94640 AIRWAY INHALATION TREATMENT: CPT

## 2024-05-16 PROCEDURE — 7100000011 HC PHASE II RECOVERY - ADDTL 15 MIN: Performed by: INTERNAL MEDICINE

## 2024-05-16 PROCEDURE — 94761 N-INVAS EAR/PLS OXIMETRY MLT: CPT

## 2024-05-16 PROCEDURE — 2580000003 HC RX 258: Performed by: INTERNAL MEDICINE

## 2024-05-16 PROCEDURE — 2700000000 HC OXYGEN THERAPY PER DAY

## 2024-05-16 PROCEDURE — 0BC18ZZ EXTIRPATION OF MATTER FROM TRACHEA, VIA NATURAL OR ARTIFICIAL OPENING ENDOSCOPIC: ICD-10-PCS | Performed by: INTERNAL MEDICINE

## 2024-05-16 PROCEDURE — 6370000000 HC RX 637 (ALT 250 FOR IP): Performed by: INTERNAL MEDICINE

## 2024-05-16 PROCEDURE — 31622 DX BRONCHOSCOPE/WASH: CPT | Performed by: INTERNAL MEDICINE

## 2024-05-16 PROCEDURE — 6370000000 HC RX 637 (ALT 250 FOR IP): Performed by: NURSE PRACTITIONER

## 2024-05-16 PROCEDURE — 87116 MYCOBACTERIA CULTURE: CPT

## 2024-05-16 PROCEDURE — 99233 SBSQ HOSP IP/OBS HIGH 50: CPT | Performed by: INTERNAL MEDICINE

## 2024-05-16 PROCEDURE — 87070 CULTURE OTHR SPECIMN AEROBIC: CPT

## 2024-05-16 PROCEDURE — 94669 MECHANICAL CHEST WALL OSCILL: CPT

## 2024-05-16 PROCEDURE — 0BC38ZZ EXTIRPATION OF MATTER FROM RIGHT MAIN BRONCHUS, VIA NATURAL OR ARTIFICIAL OPENING ENDOSCOPIC: ICD-10-PCS | Performed by: INTERNAL MEDICINE

## 2024-05-16 PROCEDURE — C9113 INJ PANTOPRAZOLE SODIUM, VIA: HCPCS | Performed by: INTERNAL MEDICINE

## 2024-05-16 PROCEDURE — 0B918ZZ DRAINAGE OF TRACHEA, VIA NATURAL OR ARTIFICIAL OPENING ENDOSCOPIC: ICD-10-PCS | Performed by: INTERNAL MEDICINE

## 2024-05-16 PROCEDURE — 2060000000 HC ICU INTERMEDIATE R&B

## 2024-05-16 PROCEDURE — 99153 MOD SED SAME PHYS/QHP EA: CPT | Performed by: INTERNAL MEDICINE

## 2024-05-16 PROCEDURE — 7100000010 HC PHASE II RECOVERY - FIRST 15 MIN: Performed by: INTERNAL MEDICINE

## 2024-05-16 PROCEDURE — 6370000000 HC RX 637 (ALT 250 FOR IP): Performed by: STUDENT IN AN ORGANIZED HEALTH CARE EDUCATION/TRAINING PROGRAM

## 2024-05-16 PROCEDURE — 87106 FUNGI IDENTIFICATION YEAST: CPT

## 2024-05-16 PROCEDURE — A4217 STERILE WATER/SALINE, 500 ML: HCPCS | Performed by: INTERNAL MEDICINE

## 2024-05-16 RX ORDER — FENTANYL CITRATE 50 UG/ML
25 INJECTION, SOLUTION INTRAMUSCULAR; INTRAVENOUS
Status: DISCONTINUED | OUTPATIENT
Start: 2024-05-16 | End: 2024-05-21 | Stop reason: HOSPADM

## 2024-05-16 RX ORDER — LORAZEPAM 2 MG/ML
0.5 INJECTION INTRAMUSCULAR
Status: COMPLETED | OUTPATIENT
Start: 2024-05-16 | End: 2024-05-16

## 2024-05-16 RX ORDER — LIDOCAINE HYDROCHLORIDE 20 MG/ML
SOLUTION OROPHARYNGEAL PRN
Status: DISCONTINUED | OUTPATIENT
Start: 2024-05-16 | End: 2024-05-16 | Stop reason: ALTCHOICE

## 2024-05-16 RX ORDER — MIDAZOLAM HYDROCHLORIDE 5 MG/ML
INJECTION INTRAMUSCULAR; INTRAVENOUS PRN
Status: DISCONTINUED | OUTPATIENT
Start: 2024-05-16 | End: 2024-05-16 | Stop reason: ALTCHOICE

## 2024-05-16 RX ORDER — FENTANYL CITRATE 50 UG/ML
INJECTION, SOLUTION INTRAMUSCULAR; INTRAVENOUS PRN
Status: DISCONTINUED | OUTPATIENT
Start: 2024-05-16 | End: 2024-05-16 | Stop reason: ALTCHOICE

## 2024-05-16 RX ORDER — SODIUM CHLORIDE, SODIUM LACTATE, POTASSIUM CHLORIDE, CALCIUM CHLORIDE 600; 310; 30; 20 MG/100ML; MG/100ML; MG/100ML; MG/100ML
INJECTION, SOLUTION INTRAVENOUS CONTINUOUS
Status: DISCONTINUED | OUTPATIENT
Start: 2024-05-16 | End: 2024-05-16 | Stop reason: HOSPADM

## 2024-05-16 RX ORDER — MAGNESIUM HYDROXIDE 1200 MG/15ML
LIQUID ORAL CONTINUOUS PRN
Status: COMPLETED | OUTPATIENT
Start: 2024-05-16 | End: 2024-05-16

## 2024-05-16 RX ORDER — PANTOPRAZOLE SODIUM 40 MG/10ML
40 INJECTION, POWDER, LYOPHILIZED, FOR SOLUTION INTRAVENOUS DAILY
Status: DISCONTINUED | OUTPATIENT
Start: 2024-05-16 | End: 2024-05-21

## 2024-05-16 RX ADMIN — GABAPENTIN 600 MG: 300 CAPSULE ORAL at 20:50

## 2024-05-16 RX ADMIN — Medication 2 PUFF: at 07:35

## 2024-05-16 RX ADMIN — TRAZODONE HYDROCHLORIDE 100 MG: 50 TABLET ORAL at 21:22

## 2024-05-16 RX ADMIN — ALBUTEROL SULFATE 2.5 MG: 2.5 SOLUTION RESPIRATORY (INHALATION) at 07:35

## 2024-05-16 RX ADMIN — WATER 40 MG: 1 INJECTION INTRAMUSCULAR; INTRAVENOUS; SUBCUTANEOUS at 20:50

## 2024-05-16 RX ADMIN — ACETYLCYSTEINE 600 MG: 200 INHALANT RESPIRATORY (INHALATION) at 20:15

## 2024-05-16 RX ADMIN — MONTELUKAST SODIUM 10 MG: 10 TABLET, COATED ORAL at 20:50

## 2024-05-16 RX ADMIN — TIZANIDINE 4 MG: 4 TABLET ORAL at 17:16

## 2024-05-16 RX ADMIN — TIOTROPIUM BROMIDE INHALATION SPRAY 2 PUFF: 3.12 SPRAY, METERED RESPIRATORY (INHALATION) at 07:35

## 2024-05-16 RX ADMIN — Medication 2 PUFF: at 20:24

## 2024-05-16 RX ADMIN — ACETYLCYSTEINE 600 MG: 200 INHALANT RESPIRATORY (INHALATION) at 07:35

## 2024-05-16 RX ADMIN — ONDANSETRON 4 MG: 2 INJECTION INTRAMUSCULAR; INTRAVENOUS at 05:32

## 2024-05-16 RX ADMIN — SODIUM CHLORIDE, POTASSIUM CHLORIDE, SODIUM LACTATE AND CALCIUM CHLORIDE: 600; 310; 30; 20 INJECTION, SOLUTION INTRAVENOUS at 13:38

## 2024-05-16 RX ADMIN — LORAZEPAM 0.5 MG: 2 INJECTION INTRAMUSCULAR; INTRAVENOUS at 16:55

## 2024-05-16 RX ADMIN — PANTOPRAZOLE SODIUM 40 MG: 40 INJECTION, POWDER, FOR SOLUTION INTRAVENOUS at 09:41

## 2024-05-16 RX ADMIN — ALBUTEROL SULFATE 2.5 MG: 2.5 SOLUTION RESPIRATORY (INHALATION) at 20:15

## 2024-05-16 RX ADMIN — GUAIFENESIN 600 MG: 600 TABLET ORAL at 20:50

## 2024-05-16 RX ADMIN — ALBUTEROL SULFATE 2.5 MG: 2.5 SOLUTION RESPIRATORY (INHALATION) at 13:32

## 2024-05-16 RX ADMIN — FENTANYL CITRATE 25 MCG: 50 INJECTION INTRAMUSCULAR; INTRAVENOUS at 17:04

## 2024-05-16 RX ADMIN — WATER 40 MG: 1 INJECTION INTRAMUSCULAR; INTRAVENOUS; SUBCUTANEOUS at 09:40

## 2024-05-16 RX ADMIN — Medication 10 ML: at 20:50

## 2024-05-16 RX ADMIN — TRAMADOL HYDROCHLORIDE 50 MG: 50 TABLET ORAL at 17:16

## 2024-05-16 ASSESSMENT — PAIN SCALES - WONG BAKER
WONGBAKER_NUMERICALRESPONSE: NO HURT

## 2024-05-16 ASSESSMENT — PAIN DESCRIPTION - LOCATION: LOCATION: THROAT;BACK

## 2024-05-16 ASSESSMENT — PAIN DESCRIPTION - DESCRIPTORS: DESCRIPTORS: ACHING

## 2024-05-16 ASSESSMENT — PAIN - FUNCTIONAL ASSESSMENT: PAIN_FUNCTIONAL_ASSESSMENT: 0-10

## 2024-05-16 ASSESSMENT — PAIN SCALES - GENERAL
PAINLEVEL_OUTOF10: 10
PAINLEVEL_OUTOF10: 0
PAINLEVEL_OUTOF10: 0
PAINLEVEL_OUTOF10: 10
PAINLEVEL_OUTOF10: 0
PAINLEVEL_OUTOF10: 10
PAINLEVEL_OUTOF10: 6
PAINLEVEL_OUTOF10: 0

## 2024-05-16 ASSESSMENT — PAIN DESCRIPTION - ONSET: ONSET: SUDDEN

## 2024-05-16 ASSESSMENT — PAIN DESCRIPTION - FREQUENCY: FREQUENCY: CONTINUOUS

## 2024-05-16 ASSESSMENT — PAIN DESCRIPTION - PAIN TYPE: TYPE: ACUTE PAIN;CHRONIC PAIN

## 2024-05-16 NOTE — PROCEDURES
has to be done under general anesthesia.  Patient was very upset about her experience and I totally understand.    _______________________________  Electronically signed by:  Olivier Ellis MD,FACP    5/16/2024    5:09 PM.     [facility]  [address], [place] [address]   Phone (office): [phone]

## 2024-05-16 NOTE — CARE COORDINATION
Chart reviewed day 8. Pt is followed by IM and Pulm. Currently down for a Bronch. Cont 2L o2. Per RN pt w/SOB w/ambulation. New o2 at d/c? [facility] following. Pt lives w/elderly mother and mothers caretaker. Will follow for needs as they arise. Electronically signed by KAITLIN MARIE RN on 5/16/2024 at 1:58 PM

## 2024-05-17 LAB — ACID FAST STN SPEC QL: NORMAL

## 2024-05-17 PROCEDURE — 94640 AIRWAY INHALATION TREATMENT: CPT

## 2024-05-17 PROCEDURE — 94761 N-INVAS EAR/PLS OXIMETRY MLT: CPT

## 2024-05-17 PROCEDURE — 6370000000 HC RX 637 (ALT 250 FOR IP): Performed by: INTERNAL MEDICINE

## 2024-05-17 PROCEDURE — 6360000002 HC RX W HCPCS: Performed by: INTERNAL MEDICINE

## 2024-05-17 PROCEDURE — 2580000003 HC RX 258: Performed by: INTERNAL MEDICINE

## 2024-05-17 PROCEDURE — 94669 MECHANICAL CHEST WALL OSCILL: CPT

## 2024-05-17 PROCEDURE — 2060000000 HC ICU INTERMEDIATE R&B

## 2024-05-17 PROCEDURE — 2700000000 HC OXYGEN THERAPY PER DAY

## 2024-05-17 PROCEDURE — C9113 INJ PANTOPRAZOLE SODIUM, VIA: HCPCS | Performed by: INTERNAL MEDICINE

## 2024-05-17 PROCEDURE — 6370000000 HC RX 637 (ALT 250 FOR IP): Performed by: NURSE PRACTITIONER

## 2024-05-17 RX ORDER — CHOLECALCIFEROL (VITAMIN D3) 125 MCG
250 CAPSULE ORAL DAILY
Status: DISCONTINUED | OUTPATIENT
Start: 2024-05-17 | End: 2024-05-21 | Stop reason: HOSPADM

## 2024-05-17 RX ORDER — HYDROCODONE BITARTRATE AND ACETAMINOPHEN 5; 325 MG/1; MG/1
1 TABLET ORAL EVERY 4 HOURS PRN
Status: DISCONTINUED | OUTPATIENT
Start: 2024-05-17 | End: 2024-05-21 | Stop reason: HOSPADM

## 2024-05-17 RX ORDER — VITAMIN B COMPLEX
1000 TABLET ORAL DAILY
Status: DISCONTINUED | OUTPATIENT
Start: 2024-05-17 | End: 2024-05-21 | Stop reason: HOSPADM

## 2024-05-17 RX ORDER — HYDROCODONE BITARTRATE AND ACETAMINOPHEN 5; 325 MG/1; MG/1
1 TABLET ORAL EVERY 6 HOURS PRN
Status: DISCONTINUED | OUTPATIENT
Start: 2024-05-17 | End: 2024-05-17

## 2024-05-17 RX ADMIN — Medication 2 PUFF: at 07:57

## 2024-05-17 RX ADMIN — Medication 10 ML: at 20:46

## 2024-05-17 RX ADMIN — TIOTROPIUM BROMIDE INHALATION SPRAY 2 PUFF: 3.12 SPRAY, METERED RESPIRATORY (INHALATION) at 07:57

## 2024-05-17 RX ADMIN — LORAZEPAM 0.5 MG: 0.5 TABLET ORAL at 23:00

## 2024-05-17 RX ADMIN — WATER 40 MG: 1 INJECTION INTRAMUSCULAR; INTRAVENOUS; SUBCUTANEOUS at 09:09

## 2024-05-17 RX ADMIN — POLYETHYLENE GLYCOL 3350 17 G: 17 POWDER, FOR SOLUTION ORAL at 19:01

## 2024-05-17 RX ADMIN — WATER 40 MG: 1 INJECTION INTRAMUSCULAR; INTRAVENOUS; SUBCUTANEOUS at 20:44

## 2024-05-17 RX ADMIN — ENOXAPARIN SODIUM 40 MG: 100 INJECTION SUBCUTANEOUS at 09:09

## 2024-05-17 RX ADMIN — GUAIFENESIN 600 MG: 600 TABLET ORAL at 20:43

## 2024-05-17 RX ADMIN — HYDROCODONE BITARTRATE AND ACETAMINOPHEN 1 TABLET: 5; 325 TABLET ORAL at 14:24

## 2024-05-17 RX ADMIN — ATORVASTATIN CALCIUM 20 MG: 10 TABLET, FILM COATED ORAL at 09:09

## 2024-05-17 RX ADMIN — HYDROCODONE BITARTRATE AND ACETAMINOPHEN 1 TABLET: 5; 325 TABLET ORAL at 09:07

## 2024-05-17 RX ADMIN — LORAZEPAM 0.5 MG: 0.5 TABLET ORAL at 14:24

## 2024-05-17 RX ADMIN — ALBUTEROL SULFATE 2.5 MG: 2.5 SOLUTION RESPIRATORY (INHALATION) at 20:07

## 2024-05-17 RX ADMIN — HYDROCODONE BITARTRATE AND ACETAMINOPHEN 1 TABLET: 5; 325 TABLET ORAL at 19:00

## 2024-05-17 RX ADMIN — LORAZEPAM 0.5 MG: 0.5 TABLET ORAL at 19:00

## 2024-05-17 RX ADMIN — CYANOCOBALAMIN TAB 500 MCG 250 MCG: 500 TAB at 12:11

## 2024-05-17 RX ADMIN — Medication 2 PUFF: at 20:07

## 2024-05-17 RX ADMIN — TRAMADOL HYDROCHLORIDE 50 MG: 50 TABLET ORAL at 06:30

## 2024-05-17 RX ADMIN — LORAZEPAM 0.5 MG: 0.5 TABLET ORAL at 08:32

## 2024-05-17 RX ADMIN — ONDANSETRON 4 MG: 2 INJECTION INTRAMUSCULAR; INTRAVENOUS at 21:05

## 2024-05-17 RX ADMIN — MONTELUKAST SODIUM 10 MG: 10 TABLET, COATED ORAL at 20:44

## 2024-05-17 RX ADMIN — Medication 1000 UNITS: at 12:11

## 2024-05-17 RX ADMIN — GABAPENTIN 600 MG: 300 CAPSULE ORAL at 09:08

## 2024-05-17 RX ADMIN — TIZANIDINE 4 MG: 4 TABLET ORAL at 20:43

## 2024-05-17 RX ADMIN — HYDROCODONE BITARTRATE AND ACETAMINOPHEN 1 TABLET: 5; 325 TABLET ORAL at 23:00

## 2024-05-17 RX ADMIN — BENZONATATE 200 MG: 100 CAPSULE ORAL at 19:01

## 2024-05-17 RX ADMIN — ONDANSETRON 4 MG: 2 INJECTION INTRAMUSCULAR; INTRAVENOUS at 14:24

## 2024-05-17 RX ADMIN — ALBUTEROL SULFATE 2.5 MG: 2.5 SOLUTION RESPIRATORY (INHALATION) at 07:59

## 2024-05-17 RX ADMIN — GABAPENTIN 600 MG: 300 CAPSULE ORAL at 14:24

## 2024-05-17 RX ADMIN — GABAPENTIN 600 MG: 300 CAPSULE ORAL at 20:43

## 2024-05-17 RX ADMIN — GUAIFENESIN 600 MG: 600 TABLET ORAL at 09:09

## 2024-05-17 RX ADMIN — PANTOPRAZOLE SODIUM 40 MG: 40 INJECTION, POWDER, FOR SOLUTION INTRAVENOUS at 09:09

## 2024-05-17 ASSESSMENT — PAIN SCALES - GENERAL
PAINLEVEL_OUTOF10: 6
PAINLEVEL_OUTOF10: 7
PAINLEVEL_OUTOF10: 3
PAINLEVEL_OUTOF10: 6
PAINLEVEL_OUTOF10: 9
PAINLEVEL_OUTOF10: 4
PAINLEVEL_OUTOF10: 5
PAINLEVEL_OUTOF10: 8
PAINLEVEL_OUTOF10: 8

## 2024-05-17 ASSESSMENT — PAIN DESCRIPTION - LOCATION
LOCATION: BACK
LOCATION: GENERALIZED

## 2024-05-17 ASSESSMENT — PAIN DESCRIPTION - DESCRIPTORS
DESCRIPTORS: ACHING
DESCRIPTORS: SHOOTING

## 2024-05-17 ASSESSMENT — PAIN DESCRIPTION - ORIENTATION: ORIENTATION: LOWER;MID

## 2024-05-17 NOTE — CARE COORDINATION
Need walk test prior to d/c for possible home O2 need. Plan for home with [facility]. S/P bronch. Transferred to C4 from C5. Following for D/C planning. Had a traumatic experience with bronch and was transferred to C4.

## 2024-05-18 LAB
A1AT PHENOTYP SERPL-IMP: NORMAL
A1AT SERPL-MCNC: 120 MG/DL (ref 90–200)
ANION GAP SERPL CALCULATED.3IONS-SCNC: 14 MMOL/L (ref 3–16)
BACTERIA SPEC RESP CULT: ABNORMAL
BACTERIA SPEC RESP CULT: ABNORMAL
BASOPHILS # BLD: 0 K/UL (ref 0–0.2)
BASOPHILS NFR BLD: 0 %
BUN SERPL-MCNC: 24 MG/DL (ref 7–20)
CALCIUM SERPL-MCNC: 9.2 MG/DL (ref 8.3–10.6)
CHLORIDE SERPL-SCNC: 97 MMOL/L (ref 99–110)
CO2 SERPL-SCNC: 21 MMOL/L (ref 21–32)
CREAT SERPL-MCNC: <0.5 MG/DL (ref 0.6–1.2)
DEPRECATED RDW RBC AUTO: 15 % (ref 12.4–15.4)
EOSINOPHIL # BLD: 0 K/UL (ref 0–0.6)
EOSINOPHIL NFR BLD: 0 %
GFR SERPLBLD CREATININE-BSD FMLA CKD-EPI: >90 ML/MIN/{1.73_M2}
GLUCOSE SERPL-MCNC: 111 MG/DL (ref 70–99)
GRAM STN SPEC: ABNORMAL
HCT VFR BLD AUTO: 45.1 % (ref 36–48)
HGB BLD-MCNC: 14.8 G/DL (ref 12–16)
LYMPHOCYTES # BLD: 2.8 K/UL (ref 1–5.1)
LYMPHOCYTES NFR BLD: 17 %
MACROCYTES BLD QL SMEAR: ABNORMAL
MCH RBC QN AUTO: 29.2 PG (ref 26–34)
MCHC RBC AUTO-ENTMCNC: 32.8 G/DL (ref 31–36)
MCV RBC AUTO: 89.2 FL (ref 80–100)
MONOCYTES # BLD: 1.2 K/UL (ref 0–1.3)
MONOCYTES NFR BLD: 7 %
NEUTROPHILS # BLD: 12.5 K/UL (ref 1.7–7.7)
NEUTROPHILS NFR BLD: 75 %
NEUTS BAND NFR BLD MANUAL: 1 % (ref 0–7)
ORGANISM: ABNORMAL
OVALOCYTES BLD QL SMEAR: ABNORMAL
PLATELET # BLD AUTO: 330 K/UL (ref 135–450)
PLATELET BLD QL SMEAR: ADEQUATE
PMV BLD AUTO: 7.4 FL (ref 5–10.5)
POTASSIUM SERPL-SCNC: 4 MMOL/L (ref 3.5–5.1)
RBC # BLD AUTO: 5.05 M/UL (ref 4–5.2)
SLIDE REVIEW: ABNORMAL
SODIUM SERPL-SCNC: 132 MMOL/L (ref 136–145)
WBC # BLD AUTO: 16.5 K/UL (ref 4–11)

## 2024-05-18 PROCEDURE — 6370000000 HC RX 637 (ALT 250 FOR IP): Performed by: NURSE PRACTITIONER

## 2024-05-18 PROCEDURE — C9113 INJ PANTOPRAZOLE SODIUM, VIA: HCPCS | Performed by: INTERNAL MEDICINE

## 2024-05-18 PROCEDURE — 6370000000 HC RX 637 (ALT 250 FOR IP): Performed by: STUDENT IN AN ORGANIZED HEALTH CARE EDUCATION/TRAINING PROGRAM

## 2024-05-18 PROCEDURE — 6360000002 HC RX W HCPCS: Performed by: INTERNAL MEDICINE

## 2024-05-18 PROCEDURE — 80048 BASIC METABOLIC PNL TOTAL CA: CPT

## 2024-05-18 PROCEDURE — 6370000000 HC RX 637 (ALT 250 FOR IP): Performed by: INTERNAL MEDICINE

## 2024-05-18 PROCEDURE — 2060000000 HC ICU INTERMEDIATE R&B

## 2024-05-18 PROCEDURE — 94640 AIRWAY INHALATION TREATMENT: CPT

## 2024-05-18 PROCEDURE — 2700000000 HC OXYGEN THERAPY PER DAY

## 2024-05-18 PROCEDURE — 85025 COMPLETE CBC W/AUTO DIFF WBC: CPT

## 2024-05-18 PROCEDURE — 94669 MECHANICAL CHEST WALL OSCILL: CPT

## 2024-05-18 PROCEDURE — 94761 N-INVAS EAR/PLS OXIMETRY MLT: CPT

## 2024-05-18 PROCEDURE — 2580000003 HC RX 258: Performed by: INTERNAL MEDICINE

## 2024-05-18 PROCEDURE — 36415 COLL VENOUS BLD VENIPUNCTURE: CPT

## 2024-05-18 PROCEDURE — 99223 1ST HOSP IP/OBS HIGH 75: CPT | Performed by: INTERNAL MEDICINE

## 2024-05-18 RX ORDER — POLYETHYLENE GLYCOL 3350 17 G/17G
17 POWDER, FOR SOLUTION ORAL 2 TIMES DAILY
Status: DISCONTINUED | OUTPATIENT
Start: 2024-05-18 | End: 2024-05-21 | Stop reason: HOSPADM

## 2024-05-18 RX ORDER — SENNOSIDES A AND B 8.6 MG/1
1 TABLET, FILM COATED ORAL 2 TIMES DAILY
Status: DISCONTINUED | OUTPATIENT
Start: 2024-05-18 | End: 2024-05-18

## 2024-05-18 RX ORDER — SENNA AND DOCUSATE SODIUM 50; 8.6 MG/1; MG/1
2 TABLET, FILM COATED ORAL DAILY
Status: DISCONTINUED | OUTPATIENT
Start: 2024-05-18 | End: 2024-05-21 | Stop reason: HOSPADM

## 2024-05-18 RX ORDER — PREDNISONE 20 MG/1
40 TABLET ORAL DAILY
Status: DISCONTINUED | OUTPATIENT
Start: 2024-05-19 | End: 2024-05-21 | Stop reason: HOSPADM

## 2024-05-18 RX ORDER — VORICONAZOLE 200 MG/1
200 TABLET, FILM COATED ORAL EVERY 12 HOURS SCHEDULED
Status: DISCONTINUED | OUTPATIENT
Start: 2024-05-18 | End: 2024-05-21 | Stop reason: HOSPADM

## 2024-05-18 RX ADMIN — ALBUTEROL SULFATE 2.5 MG: 2.5 SOLUTION RESPIRATORY (INHALATION) at 20:13

## 2024-05-18 RX ADMIN — HYDROCODONE BITARTRATE AND ACETAMINOPHEN 1 TABLET: 5; 325 TABLET ORAL at 19:59

## 2024-05-18 RX ADMIN — ONDANSETRON 4 MG: 2 INJECTION INTRAMUSCULAR; INTRAVENOUS at 20:17

## 2024-05-18 RX ADMIN — LORAZEPAM 0.5 MG: 0.5 TABLET ORAL at 19:59

## 2024-05-18 RX ADMIN — MAGNESIUM HYDROXIDE 30 ML: 400 SUSPENSION ORAL at 04:42

## 2024-05-18 RX ADMIN — Medication 10 ML: at 09:49

## 2024-05-18 RX ADMIN — GABAPENTIN 600 MG: 300 CAPSULE ORAL at 19:59

## 2024-05-18 RX ADMIN — VORICONAZOLE 200 MG: 200 TABLET ORAL at 21:03

## 2024-05-18 RX ADMIN — ALBUTEROL SULFATE 2.5 MG: 2.5 SOLUTION RESPIRATORY (INHALATION) at 09:03

## 2024-05-18 RX ADMIN — POLYETHYLENE GLYCOL 3350 17 G: 17 POWDER, FOR SOLUTION ORAL at 13:29

## 2024-05-18 RX ADMIN — ENOXAPARIN SODIUM 40 MG: 100 INJECTION SUBCUTANEOUS at 09:42

## 2024-05-18 RX ADMIN — CYANOCOBALAMIN TAB 500 MCG 250 MCG: 500 TAB at 09:45

## 2024-05-18 RX ADMIN — TIOTROPIUM BROMIDE INHALATION SPRAY 2 PUFF: 3.12 SPRAY, METERED RESPIRATORY (INHALATION) at 09:04

## 2024-05-18 RX ADMIN — TIZANIDINE 4 MG: 4 TABLET ORAL at 09:44

## 2024-05-18 RX ADMIN — TRAMADOL HYDROCHLORIDE 50 MG: 50 TABLET ORAL at 16:21

## 2024-05-18 RX ADMIN — GUAIFENESIN 600 MG: 600 TABLET ORAL at 09:44

## 2024-05-18 RX ADMIN — GABAPENTIN 600 MG: 300 CAPSULE ORAL at 09:43

## 2024-05-18 RX ADMIN — ONDANSETRON 4 MG: 4 TABLET, ORALLY DISINTEGRATING ORAL at 16:21

## 2024-05-18 RX ADMIN — PANTOPRAZOLE SODIUM 40 MG: 40 INJECTION, POWDER, FOR SOLUTION INTRAVENOUS at 09:45

## 2024-05-18 RX ADMIN — LORAZEPAM 0.5 MG: 0.5 TABLET ORAL at 13:29

## 2024-05-18 RX ADMIN — HYDROCODONE BITARTRATE AND ACETAMINOPHEN 1 TABLET: 5; 325 TABLET ORAL at 13:29

## 2024-05-18 RX ADMIN — Medication 10 ML: at 19:59

## 2024-05-18 RX ADMIN — LORAZEPAM 0.5 MG: 0.5 TABLET ORAL at 09:44

## 2024-05-18 RX ADMIN — TIZANIDINE 4 MG: 4 TABLET ORAL at 23:35

## 2024-05-18 RX ADMIN — BENZONATATE 200 MG: 100 CAPSULE ORAL at 21:08

## 2024-05-18 RX ADMIN — WATER 40 MG: 1 INJECTION INTRAMUSCULAR; INTRAVENOUS; SUBCUTANEOUS at 09:46

## 2024-05-18 RX ADMIN — GABAPENTIN 600 MG: 300 CAPSULE ORAL at 13:28

## 2024-05-18 RX ADMIN — HYDROCODONE BITARTRATE AND ACETAMINOPHEN 1 TABLET: 5; 325 TABLET ORAL at 04:22

## 2024-05-18 RX ADMIN — TRAMADOL HYDROCHLORIDE 50 MG: 50 TABLET ORAL at 23:35

## 2024-05-18 RX ADMIN — Medication 2 PUFF: at 20:13

## 2024-05-18 RX ADMIN — SENNOSIDES 8.6 MG: 8.6 TABLET, FILM COATED ORAL at 09:44

## 2024-05-18 RX ADMIN — ONDANSETRON 4 MG: 2 INJECTION INTRAMUSCULAR; INTRAVENOUS at 04:21

## 2024-05-18 RX ADMIN — ATORVASTATIN CALCIUM 20 MG: 10 TABLET, FILM COATED ORAL at 09:45

## 2024-05-18 RX ADMIN — ACETYLCYSTEINE 600 MG: 200 INHALANT RESPIRATORY (INHALATION) at 20:13

## 2024-05-18 RX ADMIN — PIPERACILLIN AND TAZOBACTAM 4500 MG: 4; .5 INJECTION, POWDER, FOR SOLUTION INTRAVENOUS at 20:21

## 2024-05-18 RX ADMIN — HYDROCODONE BITARTRATE AND ACETAMINOPHEN 1 TABLET: 5; 325 TABLET ORAL at 09:44

## 2024-05-18 RX ADMIN — SENNOSIDES AND DOCUSATE SODIUM 2 TABLET: 50; 8.6 TABLET ORAL at 16:21

## 2024-05-18 RX ADMIN — Medication 2 PUFF: at 09:03

## 2024-05-18 RX ADMIN — MAGNESIUM HYDROXIDE 30 ML: 400 SUSPENSION ORAL at 16:20

## 2024-05-18 RX ADMIN — Medication 1000 UNITS: at 09:44

## 2024-05-18 RX ADMIN — TIZANIDINE 4 MG: 4 TABLET ORAL at 16:42

## 2024-05-18 RX ADMIN — MONTELUKAST SODIUM 10 MG: 10 TABLET, COATED ORAL at 19:59

## 2024-05-18 RX ADMIN — GUAIFENESIN 600 MG: 600 TABLET ORAL at 19:59

## 2024-05-18 RX ADMIN — TRAZODONE HYDROCHLORIDE 100 MG: 50 TABLET ORAL at 23:38

## 2024-05-18 ASSESSMENT — PAIN DESCRIPTION - LOCATION
LOCATION: BACK

## 2024-05-18 ASSESSMENT — PAIN DESCRIPTION - PAIN TYPE
TYPE: CHRONIC PAIN

## 2024-05-18 ASSESSMENT — PAIN SCALES - GENERAL
PAINLEVEL_OUTOF10: 3
PAINLEVEL_OUTOF10: 7
PAINLEVEL_OUTOF10: 0
PAINLEVEL_OUTOF10: 8
PAINLEVEL_OUTOF10: 3
PAINLEVEL_OUTOF10: 3
PAINLEVEL_OUTOF10: 10
PAINLEVEL_OUTOF10: 8
PAINLEVEL_OUTOF10: 1
PAINLEVEL_OUTOF10: 5
PAINLEVEL_OUTOF10: 3
PAINLEVEL_OUTOF10: 1
PAINLEVEL_OUTOF10: 2
PAINLEVEL_OUTOF10: 2

## 2024-05-18 ASSESSMENT — PAIN SCALES - WONG BAKER: WONGBAKER_NUMERICALRESPONSE: NO HURT

## 2024-05-18 ASSESSMENT — PAIN DESCRIPTION - ORIENTATION
ORIENTATION: LOWER;MID
ORIENTATION: MID;LOWER

## 2024-05-18 ASSESSMENT — PAIN DESCRIPTION - DESCRIPTORS
DESCRIPTORS: ACHING
DESCRIPTORS: ACHING;DISCOMFORT

## 2024-05-18 ASSESSMENT — PAIN DESCRIPTION - FREQUENCY
FREQUENCY: CONTINUOUS
FREQUENCY: CONTINUOUS

## 2024-05-18 ASSESSMENT — PAIN - FUNCTIONAL ASSESSMENT
PAIN_FUNCTIONAL_ASSESSMENT: ACTIVITIES ARE NOT PREVENTED

## 2024-05-18 ASSESSMENT — PAIN DESCRIPTION - ONSET
ONSET: ON-GOING
ONSET: ON-GOING

## 2024-05-19 PROBLEM — D72.9 NEUTROPHILIA: Status: ACTIVE | Noted: 2024-05-19

## 2024-05-19 PROBLEM — J96.02 ACUTE RESPIRATORY FAILURE WITH HYPOXIA AND HYPERCAPNIA (HCC): Status: ACTIVE | Noted: 2024-05-19

## 2024-05-19 PROBLEM — J96.01 ACUTE RESPIRATORY FAILURE WITH HYPOXIA AND HYPERCAPNIA (HCC): Status: ACTIVE | Noted: 2024-05-19

## 2024-05-19 PROBLEM — B44.89 ASPERGILLUS FUMIGATUS (HCC): Status: ACTIVE | Noted: 2024-05-19

## 2024-05-19 PROBLEM — D72.828 NEUTROPHILIA: Status: ACTIVE | Noted: 2024-05-19

## 2024-05-19 PROBLEM — R06.03 ACUTE RESPIRATORY DISTRESS: Status: ACTIVE | Noted: 2024-05-19

## 2024-05-19 PROBLEM — R93.89 ABNORMAL CT OF THE CHEST: Status: ACTIVE | Noted: 2024-05-19

## 2024-05-19 PROBLEM — J18.9 BASAL PNEUMONIA OF BOTH LUNGS: Status: ACTIVE | Noted: 2024-05-19

## 2024-05-19 PROBLEM — E87.20 LACTIC ACIDOSIS: Status: ACTIVE | Noted: 2024-05-19

## 2024-05-19 LAB
ANION GAP SERPL CALCULATED.3IONS-SCNC: 10 MMOL/L (ref 3–16)
BASOPHILS # BLD: 0 K/UL (ref 0–0.2)
BASOPHILS NFR BLD: 0 %
BUN SERPL-MCNC: 22 MG/DL (ref 7–20)
CALCIUM SERPL-MCNC: 8.7 MG/DL (ref 8.3–10.6)
CHLORIDE SERPL-SCNC: 98 MMOL/L (ref 99–110)
CO2 SERPL-SCNC: 27 MMOL/L (ref 21–32)
CREAT SERPL-MCNC: 0.6 MG/DL (ref 0.6–1.2)
CRP SERPL-MCNC: <3 MG/L (ref 0–5.1)
DEPRECATED RDW RBC AUTO: 15.1 % (ref 12.4–15.4)
EOSINOPHIL # BLD: 0 K/UL (ref 0–0.6)
EOSINOPHIL NFR BLD: 0 %
GFR SERPLBLD CREATININE-BSD FMLA CKD-EPI: >90 ML/MIN/{1.73_M2}
GLUCOSE SERPL-MCNC: 136 MG/DL (ref 70–99)
HCT VFR BLD AUTO: 39.4 % (ref 36–48)
HGB BLD-MCNC: 12.9 G/DL (ref 12–16)
LYMPHOCYTES # BLD: 5.1 K/UL (ref 1–5.1)
LYMPHOCYTES NFR BLD: 27 %
MCH RBC QN AUTO: 29 PG (ref 26–34)
MCHC RBC AUTO-ENTMCNC: 32.9 G/DL (ref 31–36)
MCV RBC AUTO: 88.3 FL (ref 80–100)
METAMYELOCYTES NFR BLD MANUAL: 2 %
MONOCYTES # BLD: 0.8 K/UL (ref 0–1.3)
MONOCYTES NFR BLD: 4 %
MYELOCYTES NFR BLD MANUAL: 1 %
NEUTROPHILS # BLD: 13 K/UL (ref 1.7–7.7)
NEUTROPHILS NFR BLD: 62 %
NEUTS BAND NFR BLD MANUAL: 4 % (ref 0–7)
PATH INTERP BLD-IMP: YES
PLATELET # BLD AUTO: 300 K/UL (ref 135–450)
PLATELET BLD QL SMEAR: ADEQUATE
PMV BLD AUTO: 7.4 FL (ref 5–10.5)
POTASSIUM SERPL-SCNC: 3.8 MMOL/L (ref 3.5–5.1)
PROCALCITONIN SERPL IA-MCNC: 0.08 NG/ML (ref 0–0.15)
RBC # BLD AUTO: 4.46 M/UL (ref 4–5.2)
RBC MORPH BLD: NORMAL
SLIDE REVIEW: ABNORMAL
SODIUM SERPL-SCNC: 135 MMOL/L (ref 136–145)
WBC # BLD AUTO: 18.8 K/UL (ref 4–11)

## 2024-05-19 PROCEDURE — 85025 COMPLETE CBC W/AUTO DIFF WBC: CPT

## 2024-05-19 PROCEDURE — 6370000000 HC RX 637 (ALT 250 FOR IP): Performed by: FAMILY MEDICINE

## 2024-05-19 PROCEDURE — 6370000000 HC RX 637 (ALT 250 FOR IP): Performed by: STUDENT IN AN ORGANIZED HEALTH CARE EDUCATION/TRAINING PROGRAM

## 2024-05-19 PROCEDURE — 36415 COLL VENOUS BLD VENIPUNCTURE: CPT

## 2024-05-19 PROCEDURE — 6370000000 HC RX 637 (ALT 250 FOR IP): Performed by: INTERNAL MEDICINE

## 2024-05-19 PROCEDURE — 94761 N-INVAS EAR/PLS OXIMETRY MLT: CPT

## 2024-05-19 PROCEDURE — 94640 AIRWAY INHALATION TREATMENT: CPT

## 2024-05-19 PROCEDURE — 99221 1ST HOSP IP/OBS SF/LOW 40: CPT | Performed by: FAMILY MEDICINE

## 2024-05-19 PROCEDURE — 94669 MECHANICAL CHEST WALL OSCILL: CPT

## 2024-05-19 PROCEDURE — 2580000003 HC RX 258: Performed by: INTERNAL MEDICINE

## 2024-05-19 PROCEDURE — 2700000000 HC OXYGEN THERAPY PER DAY

## 2024-05-19 PROCEDURE — 6360000002 HC RX W HCPCS: Performed by: INTERNAL MEDICINE

## 2024-05-19 PROCEDURE — 86698 HISTOPLASMA ANTIBODY: CPT

## 2024-05-19 PROCEDURE — C9113 INJ PANTOPRAZOLE SODIUM, VIA: HCPCS | Performed by: INTERNAL MEDICINE

## 2024-05-19 PROCEDURE — 80048 BASIC METABOLIC PNL TOTAL CA: CPT

## 2024-05-19 PROCEDURE — 6370000000 HC RX 637 (ALT 250 FOR IP): Performed by: NURSE PRACTITIONER

## 2024-05-19 PROCEDURE — 86612 BLASTOMYCES ANTIBODY: CPT

## 2024-05-19 PROCEDURE — 2060000000 HC ICU INTERMEDIATE R&B

## 2024-05-19 PROCEDURE — 86140 C-REACTIVE PROTEIN: CPT

## 2024-05-19 PROCEDURE — 84145 PROCALCITONIN (PCT): CPT

## 2024-05-19 PROCEDURE — 86606 ASPERGILLUS ANTIBODY: CPT

## 2024-05-19 PROCEDURE — 86635 COCCIDIOIDES ANTIBODY: CPT

## 2024-05-19 PROCEDURE — 87449 NOS EACH ORGANISM AG IA: CPT

## 2024-05-19 RX ORDER — LORAZEPAM 0.5 MG/1
0.5 TABLET ORAL EVERY 6 HOURS PRN
Status: DISCONTINUED | OUTPATIENT
Start: 2024-05-19 | End: 2024-05-21 | Stop reason: HOSPADM

## 2024-05-19 RX ORDER — ALBUTEROL SULFATE 2.5 MG/3ML
2.5 SOLUTION RESPIRATORY (INHALATION)
Status: DISCONTINUED | OUTPATIENT
Start: 2024-05-20 | End: 2024-05-21 | Stop reason: HOSPADM

## 2024-05-19 RX ORDER — HYDROXYZINE HYDROCHLORIDE 25 MG/1
50 TABLET, FILM COATED ORAL 3 TIMES DAILY PRN
Status: DISCONTINUED | OUTPATIENT
Start: 2024-05-19 | End: 2024-05-21 | Stop reason: HOSPADM

## 2024-05-19 RX ADMIN — LORAZEPAM 0.5 MG: 0.5 TABLET ORAL at 06:34

## 2024-05-19 RX ADMIN — ENOXAPARIN SODIUM 40 MG: 100 INJECTION SUBCUTANEOUS at 09:00

## 2024-05-19 RX ADMIN — LORAZEPAM 0.5 MG: 0.5 TABLET ORAL at 01:10

## 2024-05-19 RX ADMIN — TRAMADOL HYDROCHLORIDE 50 MG: 50 TABLET ORAL at 08:37

## 2024-05-19 RX ADMIN — GABAPENTIN 600 MG: 300 CAPSULE ORAL at 14:14

## 2024-05-19 RX ADMIN — GUAIFENESIN 600 MG: 600 TABLET ORAL at 20:38

## 2024-05-19 RX ADMIN — ACETYLCYSTEINE 600 MG: 200 INHALANT RESPIRATORY (INHALATION) at 19:16

## 2024-05-19 RX ADMIN — TIZANIDINE 4 MG: 4 TABLET ORAL at 08:38

## 2024-05-19 RX ADMIN — VORICONAZOLE 200 MG: 200 TABLET ORAL at 08:51

## 2024-05-19 RX ADMIN — PIPERACILLIN AND TAZOBACTAM 3375 MG: 3; .375 INJECTION, POWDER, LYOPHILIZED, FOR SOLUTION INTRAVENOUS at 02:20

## 2024-05-19 RX ADMIN — PANTOPRAZOLE SODIUM 40 MG: 40 INJECTION, POWDER, FOR SOLUTION INTRAVENOUS at 08:39

## 2024-05-19 RX ADMIN — CYANOCOBALAMIN TAB 500 MCG 250 MCG: 500 TAB at 08:37

## 2024-05-19 RX ADMIN — Medication 1000 UNITS: at 08:38

## 2024-05-19 RX ADMIN — Medication 10 ML: at 08:41

## 2024-05-19 RX ADMIN — SENNOSIDES AND DOCUSATE SODIUM 2 TABLET: 50; 8.6 TABLET ORAL at 08:36

## 2024-05-19 RX ADMIN — PIPERACILLIN AND TAZOBACTAM 3375 MG: 3; .375 INJECTION, POWDER, LYOPHILIZED, FOR SOLUTION INTRAVENOUS at 18:55

## 2024-05-19 RX ADMIN — GABAPENTIN 600 MG: 300 CAPSULE ORAL at 08:37

## 2024-05-19 RX ADMIN — ONDANSETRON 4 MG: 2 INJECTION INTRAMUSCULAR; INTRAVENOUS at 20:44

## 2024-05-19 RX ADMIN — HYDROCODONE BITARTRATE AND ACETAMINOPHEN 1 TABLET: 5; 325 TABLET ORAL at 01:08

## 2024-05-19 RX ADMIN — Medication 2 PUFF: at 19:22

## 2024-05-19 RX ADMIN — HYDROXYZINE HYDROCHLORIDE 50 MG: 25 TABLET, FILM COATED ORAL at 20:41

## 2024-05-19 RX ADMIN — PIPERACILLIN AND TAZOBACTAM 3375 MG: 3; .375 INJECTION, POWDER, LYOPHILIZED, FOR SOLUTION INTRAVENOUS at 09:04

## 2024-05-19 RX ADMIN — ENOXAPARIN SODIUM 40 MG: 100 INJECTION SUBCUTANEOUS at 08:36

## 2024-05-19 RX ADMIN — ACETYLCYSTEINE 600 MG: 200 INHALANT RESPIRATORY (INHALATION) at 07:44

## 2024-05-19 RX ADMIN — ALBUTEROL SULFATE 2.5 MG: 2.5 SOLUTION RESPIRATORY (INHALATION) at 07:44

## 2024-05-19 RX ADMIN — MONTELUKAST SODIUM 10 MG: 10 TABLET, COATED ORAL at 20:37

## 2024-05-19 RX ADMIN — VORICONAZOLE 200 MG: 200 TABLET ORAL at 20:41

## 2024-05-19 RX ADMIN — ATORVASTATIN CALCIUM 20 MG: 10 TABLET, FILM COATED ORAL at 08:38

## 2024-05-19 RX ADMIN — PREDNISONE 40 MG: 20 TABLET ORAL at 08:40

## 2024-05-19 RX ADMIN — GABAPENTIN 600 MG: 300 CAPSULE ORAL at 20:38

## 2024-05-19 RX ADMIN — TIOTROPIUM BROMIDE INHALATION SPRAY 2 PUFF: 3.12 SPRAY, METERED RESPIRATORY (INHALATION) at 07:44

## 2024-05-19 RX ADMIN — Medication 2 PUFF: at 07:44

## 2024-05-19 RX ADMIN — ALBUTEROL SULFATE 2.5 MG: 2.5 SOLUTION RESPIRATORY (INHALATION) at 19:16

## 2024-05-19 RX ADMIN — HYDROCODONE BITARTRATE AND ACETAMINOPHEN 1 TABLET: 5; 325 TABLET ORAL at 06:34

## 2024-05-19 RX ADMIN — HYDROCODONE BITARTRATE AND ACETAMINOPHEN 1 TABLET: 5; 325 TABLET ORAL at 20:37

## 2024-05-19 RX ADMIN — ONDANSETRON 4 MG: 2 INJECTION INTRAMUSCULAR; INTRAVENOUS at 02:17

## 2024-05-19 RX ADMIN — LORAZEPAM 0.5 MG: 0.5 TABLET ORAL at 14:14

## 2024-05-19 RX ADMIN — HYDROCODONE BITARTRATE AND ACETAMINOPHEN 1 TABLET: 5; 325 TABLET ORAL at 14:14

## 2024-05-19 RX ADMIN — GUAIFENESIN 600 MG: 600 TABLET ORAL at 08:38

## 2024-05-19 ASSESSMENT — PAIN DESCRIPTION - DESCRIPTORS
DESCRIPTORS: CRAMPING
DESCRIPTORS: CRAMPING
DESCRIPTORS: ACHING
DESCRIPTORS: CRAMPING

## 2024-05-19 ASSESSMENT — PAIN DESCRIPTION - ORIENTATION: ORIENTATION: OTHER (COMMENT)

## 2024-05-19 ASSESSMENT — PAIN SCALES - WONG BAKER: WONGBAKER_NUMERICALRESPONSE: NO HURT

## 2024-05-19 ASSESSMENT — PAIN DESCRIPTION - PAIN TYPE
TYPE: CHRONIC PAIN
TYPE: ACUTE PAIN
TYPE: CHRONIC PAIN
TYPE: CHRONIC PAIN
TYPE: ACUTE PAIN
TYPE: CHRONIC PAIN

## 2024-05-19 ASSESSMENT — PAIN DESCRIPTION - ONSET: ONSET: ON-GOING

## 2024-05-19 ASSESSMENT — PAIN SCALES - GENERAL
PAINLEVEL_OUTOF10: 5
PAINLEVEL_OUTOF10: 8
PAINLEVEL_OUTOF10: 3
PAINLEVEL_OUTOF10: 5
PAINLEVEL_OUTOF10: 2
PAINLEVEL_OUTOF10: 3
PAINLEVEL_OUTOF10: 6
PAINLEVEL_OUTOF10: 3
PAINLEVEL_OUTOF10: 3
PAINLEVEL_OUTOF10: 7
PAINLEVEL_OUTOF10: 9

## 2024-05-19 ASSESSMENT — PAIN - FUNCTIONAL ASSESSMENT
PAIN_FUNCTIONAL_ASSESSMENT: ACTIVITIES ARE NOT PREVENTED

## 2024-05-19 ASSESSMENT — PAIN DESCRIPTION - LOCATION
LOCATION: ABDOMEN
LOCATION: BACK
LOCATION: ABDOMEN
LOCATION: BACK

## 2024-05-19 ASSESSMENT — PAIN DESCRIPTION - FREQUENCY: FREQUENCY: CONTINUOUS
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
## 2024-05-19 NOTE — DISCHARGE INSTRUCTIONS
Therapy Resources:  [facility]  Location: Multiple offices in the [facility]  Phone: [phone]    [facility]  Location: Multiple offices in the [facility]  Phone: [phone]     Psychology at [place] (may have a long wait)                Location: [address]  Phone:  [phone]    [facility]: [email or web address]  Location: [address] [place] [address]  Phone: [phone]  -[employer] insurance or self-pay sliding scale    [facility]: [email or web address]  Phone: [phone]    [facility]: [email or web address]  Location: [address] [facility] [address] [address]  Phone: [phone]    [facility]  Location: [address] [place] [address]  Phone: [phone]    [facility]  Location: [address] [place] [address]  Phone: [phone]    [facility]  Location: [address] [facility] [address], [place] [address]  Phone: [phone]    [facility]: [email or web address]  Location: [address] [address]  Phone: [phone] -takes insurance or self/cash pay with income-based payment options    If you are having thoughts about harming yourself or others or if you are feeling in crisis and need to talk with someone who can help, please utilize the following resources:  -[place] Crisis Hotline: 196-083-CARE  -National crisis hotlines: 988, 3-382-MRSDJQX, 1-439-851-TALK, 915  -[facility] (LGBTQ crisis hotline): call 1-883.126.8487 or text START to 935-048, can also chat on their website  - Mobile Crisis Team: call [phone] or [phone] and a social work representative will discuss next steps to get help. This team may be able to come to your location to evaluate a mental health emergency if needed.  - [facility] (\"[facility]\", [phone]) is located at [address]

## 2024-05-19 NOTE — FLOWSHEET NOTE
Patient alert and oriented. Appears comfortable and states she feels she is doing better. She states her breathing seems to have gotten better and even more so since yesterday.  Patient was assisted with tele Doc psych visit.

## 2024-05-20 LAB
ANION GAP SERPL CALCULATED.3IONS-SCNC: 8 MMOL/L (ref 3–16)
BASOPHILS # BLD: 0 K/UL (ref 0–0.2)
BASOPHILS NFR BLD: 0 %
BUN SERPL-MCNC: 19 MG/DL (ref 7–20)
BURR CELLS BLD QL SMEAR: ABNORMAL
CALCIUM SERPL-MCNC: 8.6 MG/DL (ref 8.3–10.6)
CHLORIDE SERPL-SCNC: 100 MMOL/L (ref 99–110)
CO2 SERPL-SCNC: 27 MMOL/L (ref 21–32)
CREAT SERPL-MCNC: 0.7 MG/DL (ref 0.6–1.2)
DEPRECATED RDW RBC AUTO: 15.2 % (ref 12.4–15.4)
EOSINOPHIL # BLD: 0 K/UL (ref 0–0.6)
EOSINOPHIL NFR BLD: 0 %
GFR SERPLBLD CREATININE-BSD FMLA CKD-EPI: >90 ML/MIN/{1.73_M2}
GLUCOSE SERPL-MCNC: 130 MG/DL (ref 70–99)
HCT VFR BLD AUTO: 34.6 % (ref 36–48)
HGB BLD-MCNC: 11.5 G/DL (ref 12–16)
LEGIONELLA AG UR QL: NORMAL
LYMPHOCYTES # BLD: 1.7 K/UL (ref 1–5.1)
LYMPHOCYTES NFR BLD: 9 %
MCH RBC QN AUTO: 29.6 PG (ref 26–34)
MCHC RBC AUTO-ENTMCNC: 33.3 G/DL (ref 31–36)
MCV RBC AUTO: 88.8 FL (ref 80–100)
MONOCYTES # BLD: 0.6 K/UL (ref 0–1.3)
MONOCYTES NFR BLD: 4 %
NEUTROPHILS # BLD: 12.8 K/UL (ref 1.7–7.7)
NEUTROPHILS NFR BLD: 75 %
NEUTS BAND NFR BLD MANUAL: 10 % (ref 0–7)
PATH INTERP BLD-IMP: NO
PATH INTERP BLD-IMP: NORMAL
PLATELET # BLD AUTO: 262 K/UL (ref 135–450)
PMV BLD AUTO: 7.3 FL (ref 5–10.5)
POTASSIUM SERPL-SCNC: 4.8 MMOL/L (ref 3.5–5.1)
RBC # BLD AUTO: 3.9 M/UL (ref 4–5.2)
S PNEUM AG UR QL: NORMAL
SODIUM SERPL-SCNC: 135 MMOL/L (ref 136–145)
VARIANT LYMPHS NFR BLD MANUAL: 2 % (ref 0–6)
WBC # BLD AUTO: 15 K/UL (ref 4–11)

## 2024-05-20 PROCEDURE — 94640 AIRWAY INHALATION TREATMENT: CPT

## 2024-05-20 PROCEDURE — 6360000002 HC RX W HCPCS: Performed by: STUDENT IN AN ORGANIZED HEALTH CARE EDUCATION/TRAINING PROGRAM

## 2024-05-20 PROCEDURE — 6370000000 HC RX 637 (ALT 250 FOR IP): Performed by: FAMILY MEDICINE

## 2024-05-20 PROCEDURE — 99232 SBSQ HOSP IP/OBS MODERATE 35: CPT | Performed by: INTERNAL MEDICINE

## 2024-05-20 PROCEDURE — 2060000000 HC ICU INTERMEDIATE R&B

## 2024-05-20 PROCEDURE — 94669 MECHANICAL CHEST WALL OSCILL: CPT

## 2024-05-20 PROCEDURE — 94761 N-INVAS EAR/PLS OXIMETRY MLT: CPT

## 2024-05-20 PROCEDURE — 6370000000 HC RX 637 (ALT 250 FOR IP): Performed by: STUDENT IN AN ORGANIZED HEALTH CARE EDUCATION/TRAINING PROGRAM

## 2024-05-20 PROCEDURE — 6370000000 HC RX 637 (ALT 250 FOR IP): Performed by: INTERNAL MEDICINE

## 2024-05-20 PROCEDURE — 6360000002 HC RX W HCPCS: Performed by: INTERNAL MEDICINE

## 2024-05-20 PROCEDURE — 2580000003 HC RX 258: Performed by: INTERNAL MEDICINE

## 2024-05-20 PROCEDURE — 2700000000 HC OXYGEN THERAPY PER DAY

## 2024-05-20 PROCEDURE — 80048 BASIC METABOLIC PNL TOTAL CA: CPT

## 2024-05-20 PROCEDURE — C9113 INJ PANTOPRAZOLE SODIUM, VIA: HCPCS | Performed by: INTERNAL MEDICINE

## 2024-05-20 PROCEDURE — 6370000000 HC RX 637 (ALT 250 FOR IP): Performed by: NURSE PRACTITIONER

## 2024-05-20 PROCEDURE — 85025 COMPLETE CBC W/AUTO DIFF WBC: CPT

## 2024-05-20 RX ADMIN — HYDROCODONE BITARTRATE AND ACETAMINOPHEN 1 TABLET: 5; 325 TABLET ORAL at 20:03

## 2024-05-20 RX ADMIN — Medication 2 PUFF: at 19:27

## 2024-05-20 RX ADMIN — PIPERACILLIN AND TAZOBACTAM 3375 MG: 3; .375 INJECTION, POWDER, LYOPHILIZED, FOR SOLUTION INTRAVENOUS at 01:57

## 2024-05-20 RX ADMIN — HYDROXYZINE HYDROCHLORIDE 50 MG: 25 TABLET, FILM COATED ORAL at 20:02

## 2024-05-20 RX ADMIN — GUAIFENESIN 600 MG: 600 TABLET ORAL at 07:56

## 2024-05-20 RX ADMIN — CYANOCOBALAMIN TAB 500 MCG 250 MCG: 500 TAB at 07:56

## 2024-05-20 RX ADMIN — ALBUTEROL SULFATE 2.5 MG: 2.5 SOLUTION RESPIRATORY (INHALATION) at 19:22

## 2024-05-20 RX ADMIN — GABAPENTIN 600 MG: 300 CAPSULE ORAL at 07:55

## 2024-05-20 RX ADMIN — ONDANSETRON 4 MG: 2 INJECTION INTRAMUSCULAR; INTRAVENOUS at 01:53

## 2024-05-20 RX ADMIN — VORICONAZOLE 200 MG: 200 TABLET ORAL at 08:03

## 2024-05-20 RX ADMIN — PREDNISONE 40 MG: 20 TABLET ORAL at 07:56

## 2024-05-20 RX ADMIN — LORAZEPAM 0.5 MG: 0.5 TABLET ORAL at 22:00

## 2024-05-20 RX ADMIN — Medication 10 ML: at 07:57

## 2024-05-20 RX ADMIN — Medication 1000 UNITS: at 07:55

## 2024-05-20 RX ADMIN — MONTELUKAST SODIUM 10 MG: 10 TABLET, COATED ORAL at 20:03

## 2024-05-20 RX ADMIN — HYDROCODONE BITARTRATE AND ACETAMINOPHEN 1 TABLET: 5; 325 TABLET ORAL at 15:55

## 2024-05-20 RX ADMIN — LORAZEPAM 0.5 MG: 0.5 TABLET ORAL at 01:53

## 2024-05-20 RX ADMIN — HYDROCODONE BITARTRATE AND ACETAMINOPHEN 1 TABLET: 5; 325 TABLET ORAL at 01:53

## 2024-05-20 RX ADMIN — ACETYLCYSTEINE 600 MG: 200 INHALANT RESPIRATORY (INHALATION) at 07:43

## 2024-05-20 RX ADMIN — PANTOPRAZOLE SODIUM 40 MG: 40 INJECTION, POWDER, FOR SOLUTION INTRAVENOUS at 07:57

## 2024-05-20 RX ADMIN — SENNOSIDES AND DOCUSATE SODIUM 2 TABLET: 50; 8.6 TABLET ORAL at 07:56

## 2024-05-20 RX ADMIN — POLYETHYLENE GLYCOL 3350 17 G: 17 POWDER, FOR SOLUTION ORAL at 07:58

## 2024-05-20 RX ADMIN — PIPERACILLIN AND TAZOBACTAM 3375 MG: 3; .375 INJECTION, POWDER, LYOPHILIZED, FOR SOLUTION INTRAVENOUS at 11:40

## 2024-05-20 RX ADMIN — Medication 2 PUFF: at 07:44

## 2024-05-20 RX ADMIN — HYDROCODONE BITARTRATE AND ACETAMINOPHEN 1 TABLET: 5; 325 TABLET ORAL at 07:55

## 2024-05-20 RX ADMIN — ALBUTEROL SULFATE 2.5 MG: 2.5 SOLUTION RESPIRATORY (INHALATION) at 07:43

## 2024-05-20 RX ADMIN — TIOTROPIUM BROMIDE INHALATION SPRAY 2 PUFF: 3.12 SPRAY, METERED RESPIRATORY (INHALATION) at 07:43

## 2024-05-20 RX ADMIN — LORAZEPAM 0.5 MG: 0.5 TABLET ORAL at 07:55

## 2024-05-20 RX ADMIN — GABAPENTIN 600 MG: 300 CAPSULE ORAL at 20:03

## 2024-05-20 RX ADMIN — TRAZODONE HYDROCHLORIDE 100 MG: 50 TABLET ORAL at 20:03

## 2024-05-20 RX ADMIN — LORAZEPAM 0.5 MG: 0.5 TABLET ORAL at 15:55

## 2024-05-20 RX ADMIN — ENOXAPARIN SODIUM 40 MG: 100 INJECTION SUBCUTANEOUS at 07:57

## 2024-05-20 RX ADMIN — GABAPENTIN 600 MG: 300 CAPSULE ORAL at 14:00

## 2024-05-20 RX ADMIN — ATORVASTATIN CALCIUM 20 MG: 10 TABLET, FILM COATED ORAL at 07:56

## 2024-05-20 RX ADMIN — TRAMADOL HYDROCHLORIDE 50 MG: 50 TABLET ORAL at 05:55

## 2024-05-20 ASSESSMENT — PAIN SCALES - GENERAL
PAINLEVEL_OUTOF10: 6
PAINLEVEL_OUTOF10: 5
PAINLEVEL_OUTOF10: 6
PAINLEVEL_OUTOF10: 9
PAINLEVEL_OUTOF10: 5
PAINLEVEL_OUTOF10: 0
PAINLEVEL_OUTOF10: 7
PAINLEVEL_OUTOF10: 2

## 2024-05-20 ASSESSMENT — PAIN DESCRIPTION - PAIN TYPE: TYPE: CHRONIC PAIN

## 2024-05-20 ASSESSMENT — PAIN SCALES - WONG BAKER

## 2024-05-20 ASSESSMENT — PAIN - FUNCTIONAL ASSESSMENT: PAIN_FUNCTIONAL_ASSESSMENT: ACTIVITIES ARE NOT PREVENTED

## 2024-05-20 ASSESSMENT — PAIN DESCRIPTION - DESCRIPTORS
DESCRIPTORS: ACHING

## 2024-05-20 ASSESSMENT — PAIN DESCRIPTION - LOCATION
LOCATION: GENERALIZED
LOCATION: KNEE
LOCATION: GENERALIZED

## 2024-05-20 ASSESSMENT — PAIN DESCRIPTION - ORIENTATION: ORIENTATION: LEFT;RIGHT
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
## 2024-05-20 NOTE — FLOWSHEET NOTE
05/19/24 2036   Vital Signs   Temp 98.5 °F (36.9 °C)   Temp Source Oral   Pulse 99   Heart Rate Source Monitor   Respirations 16   /82   MAP (Calculated) 101   BP Location Left upper arm   BP Method Automatic   Patient Position High fowlers   Pain Assessment   Pain Assessment 0-10   Pain Level 8   Pain Location Generalized   Pain Orientation Other (Comment)  (all over)   Pain Descriptors Aching   Pain Type Chronic pain   Pain Frequency Continuous   Pain Onset On-going   Oxygen Therapy   SpO2 94 %   O2 Device None (Room air)     PM assessment complete. Medications given as ordered. Prn medication for anxiety and pain per pt request. Alert and oriented, calm and cooperative. Vital signs stable. Plan of care reviewed. No other needs identified at this time.

## 2024-05-21 ENCOUNTER — TELEPHONE (OUTPATIENT)
Dept: INFECTIOUS DISEASES | Age: 69
End: 2024-05-21

## 2024-05-21 VITALS
OXYGEN SATURATION: 91 % | SYSTOLIC BLOOD PRESSURE: 133 MMHG | DIASTOLIC BLOOD PRESSURE: 79 MMHG | HEIGHT: 65 IN | TEMPERATURE: 98.7 F | HEART RATE: 98 BPM | RESPIRATION RATE: 20 BRPM | WEIGHT: 118.7 LBS | BODY MASS INDEX: 19.78 KG/M2

## 2024-05-21 LAB
ANION GAP SERPL CALCULATED.3IONS-SCNC: 7 MMOL/L (ref 3–16)
ANISOCYTOSIS BLD QL SMEAR: ABNORMAL
BASOPHILS # BLD: 0 K/UL (ref 0–0.2)
BASOPHILS NFR BLD: 0 %
BUN SERPL-MCNC: 20 MG/DL (ref 7–20)
CALCIUM SERPL-MCNC: 8.5 MG/DL (ref 8.3–10.6)
CHLORIDE SERPL-SCNC: 102 MMOL/L (ref 99–110)
CO2 SERPL-SCNC: 28 MMOL/L (ref 21–32)
CREAT SERPL-MCNC: 0.6 MG/DL (ref 0.6–1.2)
DEPRECATED RDW RBC AUTO: 15.5 % (ref 12.4–15.4)
EOSINOPHIL # BLD: 0 K/UL (ref 0–0.6)
EOSINOPHIL NFR BLD: 0 %
GFR SERPLBLD CREATININE-BSD FMLA CKD-EPI: >90 ML/MIN/{1.73_M2}
GLUCOSE SERPL-MCNC: 131 MG/DL (ref 70–99)
HCT VFR BLD AUTO: 33.6 % (ref 36–48)
HGB BLD-MCNC: 11.1 G/DL (ref 12–16)
LYMPHOCYTES # BLD: 2.9 K/UL (ref 1–5.1)
LYMPHOCYTES NFR BLD: 19 %
MCH RBC QN AUTO: 29.3 PG (ref 26–34)
MCHC RBC AUTO-ENTMCNC: 33.1 G/DL (ref 31–36)
MCV RBC AUTO: 88.5 FL (ref 80–100)
METAMYELOCYTES NFR BLD MANUAL: 3 %
MICROCYTES BLD QL SMEAR: ABNORMAL
MONOCYTES # BLD: 1.5 K/UL (ref 0–1.3)
MONOCYTES NFR BLD: 10 %
NEUTROPHILS # BLD: 10.7 K/UL (ref 1.7–7.7)
NEUTROPHILS NFR BLD: 66 %
NEUTS BAND NFR BLD MANUAL: 2 % (ref 0–7)
PATH INTERP BLD-IMP: NO
PLATELET # BLD AUTO: 260 K/UL (ref 135–450)
PLATELET BLD QL SMEAR: ADEQUATE
PMV BLD AUTO: 7.1 FL (ref 5–10.5)
POTASSIUM SERPL-SCNC: 4.6 MMOL/L (ref 3.5–5.1)
RBC # BLD AUTO: 3.8 M/UL (ref 4–5.2)
SLIDE REVIEW: ABNORMAL
SODIUM SERPL-SCNC: 137 MMOL/L (ref 136–145)
WBC # BLD AUTO: 15.1 K/UL (ref 4–11)

## 2024-05-21 PROCEDURE — 6360000002 HC RX W HCPCS: Performed by: INTERNAL MEDICINE

## 2024-05-21 PROCEDURE — 94640 AIRWAY INHALATION TREATMENT: CPT

## 2024-05-21 PROCEDURE — 36415 COLL VENOUS BLD VENIPUNCTURE: CPT

## 2024-05-21 PROCEDURE — 6370000000 HC RX 637 (ALT 250 FOR IP): Performed by: FAMILY MEDICINE

## 2024-05-21 PROCEDURE — 94669 MECHANICAL CHEST WALL OSCILL: CPT

## 2024-05-21 PROCEDURE — 80048 BASIC METABOLIC PNL TOTAL CA: CPT

## 2024-05-21 PROCEDURE — 97530 THERAPEUTIC ACTIVITIES: CPT

## 2024-05-21 PROCEDURE — 97116 GAIT TRAINING THERAPY: CPT

## 2024-05-21 PROCEDURE — 6370000000 HC RX 637 (ALT 250 FOR IP): Performed by: STUDENT IN AN ORGANIZED HEALTH CARE EDUCATION/TRAINING PROGRAM

## 2024-05-21 PROCEDURE — 97166 OT EVAL MOD COMPLEX 45 MIN: CPT

## 2024-05-21 PROCEDURE — 6360000002 HC RX W HCPCS: Performed by: STUDENT IN AN ORGANIZED HEALTH CARE EDUCATION/TRAINING PROGRAM

## 2024-05-21 PROCEDURE — 6370000000 HC RX 637 (ALT 250 FOR IP): Performed by: INTERNAL MEDICINE

## 2024-05-21 PROCEDURE — 6370000000 HC RX 637 (ALT 250 FOR IP): Performed by: NURSE PRACTITIONER

## 2024-05-21 PROCEDURE — 85025 COMPLETE CBC W/AUTO DIFF WBC: CPT

## 2024-05-21 PROCEDURE — 97162 PT EVAL MOD COMPLEX 30 MIN: CPT

## 2024-05-21 RX ORDER — VORICONAZOLE 200 MG/1
200 TABLET, FILM COATED ORAL EVERY 12 HOURS SCHEDULED
Qty: 180 TABLET | Refills: 0 | Status: SHIPPED | OUTPATIENT
Start: 2024-05-21 | End: 2024-08-19

## 2024-05-21 RX ORDER — PREDNISONE 20 MG/1
TABLET ORAL
Qty: 17 TABLET | Refills: 0 | Status: SHIPPED | OUTPATIENT
Start: 2024-05-22 | End: 2024-06-07

## 2024-05-21 RX ORDER — LORAZEPAM 0.5 MG/1
0.5 TABLET ORAL EVERY 6 HOURS PRN
Qty: 8 TABLET | Refills: 0 | Status: SHIPPED | OUTPATIENT
Start: 2024-05-21 | End: 2024-05-23

## 2024-05-21 RX ORDER — PANTOPRAZOLE SODIUM 40 MG/1
40 TABLET, DELAYED RELEASE ORAL
Status: DISCONTINUED | OUTPATIENT
Start: 2024-05-22 | End: 2024-05-21 | Stop reason: HOSPADM

## 2024-05-21 RX ADMIN — TRAMADOL HYDROCHLORIDE 50 MG: 50 TABLET ORAL at 07:00

## 2024-05-21 RX ADMIN — Medication 1000 UNITS: at 08:28

## 2024-05-21 RX ADMIN — VORICONAZOLE 200 MG: 200 TABLET ORAL at 00:07

## 2024-05-21 RX ADMIN — ENOXAPARIN SODIUM 40 MG: 100 INJECTION SUBCUTANEOUS at 08:28

## 2024-05-21 RX ADMIN — GABAPENTIN 600 MG: 300 CAPSULE ORAL at 08:28

## 2024-05-21 RX ADMIN — VORICONAZOLE 200 MG: 200 TABLET ORAL at 08:32

## 2024-05-21 RX ADMIN — PREDNISONE 40 MG: 20 TABLET ORAL at 08:28

## 2024-05-21 RX ADMIN — HYDROCODONE BITARTRATE AND ACETAMINOPHEN 1 TABLET: 5; 325 TABLET ORAL at 08:28

## 2024-05-21 RX ADMIN — ALBUTEROL SULFATE 2.5 MG: 2.5 SOLUTION RESPIRATORY (INHALATION) at 07:53

## 2024-05-21 RX ADMIN — HYDROCODONE BITARTRATE AND ACETAMINOPHEN 1 TABLET: 5; 325 TABLET ORAL at 03:49

## 2024-05-21 RX ADMIN — GUAIFENESIN 600 MG: 600 TABLET ORAL at 08:29

## 2024-05-21 RX ADMIN — LORAZEPAM 0.5 MG: 0.5 TABLET ORAL at 11:09

## 2024-05-21 RX ADMIN — Medication 2 PUFF: at 07:53

## 2024-05-21 RX ADMIN — LORAZEPAM 0.5 MG: 0.5 TABLET ORAL at 03:50

## 2024-05-21 RX ADMIN — ACETYLCYSTEINE 600 MG: 200 INHALANT RESPIRATORY (INHALATION) at 07:53

## 2024-05-21 RX ADMIN — ATORVASTATIN CALCIUM 20 MG: 10 TABLET, FILM COATED ORAL at 08:28

## 2024-05-21 RX ADMIN — TIOTROPIUM BROMIDE INHALATION SPRAY 2 PUFF: 3.12 SPRAY, METERED RESPIRATORY (INHALATION) at 07:53

## 2024-05-21 RX ADMIN — CYANOCOBALAMIN TAB 500 MCG 250 MCG: 500 TAB at 08:28

## 2024-05-21 ASSESSMENT — PAIN - FUNCTIONAL ASSESSMENT: PAIN_FUNCTIONAL_ASSESSMENT: PREVENTS OR INTERFERES SOME ACTIVE ACTIVITIES AND ADLS

## 2024-05-21 ASSESSMENT — PAIN SCALES - WONG BAKER
WONGBAKER_NUMERICALRESPONSE: NO HURT

## 2024-05-21 ASSESSMENT — PAIN DESCRIPTION - DESCRIPTORS
DESCRIPTORS: ACHING
DESCRIPTORS: ACHING

## 2024-05-21 ASSESSMENT — PAIN DESCRIPTION - LOCATION
LOCATION: OTHER (COMMENT)
LOCATION: GENERALIZED

## 2024-05-21 ASSESSMENT — PAIN SCALES - GENERAL
PAINLEVEL_OUTOF10: 10
PAINLEVEL_OUTOF10: 7

## 2024-05-21 ASSESSMENT — PAIN DESCRIPTION - PAIN TYPE: TYPE: CHRONIC PAIN

## 2024-05-21 ASSESSMENT — PAIN DESCRIPTION - ORIENTATION: ORIENTATION: OTHER (COMMENT)

## 2024-05-21 NOTE — CARE COORDINATION
CASE MANAGEMENT DISCHARGE SUMMARY      Discharge to: home with Critical access hospital for new [facility]    Precertification completed: n/    IMM given: 05/21/24    Transportation: private vehicle       Confirmed discharge plan with: nursing and MD     Patient: yes     Family:  yes- family member at bedside     Facility/Agency, name:  [facility] pulled from [facility] per Critical access hospital liasion.

## 2024-05-21 NOTE — DISCHARGE SUMMARY
V2.0  Discharge Summary    Name:  Valery Salcido /Age/Sex: 1955 (68 y.o. female)   Admit Date: 2024  Discharge Date: 24    MRN & CSN:  [id number] & [id number] Encounter Date and Time 24 8:40 AM EDT    Attending:  Andrew Quevedo MD Discharging Provider: Jamal Morris DO       Hospital Course:     Brief HPI: Valery Salcido is a 68 y.o. female with a pmh of disc degenerative disease, hyperlipidemia, fibromyalgia, COPD, and sciatica who presented with COPD (chronic obstructive pulmonary disease) (HCC). Her symptoms started a couple day prior to admission and were associated with shortness of breath, left sided chest pain, left upper abdominal pain, and cough with sputum production. Initial physical exam revealed an anxious and agitated woman in acute respiratory distress with tachycardia, tachypnea, wheezing, rhonchi, and decreased breath sounds. Initial CBC, CMP, Troponin, Pro-BNP, Lactic acid, and Venous blood gas were abnormal. Chest x-ray revealed: no acute cardiopulmonary process, and moderate emphysema. Patient was admitted for further work-up and treatment. During hospitalization patient underwent a bronchoscopy, pulmonary toilet, IV antibiotics, and supplemental oxygen. Pulmonary, Infectious disease, and psychiatry was consulted. Patient symptoms improved over the duration of her stay and she was prescribed a steroid taper, an antifungal, and a couple day supply of ativan. She was advised to get blood work (LFTs) completed monthly for the next 3 months. She is to schedule an appointment with her PCP and Infectious Disease. The patient was in stable condition at the time of discharge.     Brief Problem Based Course:   Acute Hypoxic Respiratory Failure   -Secondary to Community-acquired pneumonia and Acute exacerbation of chronic obstructive pulmonary disease  -Likely due to pneumonia leading to an acute exacerbation of COPD thus causing the acute hypoxic respiratory

## 2024-05-21 NOTE — CARE COORDINATION
CMA and GLADIS Blanco went in and spoke with patient about the [employer] notification that CM office received yesterday evening after CM hours.     After speaking with patient at length, patient is not wanting to appeal discharge as patient can't due to patient not having a d/c order. Instead patient is wishing to be discharged as patient believes she can do the something's at home that she can do here at the hospital.   Patient did state that patients daughter is the one that called [facility] about wanting to be transferred. CMA explained what [employer] was and patient is going to reach out to daughter to cancel [employer] appeal.     CMA did express to the patient, that if she wishes to go home and be discharged on this date to express this to the doctor.     Case Management will follow for any additional needs the patient may need at discharge.     IRENE Fenton

## 2024-05-21 NOTE — TELEPHONE ENCOUNTER
Submitted PA for Voriconazole  Via [facility] Key: [id number]    STATUS: Approved today  PA Case: [id number], Status: Approved, Coverage Starts on: 1/1/2024 12:00:00 AM, Coverage Ends on: 12/31/2024 12:00:00 AM.

## 2024-05-21 NOTE — PLAN OF CARE
Problem: ABCDS Injury Assessment  Goal: Absence of physical injury  5/11/2024 2128 by Rios Rico LPN  Outcome: Progressing  5/11/2024 1133 by Vilma Ross RN  Outcome: Progressing     Problem: Safety - Adult  Goal: Free from fall injury  5/11/2024 2128 by Rios Rico LPN  Outcome: Progressing  5/11/2024 1133 by Vilma Ross, RN  Outcome: Progressing     Problem: Nutrition Deficit:  Goal: Optimize nutritional status  5/11/2024 2128 by Rios Rico LPN  Outcome: Progressing  5/11/2024 1133 by Vilma Ross RN  Outcome: Progressing     Problem: Discharge Planning  Goal: Discharge to home or other facility with appropriate resources  5/11/2024 2128 by Rios Rico LPN  Outcome: Not Progressing  Flowsheets (Taken 5/11/2024 1940)  Discharge to home or other facility with appropriate resources:   Identify barriers to discharge with patient and caregiver   Arrange for needed discharge resources and transportation as appropriate   Identify discharge learning needs (meds, wound care, etc)  5/11/2024 1133 by Vilma Ross, RN  Outcome: Progressing     Problem: Pain  Goal: Verbalizes/displays adequate comfort level or baseline comfort level  5/11/2024 2128 by Rios Rico LPN  Outcome: Not Progressing  Flowsheets (Taken 5/11/2024 1940)  Verbalizes/displays adequate comfort level or baseline comfort level:   Encourage patient to monitor pain and request assistance   Assess pain using appropriate pain scale   Implement non-pharmacological measures as appropriate and evaluate response   Administer analgesics based on type and severity of pain and evaluate response  5/11/2024 1133 by Vilma Ross, RN  Outcome: Progressing     
  Problem: Discharge Planning  Goal: Discharge to home or other facility with appropriate resources  5/10/2024 2159 by Sebastian Lama RN  Outcome: Progressing  5/10/2024 1059 by Brisa Gonzalez RN  Outcome: Progressing     Problem: Pain  Goal: Verbalizes/displays adequate comfort level or baseline comfort level  5/10/2024 2159 by Sebastian Lama RN  Outcome: Progressing  5/10/2024 1059 by Brisa Gonzalez RN  Outcome: Progressing     Problem: ABCDS Injury Assessment  Goal: Absence of physical injury  5/10/2024 2159 by Sebastian Lama RN  Outcome: Progressing  5/10/2024 1059 by Brisa Gonzalez RN  Outcome: Progressing     Problem: Safety - Adult  Goal: Free from fall injury  5/10/2024 2159 by Sebastian Lama RN  Outcome: Progressing  5/10/2024 1059 by Brisa Gonzalez RN  Outcome: Progressing     Problem: Nutrition Deficit:  Goal: Optimize nutritional status  5/10/2024 2159 by Sebastian Lama RN  Outcome: Progressing  5/10/2024 1059 by Brisa Gonzalez RN  Outcome: Progressing     
  Problem: Discharge Planning  Goal: Discharge to home or other facility with appropriate resources  5/11/2024 1133 by Vilma Ross RN  Outcome: Progressing  5/10/2024 2159 by Sebastian Lama RN  Outcome: Progressing     Problem: Pain  Goal: Verbalizes/displays adequate comfort level or baseline comfort level  5/11/2024 1133 by Vilma Ross, RN  Outcome: Progressing  5/10/2024 2159 by Sebastian Lama RN  Outcome: Progressing     Problem: ABCDS Injury Assessment  Goal: Absence of physical injury  5/11/2024 1133 by Vilma Ross RN  Outcome: Progressing  5/10/2024 2159 by Sebastian Lama RN  Outcome: Progressing     Problem: Safety - Adult  Goal: Free from fall injury  5/11/2024 1133 by Vilma Ross, RN  Outcome: Progressing  5/10/2024 2159 by Sebastian Lama RN  Outcome: Progressing     Problem: Nutrition Deficit:  Goal: Optimize nutritional status  5/11/2024 1133 by Vilma Ross, RN  Outcome: Progressing  5/10/2024 2159 by Sebastian Lama RN  Outcome: Progressing     
  Problem: Discharge Planning  Goal: Discharge to home or other facility with appropriate resources  5/12/2024 1049 by Vilma Ross RN  Outcome: Progressing  5/11/2024 2128 by Rios Rico LPN  Outcome: Not Progressing  Flowsheets (Taken 5/11/2024 1940)  Discharge to home or other facility with appropriate resources:   Identify barriers to discharge with patient and caregiver   Arrange for needed discharge resources and transportation as appropriate   Identify discharge learning needs (meds, wound care, etc)     Problem: Pain  Goal: Verbalizes/displays adequate comfort level or baseline comfort level  5/12/2024 1049 by Vilma Ross RN  Outcome: Progressing  5/11/2024 2128 by Rios Rico LPN  Outcome: Not Progressing  Flowsheets (Taken 5/11/2024 1940)  Verbalizes/displays adequate comfort level or baseline comfort level:   Encourage patient to monitor pain and request assistance   Assess pain using appropriate pain scale   Implement non-pharmacological measures as appropriate and evaluate response   Administer analgesics based on type and severity of pain and evaluate response     Problem: ABCDS Injury Assessment  Goal: Absence of physical injury  5/12/2024 1049 by Vilma Ross RN  Outcome: Progressing  5/11/2024 2128 by Rios Rico LPN  Outcome: Progressing     Problem: Safety - Adult  Goal: Free from fall injury  5/12/2024 1049 by Vilma Ross RN  Outcome: Progressing  5/11/2024 2128 by Rios Rico LPN  Outcome: Progressing     Problem: Nutrition Deficit:  Goal: Optimize nutritional status  5/12/2024 1049 by Vilma Ross RN  Outcome: Progressing  5/11/2024 2128 by Rios Rico LPN  Outcome: Progressing        
  Problem: Discharge Planning  Goal: Discharge to home or other facility with appropriate resources  5/12/2024 2005 by Rios Rico LPN  Outcome: Progressing  5/12/2024 1049 by Vilma Ross, RN  Outcome: Progressing     Problem: Pain  Goal: Verbalizes/displays adequate comfort level or baseline comfort level  5/12/2024 2005 by Rios Rico LPN  Outcome: Progressing  Flowsheets (Taken 5/12/2024 1915)  Verbalizes/displays adequate comfort level or baseline comfort level:   Encourage patient to monitor pain and request assistance   Administer analgesics based on type and severity of pain and evaluate response   Assess pain using appropriate pain scale   Implement non-pharmacological measures as appropriate and evaluate response  5/12/2024 1049 by Vilma Ross, RN  Outcome: Progressing     Problem: ABCDS Injury Assessment  Goal: Absence of physical injury  5/12/2024 2005 by Rios Rico LPN  Outcome: Progressing  5/12/2024 1049 by Vilma Ross, RN  Outcome: Progressing     Problem: Safety - Adult  Goal: Free from fall injury  5/12/2024 2005 by Rios Rico LPN  Outcome: Progressing  5/12/2024 1049 by Vilma Ross, RN  Outcome: Progressing     Problem: Nutrition Deficit:  Goal: Optimize nutritional status  5/12/2024 2005 by Rios Rico LPN  Outcome: Progressing  5/12/2024 1049 by Vilma Ross, RN  Outcome: Progressing     
  Problem: Discharge Planning  Goal: Discharge to home or other facility with appropriate resources  5/13/2024 0949 by Vilma Ross RN  Outcome: Progressing  5/12/2024 2005 by Rios Rico LPN  Outcome: Progressing  Flowsheets (Taken 5/12/2024 1915)  Discharge to home or other facility with appropriate resources:   Identify barriers to discharge with patient and caregiver   Arrange for needed discharge resources and transportation as appropriate   Identify discharge learning needs (meds, wound care, etc)     Problem: Pain  Goal: Verbalizes/displays adequate comfort level or baseline comfort level  5/13/2024 0949 by Vilma Ross RN  Outcome: Progressing  5/12/2024 2005 by Rios Rico LPN  Outcome: Progressing  Flowsheets (Taken 5/12/2024 1915)  Verbalizes/displays adequate comfort level or baseline comfort level:   Encourage patient to monitor pain and request assistance   Administer analgesics based on type and severity of pain and evaluate response   Assess pain using appropriate pain scale   Implement non-pharmacological measures as appropriate and evaluate response     Problem: ABCDS Injury Assessment  Goal: Absence of physical injury  5/13/2024 0949 by Vilma Ross RN  Outcome: Progressing  5/12/2024 2005 by Rios Rico LPN  Outcome: Progressing     Problem: Safety - Adult  Goal: Free from fall injury  5/13/2024 0949 by Vilma Ross RN  Outcome: Progressing  5/12/2024 2005 by Rios Rico LPN  Outcome: Progressing     Problem: Nutrition Deficit:  Goal: Optimize nutritional status  5/13/2024 0949 by Vilma Ross RN  Outcome: Progressing  5/12/2024 2005 by Rios Rico LPN  Outcome: Progressing     
  Problem: Discharge Planning  Goal: Discharge to home or other facility with appropriate resources  5/13/2024 2236 by Rios Rico LPN  Outcome: Progressing  Flowsheets (Taken 5/13/2024 1915)  Discharge to home or other facility with appropriate resources:   Identify barriers to discharge with patient and caregiver   Arrange for needed discharge resources and transportation as appropriate   Identify discharge learning needs (meds, wound care, etc)  5/13/2024 0949 by Vilma Ross, RN  Outcome: Progressing     Problem: Pain  Goal: Verbalizes/displays adequate comfort level or baseline comfort level  5/13/2024 2236 by Rios Rico LPN  Outcome: Progressing  Flowsheets (Taken 5/13/2024 1915)  Verbalizes/displays adequate comfort level or baseline comfort level:   Encourage patient to monitor pain and request assistance   Assess pain using appropriate pain scale   Administer analgesics based on type and severity of pain and evaluate response   Implement non-pharmacological measures as appropriate and evaluate response  5/13/2024 0949 by Vilma Ross RN  Outcome: Progressing     Problem: ABCDS Injury Assessment  Goal: Absence of physical injury  5/13/2024 2236 by Rios Rico LPN  Outcome: Progressing  5/13/2024 0949 by Vilma Ross, RN  Outcome: Progressing     Problem: Safety - Adult  Goal: Free from fall injury  5/13/2024 2236 by Rios Rico LPN  Outcome: Progressing  5/13/2024 0949 by Vilma Ross, RN  Outcome: Progressing     Problem: Nutrition Deficit:  Goal: Optimize nutritional status  5/13/2024 2236 by Rios Rico LPN  Outcome: Progressing  Flowsheets (Taken 5/13/2024 1329 by Rhonda Pineda, MS, RD, LD)  Nutrient intake appropriate for improving, restoring, or maintaining nutritional needs:   Assess nutritional status and recommend course of action   Recommend appropriate diets, oral nutritional supplements, and vitamin/mineral supplements   Monitor oral intake, labs, and treatment 
  Problem: Discharge Planning  Goal: Discharge to home or other facility with appropriate resources  5/15/2024 0806 by Chayo Mckoy, RN  Outcome: Progressing  Flowsheets (Taken 5/15/2024 0800)  Discharge to home or other facility with appropriate resources: Identify barriers to discharge with patient and caregiver  5/15/2024 0440 by Rios Rico LPN  Outcome: Progressing  5/15/2024 0434 by Rios Rico LPN  Outcome: Progressing  Flowsheets (Taken 5/14/2024 1920)  Discharge to home or other facility with appropriate resources:   Identify barriers to discharge with patient and caregiver   Arrange for needed discharge resources and transportation as appropriate   Identify discharge learning needs (meds, wound care, etc)     
  Problem: Discharge Planning  Goal: Discharge to home or other facility with appropriate resources  5/17/2024 2356 by Norma Coyle RN  Outcome: Progressing  5/17/2024 1042 by Alia Pedraza RN  Outcome: Progressing     Problem: Pain  Goal: Verbalizes/displays adequate comfort level or baseline comfort level  5/17/2024 2356 by Norma Coyle RN  Outcome: Progressing  5/17/2024 1042 by Alia Pedraza RN  Outcome: Progressing     Problem: ABCDS Injury Assessment  Goal: Absence of physical injury  5/17/2024 2356 by Norma Coyle RN  Outcome: Progressing  5/17/2024 1042 by Alia Pedraza RN  Outcome: Progressing     Problem: Safety - Adult  Goal: Free from fall injury  5/17/2024 2356 by Norma Coyle RN  Outcome: Progressing  5/17/2024 1042 by Alia Pedraza RN  Outcome: Progressing     Problem: Nutrition Deficit:  Goal: Optimize nutritional status  5/17/2024 2356 by Norma Coyle RN  Outcome: Progressing  5/17/2024 1042 by Alia Pedraza RN  Outcome: Progressing     Problem: Neurosensory - Adult  Goal: Achieves stable or improved neurological status  5/17/2024 2356 by Norma Coyle RN  Outcome: Progressing  5/17/2024 1042 by Alia Pedraza RN  Outcome: Progressing     Problem: Cardiovascular - Adult  Goal: Maintains optimal cardiac output and hemodynamic stability  5/17/2024 2356 by Norma Coyle RN  Outcome: Progressing  5/17/2024 1042 by Alia Pedraza RN  Outcome: Progressing     Problem: Skin/Tissue Integrity - Adult  Goal: Skin integrity remains intact  5/17/2024 2356 by Norma Coyle RN  Outcome: Progressing  5/17/2024 1042 by Alia Pedraza RN  Outcome: Progressing     Problem: Musculoskeletal - Adult  Goal: Return mobility to safest level of function  5/17/2024 2356 by Norma Coyle RN  Outcome: Progressing  5/17/2024 1042 by Alia Pedraza RN  Outcome: Progressing     Problem: Gastrointestinal - Adult  Goal: Minimal or 
  Problem: Discharge Planning  Goal: Discharge to home or other facility with appropriate resources  5/8/2024 2114 by Sebastian Lama RN  Outcome: Progressing  5/8/2024 1759 by Patricia Pruitt RN  Outcome: Progressing  Flowsheets  Taken 5/8/2024 1649  Discharge to home or other facility with appropriate resources:   Identify barriers to discharge with patient and caregiver   Identify discharge learning needs (meds, wound care, etc)   Refer to discharge planning if patient needs post-hospital services based on physician order or complex needs related to functional status, cognitive ability or social support system  Taken 5/8/2024 1545  Discharge to home or other facility with appropriate resources:   Identify barriers to discharge with patient and caregiver   Arrange for needed discharge resources and transportation as appropriate   Identify discharge learning needs (meds, wound care, etc)     Problem: Pain  Goal: Verbalizes/displays adequate comfort level or baseline comfort level  5/8/2024 2114 by Sebastian Lama, RN  Outcome: Progressing  5/8/2024 1759 by Patricia Pruitt RN  Outcome: Progressing     Problem: ABCDS Injury Assessment  Goal: Absence of physical injury  5/8/2024 2114 by Sebastian Lama RN  Outcome: Progressing  5/8/2024 1759 by Patricia Pruitt RN  Outcome: Progressing     Problem: Safety - Adult  Goal: Free from fall injury  Outcome: Progressing     
  Problem: Discharge Planning  Goal: Discharge to home or other facility with appropriate resources  5/9/2024 2025 by Sebastian Lama RN  Outcome: Progressing  5/9/2024 1834 by JONATHAN COLON  Outcome: Progressing     Problem: Pain  Goal: Verbalizes/displays adequate comfort level or baseline comfort level  5/9/2024 2025 by Sebastian Lama RN  Outcome: Progressing  5/9/2024 1834 by JONATHAN COLON  Outcome: Progressing     Problem: ABCDS Injury Assessment  Goal: Absence of physical injury  5/9/2024 2025 by Sebastian Lama RN  Outcome: Progressing  5/9/2024 1834 by JONATHAN COLON  Outcome: Progressing     Problem: Safety - Adult  Goal: Free from fall injury  5/9/2024 2025 by Sebastian Lama RN  Outcome: Progressing  5/9/2024 1834 by JONATHAN COLON  Outcome: Progressing     Problem: Nutrition Deficit:  Goal: Optimize nutritional status  5/9/2024 2025 by Sebastian Lama RN  Outcome: Progressing  5/9/2024 1834 by JONATHAN COLON  Outcome: Progressing     
  Problem: Discharge Planning  Goal: Discharge to home or other facility with appropriate resources  Outcome: Progressing     Problem: Pain  Goal: Verbalizes/displays adequate comfort level or baseline comfort level  Outcome: Progressing     
  Problem: Discharge Planning  Goal: Discharge to home or other facility with appropriate resources  Outcome: Progressing     Problem: Pain  Goal: Verbalizes/displays adequate comfort level or baseline comfort level  Outcome: Progressing     Problem: ABCDS Injury Assessment  Goal: Absence of physical injury  Outcome: Progressing     Problem: Safety - Adult  Goal: Free from fall injury  Outcome: Progressing     Problem: Nutrition Deficit:  Goal: Optimize nutritional status  Outcome: Progressing     
  Problem: Discharge Planning  Goal: Discharge to home or other facility with appropriate resources  Outcome: Progressing     Problem: Pain  Goal: Verbalizes/displays adequate comfort level or baseline comfort level  Outcome: Progressing     Problem: Safety - Adult  Goal: Free from fall injury  Outcome: Progressing     
  Problem: Discharge Planning  Goal: Discharge to home or other facility with appropriate resources  Outcome: Progressing     Problem: Pain  Goal: Verbalizes/displays adequate comfort level or baseline comfort level  Outcome: Progressing     Problem: Safety - Adult  Goal: Free from fall injury  Outcome: Progressing     Problem: Musculoskeletal - Adult  Goal: Return mobility to safest level of function  Outcome: Progressing     
  Problem: Discharge Planning  Goal: Discharge to home or other facility with appropriate resources  Outcome: Progressing  Flowsheets (Taken 5/14/2024 1920)  Discharge to home or other facility with appropriate resources:   Identify barriers to discharge with patient and caregiver   Arrange for needed discharge resources and transportation as appropriate   Identify discharge learning needs (meds, wound care, etc)     Problem: Pain  Goal: Verbalizes/displays adequate comfort level or baseline comfort level  Outcome: Progressing  Flowsheets (Taken 5/14/2024 1920)  Verbalizes/displays adequate comfort level or baseline comfort level:   Encourage patient to monitor pain and request assistance   Assess pain using appropriate pain scale   Administer analgesics based on type and severity of pain and evaluate response   Implement non-pharmacological measures as appropriate and evaluate response     Problem: ABCDS Injury Assessment  Goal: Absence of physical injury  Outcome: Progressing     Problem: Safety - Adult  Goal: Free from fall injury  Outcome: Progressing     
  Problem: Discharge Planning  Goal: Discharge to home or other facility with appropriate resources  Outcome: Progressing  Flowsheets (Taken 5/15/2024 2033)  Discharge to home or other facility with appropriate resources:   Identify barriers to discharge with patient and caregiver   Arrange for needed discharge resources and transportation as appropriate     Problem: Pain  Goal: Verbalizes/displays adequate comfort level or baseline comfort level  Outcome: Progressing     Problem: ABCDS Injury Assessment  Goal: Absence of physical injury  Outcome: Progressing  Flowsheets (Taken 5/15/2024 2033)  Absence of Physical Injury: Implement safety measures based on patient assessment     Problem: Safety - Adult  Goal: Free from fall injury  Outcome: Progressing     Problem: Nutrition Deficit:  Goal: Optimize nutritional status  Outcome: Progressing     
  Problem: Pain  Goal: Verbalizes/displays adequate comfort level or baseline comfort level  Outcome: Progressing     Problem: ABCDS Injury Assessment  Goal: Absence of physical injury  Outcome: Progressing     
Encourage patient to ask for pain medication before pain is above a 5 on the 1/10 pain scale.  Medicate before PT or increased activity. Use alternatives such as ice (if ordered) or distraction as often as possible to minimize  need for medication.   Monitor for diarrhea, collect specimen for C diff, Monitor I and O and electrolytes.   Monitor respiratory status, O2 saturation, breath sounds.  Protect patient from fall injury by keeping bed in low position, use of non skid socks and bed alarm system. Keep belongings and call light with reach at all times and following fall protocol.   
Increase function to baseline    
Increase patients ADLs/functional status to baseline.    
Pt encouraged to call out when in need. Call light and bedside table within reach . Bed rails up 3/4 wheels locked with bed in lowest position. Non skids on with bed alarm engaged. Pt verbalizes understanding , will continue To monitor.   
Metabolic/Fluid and Electrolytes - Adult  Goal: Electrolytes maintained within normal limits  5/21/2024 0136 by Mary Hanna RN  Outcome: Progressing     Problem: Hematologic - Adult  Goal: Maintains hematologic stability  5/21/2024 0136 by Mary Hanna, RN  Outcome: Progressing     
medications as needed     Problem: Genitourinary - Adult  Goal: Absence of urinary retention  Outcome: Progressing  Flowsheets (Taken 5/18/2024 2100)  Absence of urinary retention: Assess patient’s ability to void and empty bladder     Problem: Infection - Adult  Goal: Absence of infection at discharge  Outcome: Progressing     Problem: Metabolic/Fluid and Electrolytes - Adult  Goal: Electrolytes maintained within normal limits  Outcome: Progressing     Problem: Hematologic - Adult  Goal: Maintains hematologic stability  Outcome: Progressing     
mobility to safest level of function  5/17/2024 1042 by Alia Pedraza RN  Outcome: Progressing  5/17/2024 0506 by Paulette Stover RN  Outcome: Progressing     Problem: Gastrointestinal - Adult  Goal: Minimal or absence of nausea and vomiting  5/17/2024 1042 by Alia Pedraza RN  Outcome: Progressing  5/17/2024 0506 by Paulette Stover RN  Outcome: Progressing     Problem: Genitourinary - Adult  Goal: Absence of urinary retention  5/17/2024 1042 by Alia Pedraza RN  Outcome: Progressing  5/17/2024 0506 by Paulette Stover RN  Outcome: Progressing     Problem: Infection - Adult  Goal: Absence of infection at discharge  5/17/2024 1042 by Alia Pedraza RN  Outcome: Progressing  5/17/2024 0506 by Paulette Stover RN  Outcome: Progressing     Problem: Metabolic/Fluid and Electrolytes - Adult  Goal: Electrolytes maintained within normal limits  5/17/2024 1042 by Alia Pedraza RN  Outcome: Progressing  5/17/2024 0506 by Paulette Stover RN  Outcome: Progressing     Problem: Hematologic - Adult  Goal: Maintains hematologic stability  5/17/2024 1042 by Alia Pedraza RN  Outcome: Progressing  5/17/2024 0506 by Paulette Stover RN  Outcome: Progressing     
vitamin/mineral supplements   Monitor oral intake, labs, and treatment plans

## 2024-05-21 NOTE — DISCHARGE INSTR - COC
Valery Salcido  is necessary for the continuing treatment of the diagnosis listed and that she requires Home Care for less 30 days.     Update Admission H&P: No change in H&P    PHYSICIAN SIGNATURE:  Electronically signed by Jamal Morris DO on 5/21/24 at 9:30 AM EDT

## 2024-05-21 NOTE — TELEPHONE ENCOUNTER
Received Prior Authorization request from [facility].  PA needed for Voriconazole    Key: [id number]

## 2024-05-22 NOTE — PROGRESS NOTES
Infectious Disease Follow up Notes  CC :      Antibiotics:  Zosyn 3.375 q8, s 5/18/24  Voriconazole 200 po BID    Rocephin 5/8-5/13  Azithro 5/8-5/12    Prednisone 40 daily      Admit Date:   5/8/2024  Hospital Day: 13    Subjective:   She remains AF   On 1L NC   She feels SOB with any activity.  Very frustrated, feels like she is not getting better.  Asking for DC yet also stating she is afraid to leave.      Objective:     Patient Vitals for the past 8 hrs:   BP Temp Temp src Pulse Resp SpO2   05/20/24 0825 -- -- -- -- 16 --   05/20/24 0800 (!) 145/74 97.8 °F (36.6 °C) Oral 83 16 97 %   05/20/24 0755 -- -- -- -- 18 --   05/20/24 0744 -- -- -- -- -- 96 %       EXAM:  General:   alert, NAD   Flat affect, confrontational at times     HEENT:   NCAT, PERRL, sclera anicteric   NECK:   Supple, symmetrical   LUNGS:   non-labored breathing  Decreased BS throughout   ABD:  soft, flat, NT   EXT:  no focal rash        LINE:   PIV in place         Scheduled Meds:   albuterol  2.5 mg Nebulization BID RT    predniSONE  40 mg Oral Daily    voriconazole  200 mg Oral 2 times per day    polyethylene glycol  17 g Oral BID    sennosides-docusate sodium  2 tablet Oral Daily    piperacillin-tazobactam  3,375 mg IntraVENous Q8H    vitamin B-12  250 mcg Oral Daily    Vitamin D  1,000 Units Oral Daily    pantoprazole  40 mg IntraVENous Daily    gabapentin  600 mg Oral TID    acetylcysteine  600 mg Inhalation BID RT    guaiFENesin  600 mg Oral BID    lidocaine  1 patch TransDERmal Daily    atorvastatin  20 mg Oral Daily    montelukast  10 mg Oral Nightly    sodium chloride flush  5-40 mL IntraVENous 2 times per day    enoxaparin  40 mg SubCUTAneous Daily    mometasone-formoterol  2 puff Inhalation BID RT    And    tiotropium  2 puff Inhalation Daily RT       Continuous Infusions:   sodium chloride 25 mL/hr at 05/13/24 0840          Data Review:    Lab Results 
    V2.0    [facility] Progress Note      Name:  Valery Salcido /Age/Sex: 1955  (68 y.o. female)   MRN & CSN:  [id number] & [id number] Encounter Date/Time: 2024 7:49 AM EDT   Location:  [facility]/0431-01 PCP: Vivian Jay MD     Attending:Andrew Quevedo MD       Hospital Day: 13    Assessment and Recommendations   Valery Salcido is a 68 y.o. female with a pmh of disc degenerative disease, hyperlipidemia, fibromyalgia, COPD, and sciatica who presents with COPD (chronic obstructive pulmonary disease) (HCC).    Plan:   Acute Hypoxic Respiratory Failure   -Secondary to Community-acquired pneumonia and Acute exacerbation of chronic obstructive pulmonary disease  -Likely due to pneumonia leading to an acute exacerbation of COPD thus causing the acute hypoxic respiratory failure   -Continue Acapella to increase release of sputum  -Continue Robitussin for cough suppression  -Wean Ativan and give hydroxyzine as needed  -Pulmonary was following. Bronchoscopy attempted on . Procedure was discontinued prior to completion due to difficulty achieving adequate sedation. Some mucous plugs were cleared on the right. Patient was very upset afterwards as she had a very unpleasant experience. She does not wish to have any further input from Pulmonology and she does not want any further Bronchoscopy. Given her improving symptoms, will continue conservative pulmonary toilet measures.   -Wean O2 as tolerated; consider home O2.   -Infectious disease consulted: Culture showed Aspergillus  -Ordered 6 minute walk test  -Continue Deltasone  -Continue Voriconazole 200 mg BID: Will need for 3 months  -Continue IV Zosyn    Hyperlipidemia  -Continue Lipitor    GERD  -Continue Protonix    PTSD  -Psychiatry is following     Diet ADULT DIET; Regular  ADULT ORAL NUTRITION SUPPLEMENT; Breakfast; Other Oral Supplement; Yogurt please  ADULT ORAL NUTRITION SUPPLEMENT; Breakfast, Lunch, Dinner; Other Oral Supplement; Cottage cheese 
   05/15/24 2156   Oxygen Therapy/Pulse Ox   O2 Therapy Oxygen   O2 Device Nasal cannula   O2 Flow Rate (L/min) 2 L/min   Pulse 88   Respirations 20   SpO2 93 %   $Pulse Oximeter $Overnight     Patient placed on overnight pulse ox. No complications noted.  
   05/19/24 1920   RT Protocol   History Pulmonary Disease 2   Respiratory pattern 2   Breath sounds 2   Cough 0   Indications for Bronchodilator Therapy On home bronchodilators   Bronchodilator Assessment Score 6       
   05/21/24 0755   Oxygen Therapy/Pulse Ox   O2 Therapy Room air   O2 Device None (Room air)   Pulse 96   Respirations 20   SpO2 95 %       
  Hospital Medicine Progress Note      Date of Admission: 5/8/2024  Hospital Day: 2    Chief Admission Complaint:  Dyspnea     Subjective: Patient is in hospital bed alert and oriented.  No new issues or complaints today.  Patient states she feels \"not very good \".  Continues to have productive cough with yellow sputum.  Has chest and back pain diffusely along the thorax.Denies fevers, chills, sweats. Denies nausea, vomiting, or diarrhea. Denies changes in urination. Spoke with nursing staff and was informed of no acute issues overnight.      In the room patient was visibly coughing and became even more exacerbated from the amount of coughing requiring oxygen upwards of 5 L supplemental O2 via nasal cannula.    Presenting Admission History:       68 y.o. female ex-smoker w/ hx presumed COPD who presented to [facility] with a several day hx of progressive SOB/WELLER w/ mild L sided CP, worse w/ cough and now severe, limiting her ADLs and much worse w/ any exertion.  Cough is non-productive but she does endorse feeling generally ill over the last several days, since Thurs/Friday 2/3 May actually.     The patient denies any fever/chills or other signs/sxs of systemic illness or identifiable aggravating/alleviating factors\"    Assessment/Plan:      Current Principal Problem:  COPD (chronic obstructive pulmonary disease) (HCC)    Acute hypoxic respiratory failure  -Etiology likely due to pneumonia leading to an acute exacerbation of COPD thus causing the acute hypoxic respiratory failure  -As evident by dyspnea, increased work of breathing, and increased supplemental O2  -Continue with steroids and antibiotics at this time  -Will continue to monitor respiratory status daily  -Will wean supplemental O2 as patient tolerates with an SpO2 greater than 88%  -Ordered Acapella to increase release of sputum  -Add Robitussin for cough suppression, thus leading to less exacerbation at this time    HyperLipidemia - normally 
  Hospital Medicine Progress Note      Date of Admission: 5/8/2024  Hospital Day: 3    Chief Admission Complaint:  Dyspnea     Subjective: Patient is in hospital noted alert and oriented.  No new issues or complaints today.  Patient states that she feels \"somewhat better \".  Patient states that her breathing is shaky.  Patient denies shortness of breath at rest, however has shortness of breath with ambulation.  Continues to have chest pain that is extending from anterior to posterior thorax.  Continues to have cough with decreased sputum production.  Patient has been using Acapella. Denies fevers, chills, sweats. Denies palpitations. Denies  Denies nausea, vomiting, or diarrhea. Denies changes in urination. Spoke with nursing staff and was informed of no acute issues overnight.  Informed by nursing staff that they have been weaning supplemental O2 and is currently on 2 L.    Presenting Admission History:       68 y.o. female ex-smoker w/ hx presumed COPD who presented to [facility] with a several day hx of progressive SOB/WELLER w/ mild L sided CP, worse w/ cough and now severe, limiting her ADLs and much worse w/ any exertion.  Cough is non-productive but she does endorse feeling generally ill over the last several days, since Thurs/Friday 2/3 May actually.     The patient denies any fever/chills or other signs/sxs of systemic illness or identifiable aggravating/alleviating factors\"    Assessment/Plan:      Current Principal Problem:  COPD (chronic obstructive pulmonary disease) (HCC)    Acute hypoxic respiratory failure  -Etiology likely due to pneumonia leading to an acute exacerbation of COPD thus causing the acute hypoxic respiratory failure  -As evident by dyspnea, increased work of breathing, and increased supplemental O2  -Continue with steroids and antibiotics at this time  -Will continue to monitor respiratory status daily  -Will wean supplemental O2 as patient tolerates with an SpO2 greater than 
  Hospital Medicine Progress Note      Date of Admission: 5/8/2024  Hospital Day: 5    Chief Admission Complaint:  Dyspnea     Subjective: Patient is in hospital bed alert and oriented.  No new issues complaints today.  Patient states that she feels \"a little better \".  Continues to have shortness of breath at rest and with ambulation.  Cough worsens shortness of breath.  Patient has been having small flecks of blood and sputum.  Currently on 2 L supplemental O2 via nasal cannula. Denies fevers, chills, sweats. Denies nausea, vomiting, or diarrhea. Denies changes in urination. Spoke with nursing staff and was informed of no acute issues overnight.    Presenting Admission History:       68 y.o. female ex-smoker w/ hx presumed COPD who presented to [facility] with a several day hx of progressive SOB/WELLER w/ mild L sided CP, worse w/ cough and now severe, limiting her ADLs and much worse w/ any exertion.  Cough is non-productive but she does endorse feeling generally ill over the last several days, since Thurs/Friday 2/3 May actually.     The patient denies any fever/chills or other signs/sxs of systemic illness or identifiable aggravating/alleviating factors\"    Assessment/Plan:      Current Principal Problem:  COPD (chronic obstructive pulmonary disease) (HCC)    Acute hypoxic respiratory failure 2/2 CAP & AECOPD  -Etiology likely due to pneumonia leading to an acute exacerbation of COPD thus causing the acute hypoxic respiratory failure  -As evident by dyspnea, increased work of breathing, and increased supplemental O2  -Repeat CXR showed COPD  -Continue steroids and antibiotics   -Continue Acapella to increase release of sputum  -Continue Robitussin for cough suppression  -Continue ativan for anxiety; thought is to relieve some exacerbation symptoms   -Continue hypertonic saline nebulizers with duonebs  -Continue to monitor respiratory status daily  -Started incentive spirometry   -Will wean supplemental O2 as 
  Hospital Medicine Progress Note      Date of Admission: 5/8/2024  Hospital Day: 6    Chief Admission Complaint:  Dyspnea     Subjective: Still with increased SOB at rest. No chest pain. Anxious.     Presenting Admission History:       68 y.o. female ex-smoker w/ hx presumed COPD who [facility] with a several day hx of progressive SOB/WELLER w/ mild L sided CP, worse w/ cough and now severe, limiting her ADLs and much worse w/ any exertion.  Cough is non-productive but she does endorse feeling generally ill over the last several days, since Thurs/Friday 2/3 May actually.     The patient denies any fever/chills or other signs/sxs of systemic illness or identifiable aggravating/alleviating factors\"    Assessment/Plan:      Current Principal Problem:  COPD (chronic obstructive pulmonary disease) (HCC)    Acute hypoxic respiratory failure 2/2 CAP & AECOPD  -Etiology likely due to pneumonia leading to an acute exacerbation of COPD thus causing the acute hypoxic respiratory failure  -As evident by dyspnea, increased work of breathing, and increased supplemental O2  -Continue steroids and antibiotics   -Continue Acapella to increase release of sputum  -Continue Robitussin for cough suppression  -Continue ativan for anxiety  -Continue hypertonic saline nebulizers with duonebs  -Continue to monitor respiratory status daily  -Started incentive spirometry   -Will wean supplemental O2 as patient tolerates with an SpO2 greater than 88%    HyperLipidemia - normally controlled on home Statin. Continued.  F/U w/ PCP outpt for medication initiation/adjustment as needed.     GERD - w/out active signs/sxs of dysphagia/odynophagia. No evidence of active PUD or hx of GI bleed. Controlled on home PPI - continue.      Physical Exam Performed:      General appearance:  No apparent distress  Respiratory: Increased work of breathing.  No wheezing or rhonchi.   Cardiovascular:  Regular rate and rhythm.  Abdomen:  Soft, 
  Hospital Medicine Progress Note      Date of Admission: 5/8/2024  Hospital Day: 7    Chief Admission Complaint:  Dyspnea     Subjective: Still with increased SOB at rest. Increased congestion. Less anxious.     Presenting Admission History:       68 y.o. female ex-smoker w/ hx presumed COPD who presented to [facility] with a several day hx of progressive SOB/WELLER w/ mild L sided CP, worse w/ cough and now severe, limiting her ADLs and much worse w/ any exertion.  Cough is non-productive but she does endorse feeling generally ill over the last several days, since Thurs/Friday 2/3 May actually.     The patient denies any fever/chills or other signs/sxs of systemic illness or identifiable aggravating/alleviating factors\"    Assessment/Plan:      Acute hypoxic respiratory failure 2/2 CAP & AECOPD  -Etiology likely due to pneumonia leading to an acute exacerbation of COPD thus causing the acute hypoxic respiratory failure  -As evident by dyspnea, increased work of breathing, and increased supplemental O2  -Continue steroids and antibiotics   -Continue Acapella to increase release of sputum  -Continue Robitussin for cough suppression  -Continue ativan for anxiety  -Continue hypertonic saline nebulizers with duonebs  -Continue to monitor respiratory status daily  -Started incentive spirometry   -Will wean supplemental O2 as patient tolerates with an SpO2 greater than 88%  -Pulmonary consulted for more aggressive airway clearance, possible Bronch.     HyperLipidemia - normally controlled on home Statin. Continued.  F/U w/ PCP outpt for medication initiation/adjustment as needed.     GERD - w/out active signs/sxs of dysphagia/odynophagia. No evidence of active PUD or hx of GI bleed. Controlled on home PPI - continue.      Physical Exam Performed:      General appearance:  No apparent distress  Respiratory: Increased work of breathing.  No wheezing or rhonchi.   Cardiovascular:  Regular rate and rhythm.  Abdomen:  Soft, 
  Physician Progress Note      PATIENT:               HANNAH OTURE  [facility] #:                  [id number]  :                       1955  ADMIT DATE:       2024 10:45 AM  DISCH DATE:  RESPONDING  PROVIDER #:        Don Ramos DO          QUERY TEXT:    Patient admitted with PNA and COPD exacerbation. Noted documentation of \"Acute   Respiratory Failure - w/ hypoxia\" in H/P 24. In order to support the   diagnosis of acute respiratory failure, please include additional clinical   indicators in your documentation.  Or please document if the diagnosis of   acute respiratory failure has been ruled out after further study.      The medical record reflects the following:  Risk Factors: PNA, COPD exacerbation  Clinical Indicators: H/P \"Acute Respiratory Failure - w/ hypoxia, PO Arrival.    Presence of clinical respiratory distress w/  tachypnea/dyspnea/SOB and wheezing w/ use of accessory muscles to breath.    Supplemental O2 and wean as tolerated.\";  VBG-pO2=32.5, pCO2=48.5, O2 sat.=57%  Treatment: Room air, CXR, Dexamethasone, IV Rocephin, Azithromycin, DUONEB,   Trellegy Ellipta    Acute Respiratory Failure Clinical Indicators per  MS-DRG Training Guide and   Quick Reference Guide:  pO2 < 60 mmHg or SpO2 (pulse oximetry) < 91% breathing room air  pCO2 > 50 and pH < 7.35  P/F ratio (pO2 / FIO2) < 300  pO2 decrease or pCO2 increase by 10 mmHg from baseline (if known)  Supplemental oxygen of 40% or more  Presence of respiratory distress, tachypnea, dyspnea, shortness of breath,   wheezing  Unable to speak in complete sentences  Use of accessory muscles to breathe  Extreme anxiety and feeling of impending doom  Tripod position  Confusion/altered mental status/obtunded  Options provided:  -- Acute Respiratory Failure as evidenced by, Please document evidence.  -- Acute Respiratory Failure ruled out after study  -- Other - I will add my own diagnosis  -- Disagree - Not applicable / Not valid  -- Disagree - 
  Physician Progress Note      PATIENT:               HANNAH TOURE  [facility] #:                  [id number]  :                       1955  ADMIT DATE:       2024 10:45 AM  DISCH DATE:  RESPONDING  PROVIDER #:        Don Ramos DO          QUERY TEXT:    Patient admitted with PNA. Noted to have Severe malnutrition by dietician   assessment on 24. If possible, please document in progress notes and   discharge summary if you are evaluating and /or treating any of the following:    The medical record reflects the following:  Risk Factors: COPD  Clinical Indicators:  dietician notes: Severe malnutrition. Energy Intake:    75% or less estimated energy requirements for 1 month  or longer Body Fat Loss:   (Moderate body fat loss) Triceps, Orbital Muscle   Mass Loss:  Severe muscle mass loss Clavicles  (pectoralis & deltoids), Temples (temporalis)  Treatment: CBC, CMP, dietician consult, per recommendation \" regular diet and   encourage PO intake, add ensure w/ meals, trial  magic cups w/ dinner Monitor nutrition adequacy, pertinent labs, bowel habits,   wt changes, and clinical progress      ASPEN Criteria:    https://aspenjournals.onlinelibrary.alfonso.com/doi/full/10.1177/889245892424748  5  Options provided:  -- Protein calorie malnutrition severe  -- Other - I will add my own diagnosis  -- Disagree - Not applicable / Not valid  -- Disagree - Clinically unable to determine / Unknown  -- Refer to Clinical Documentation Reviewer    PROVIDER RESPONSE TEXT:    This patient has severe protein calorie malnutrition.    Query created by: Swapna Adkins on 5/10/2024 1:01 PM      Electronically signed by:  Don Ramos DO 5/10/2024 1:24 PM          
4 Eyes Skin Assessment     NAME:  Valery Salcido  YOB: 1955  MEDICAL RECORD NUMBER:  [id number]    The patient is being assessed for  Admission    I agree that at least one RN has performed a thorough Head to Toe Skin Assessment on the patient. ALL assessment sites listed below have been assessed.  Patient has multiple bruises, both ankles, abdomin, arms, and scattered scabbed abrasions on legs and arms. No pressure areas    Areas assessed by both nurses:    Head, Face, Ears, Shoulders, Back, Chest, Arms, Elbows, Hands, Sacrum. Buttock, Coccyx, Ischium, Legs. Feet and Heels, and Under Medical Devices         Does the Patient have a Wound? No noted wound(s)       Yadiel Prevention initiated by RN: No  Wound Care Orders initiated by RN: No    Pressure Injury (Stage 3,4, Unstageable, DTI, NWPT, and Complex wounds) if present, place Wound referral order by RN under : No    New Ostomies, if present place, Ostomy referral order under : No     Nurse 1 eSignature: Electronically signed by Patricia Pruitt RN on 5/8/24 at 4:22 PM EDT    **SHARE this note so that the co-signing nurse can place an eSignature**    Nurse 2 eSignature: Electronically signed by Kim Rothman RN on 5/8/24 at 5:00 PM EDT  
Attempted to call report to C4 RN Gen, was told she will return call.  
Comprehensive Nutrition Assessment    Type and Reason for Visit:  Initial, Positive Nutrition Screen    Nutrition Recommendations/Plan:   Continue regular diet and encourage PO intake   RD to add ensure w/ meals, pt likes all flavors  Will trial magic cups w/ dinner   RD to order new updated weight  Monitor nutrition adequacy, pertinent labs, bowel habits, wt changes, and clinical progress     Malnutrition Assessment:  Malnutrition Status:  Severe malnutrition (05/09/24 1354)    Context:  Chronic Illness     Findings of the 6 clinical characteristics of malnutrition:  Energy Intake:  75% or less estimated energy requirements for 1 month or longer  Body Fat Loss:   (Moderate body fat loss) Triceps, Orbital   Muscle Mass Loss:  Severe muscle mass loss Clavicles (pectoralis & deltoids), Temples (temporalis)    Nutrition Assessment:    Positive nutrition screen for wt loss and poor appetite: 68 y.o. f w/ PMH of COPD admitted w/ progressive SOB/WELLER w/ mild L sided CP, worse w/ cough. On regular diet. Pt reports poor intakes and 28 lb wt loss over the past 2-3 months d/t being sick. Reports only eating a few bites of her meals. Reports UBW= 140 lb, CL wt changes d/t lack of wt hx. Reports drinking ensure PTA, would like to trial here. RD to add w/ meals. Will also add magic cups. Continue to encourage PO intake, will continue to monitor.    Nutrition Related Findings:    Labs reviewed. No BM Wound Type: None       Current Nutrition Intake & Therapies:    Average Meal Intake: 1-25%  Average Supplements Intake: None Ordered  ADULT DIET; Regular  ADULT ORAL NUTRITION SUPPLEMENT; Lunch, Dinner, Breakfast; Standard High Calorie/High Protein Oral Supplement  ADULT ORAL NUTRITION SUPPLEMENT; Dinner; Frozen Oral Supplement    Anthropometric Measures:  Height: 165.1 cm (5' 5\")  Ideal Body Weight (IBW): 125 lbs (57 kg)       Current Body Weight: 51.3 kg (113 lb), 90.4 % IBW. Weight Source: Not Specified  Current BMI (kg/m2): 
Comprehensive Nutrition Assessment    Type and Reason for Visit:  Reassess    Nutrition Recommendations/Plan:   Continue regular diet and encourage PO intake   Continue vanilla/strawberry ensure and add cottage cheese w/ meals   Add yogurt w/ breakfast   Monitor nutrition adequacy, pertinent labs, bowel habits, wt changes, and clinical progress     Malnutrition Assessment:  Malnutrition Status:  Severe malnutrition (05/13/24 1334)    Context:  Chronic Illness     Findings of the 6 clinical characteristics of malnutrition:  Energy Intake:  75% or less estimated energy requirements for 1 month or longer   Body Fat Loss:   (Moderate body fat loss) Triceps, Orbital   Muscle Mass Loss:  Severe muscle mass loss Clavicles (pectoralis & deltoids), Temples (temporalis)    Nutrition Assessment:    Follow up: Psych following. On 1 L/min NC. Bronchoscopy on 5/16, unable to be performed. Pt continues on regular diet. PO intakes 1-50%, % of meals. Pt reports appetite improving recently, able to eat almost all of breakfast today. Reports steriods are improving appetite. Reports good acceptance of ensure, drinking 2-3 daily. Likes vanilla/strawberry. Pt would also like cottage cheese w/ meals and yogurt with breakfast. RD to add. Continue to encourage PO intake, will continue to monitor.    Nutrition Related Findings:    Na 135. + BM yesterday. -2.9 L since admission. On prednisone Wound Type: None       Current Nutrition Intake & Therapies:    Average Meal Intake: 51-75%, %, 1-25%  Average Supplements Intake: %  ADULT DIET; Regular  ADULT ORAL NUTRITION SUPPLEMENT; Breakfast; Other Oral Supplement; Yogurt please  ADULT ORAL NUTRITION SUPPLEMENT; Breakfast, Lunch, Dinner; Other Oral Supplement; Cottage cheese bowl please  ADULT ORAL NUTRITION SUPPLEMENT; Breakfast, Lunch, Dinner; Standard High Calorie/High Protein Oral Supplement    Anthropometric Measures:  Height: 165.1 cm (5' 5\")  Ideal Body Weight (IBW): 125 
Comprehensive Nutrition Assessment    Type and Reason for Visit:  Reassess    Nutrition Recommendations/Plan:   Resume regular diet when able to consume PO   Resume ensure and cottage cheese BID and yogurt once daily   RD to order new updated weight  Monitor nutrition adequacy, pertinent labs, bowel habits, wt changes, and clinical progress     Malnutrition Assessment:  Malnutrition Status:  Severe malnutrition (05/13/24 1334)    Context:  Chronic Illness     Findings of the 6 clinical characteristics of malnutrition:  Energy Intake:  75% or less estimated energy requirements for 1 month or longer  Body Fat Loss:   (Moderate body fat loss) Triceps, Orbital   Muscle Mass Loss:  Severe muscle mass loss Clavicles (pectoralis & deltoids), Temples (temporalis)    Nutrition Assessment:    Follow up: Pt previously on regular diet, PO intakes 1-25%, 51-76% of meals in EMR. NPO today for bronch. On 2L O2. Pt at bronch at time of visit. Will order updated weight. Ensure, yogurt and cottage cheese added previously, recommend resuming when able. If intakes do not improve, may need to consider alternative methods of nutrition, consult RD for recommendations.    Nutrition Related Findings:    No updated labs. -5.6 L since admission. No BM Wound Type: None       Current Nutrition Intake & Therapies:    Average Meal Intake: 1-25%, 51-75%  Average Supplements Intake: %, 51-75%  Diet NPO    Anthropometric Measures:  Height: 165.1 cm (5' 5\")  Ideal Body Weight (IBW): 125 lbs (57 kg)       Current Body Weight: 51.3 kg (113 lb), 90.4 % IBW. Weight Source: Not Specified  Current BMI (kg/m2): 18.8        Weight Adjustment For: No Adjustment                 BMI Categories: Underweight (BMI less than 22) age over 65    Estimated Daily Nutrient Needs:  Energy Requirements Based On: Kcal/kg (28-33 kcals/kg)  Weight Used for Energy Requirements: Current (52 kg)  Energy (kcal/day): 1218-8899  Weight Used for Protein Requirements: Current 
Late entry:    Called to bedside post-bronchoscopy to address family concerns of possible under-sedation for Brochoscopy, done earlier in the day w/out specific procedural complications.     The patient/family initially requested transfer to OSH but after reviewing the case, hospitalization to date and examining the patient in the presence of the family and Director of Nursing, it was decided by all parties to remain in the hospital and complete inpatient tx as currently prescribed.     Discussed briefly w/ Pulmonology and more extensively in the evening w/ Attending of Record, Dr. Orozco.       
MD in to meet face to face with patient and family. Family and patient satisfied with pain management and care at this time. Patient and family expressed they are satisfied with care provided by several of the doctors directly involved in care. Plan of care and most recent labs are discussed with patient and family. Patient and family state they understand and are encouraged to continue to participate in plan of care.   
Occupational Therapy  Facility/Department: [facility] C4 PCU  Occupational Therapy Initial Assessment and Treatment    Name: Valery Salcido  : 1955  MRN: [id number]  Date of Service: 2024    Discharge Recommendations:  24 hour supervision or assist, Home with Home health OT  OT Equipment Recommendations  Equipment Needed: Yes  Mobility Devices: ADL Assistive Devices  ADL Assistive Devices: Shower Chair with back       Patient Diagnosis(es): The primary encounter diagnosis was Acute respiratory distress. Diagnoses of Lactic acidosis, COPD exacerbation (HCC), Metabolic acidosis, and Atypical pneumonia were also pertinent to this visit.  Past Medical History:  has a past medical history of COPD (chronic obstructive pulmonary disease) (HCC), DDD (degenerative disc disease), Fibromyalgia, Hyperlipidemia, and Sciatica.  Past Surgical History:  has a past surgical history that includes knee surgery; Hysterectomy; Cholecystectomy; Hand surgery; Tonsillectomy; other surgical history; Femur fracture surgery (Right, 2020); joint replacement; and bronchoscopy (N/A, 2024).           Assessment   Performance deficits / Impairments: Decreased functional mobility ;Decreased ADL status;Decreased strength;Decreased endurance;Decreased balance;Decreased high-level IADLs  Assessment: Pt supine in bed at start of session. Pt tolerates OT evaluation/ treatment session well. Pt is a 68 year old female who present to [facility] with SOB. Pt reports being independent with ADLs and functional mobility at baseline. Pt completes bed mobility independently. Pt completes sit to stands and functional mobility without AD with SBA. Pt with SOB after mobility and SpO2 88% on RA, pt takes seated rest break and quickly recovers to 95%. Pt was provided with energy conservation packet and educated on ways to position, pace, and prioritize activities in order to tolerate more activity. Pt is limited by endurance - OT recommends home with 24 
PULMONARY AND CRITICAL CARE INPATIENT NOTE        Valery Salcido   : 1955  MRN: [id number]     Admitting Physician: No admitting provider for patient encounter.  Attending Physician: Gopi Orozco MD  PCP: Vivian Jay MD    Admission: 2024   Date of Service: 2024    Chief Complaint   Patient presents with    Shortness of Breath     Pt reports SOB with exertion since Friday. Feels extremely winded with ambulation. Walked from the waiting area to triage. Heart rate increased to 137 however oxygen was at 95% upon arrival to the triage bay            ASSESSMENT & PLAN       68 y.o. pleasant  female patient with:      # Acute hypoxic respiratory failure, mild  # AECOPD  - Severe emphysema with mild obstruction and mild air trapping on Trelegy, albuterol, zafirlukast.  No oxygen needs at baseline  # Bronchial wall thickening with mucous plugging  # Protein energy malnutrition.    Likely secondary to advanced COPD  # Tobacco abuse history.    15 to 20 pack years.  Quit in   Keep n.p.o. for bronchoscopy today  Patient completed antibiotic course  Continue systemic steroids with persistent wheezing and respiratory difficulty  Dulera and Spiriva with as needed nebs  Mucinex and Mucomyst  Vibrating vest discontinued due to lack of tolerance from compression fractures/severe back pain  1 antitrypsin level and genotype follow-up  Blood gas did not qualify for noninvasive ventilation  Aspiration precautions  Target blood sugar between 140 and 180 mg/dL  DVT ppx measures.  PT/OT when patient is able to participate  Continue to wean oxygen with target 90 to 94%.  Rest/Exercise oxymetry before discharge to estimate oxygen needs.  Pulmonary rehab referral at discharge if patient qualifies.      LOS: Hospital Day: 9    Code:Full Code        Subjective/Objective           CC/Reason for Consult: COPD, pneumonia?  Bronch        HPI: 2024  68-year-old female patient with PMH of HLD, COPD, 
Patient IV infiltrate, she complains it is burning and the antibiotic \"kills\" her stomach and she is not able to handle it.  Patient is tearful, thrashing in bed crying and states if she needs another IV or a PICC line she must be put to sleep with anesthesia or she does not want it.     MD made aware.   
Patient arrived to PCU bed 431, Post bronchoscopic procedure, declined care and states. \" I do not want you people.\" Patient explained she is not happy with the procedure prior to coming to the unit. Patient is offered to speak to manager and MD notified.      Dr. Orozco on unit and came to speak to patient. Pain medication and anxiety medication is given as ordered. Patient states the medication is working for her. She appears comfortable, no s/s of distress are noted at this time.     Patient refused BIPAP. Oxygen is 98% on 3 LNC. And denies shortness of breath.    
Patient education provided regarding orders for urine testing for step and Legionella. Patient stated she thinks we are testing her for drugs in her system. This nurse read the orders for the specific test to be done on her urine and then patient said \"okay\".     Urine sent to lab per protocol and as ordered.       
Patient education regarding specimens needed for urine and nasal swab. Patient noted crying and upset.     Patient expressed not feeling well due to her anxiety level. Patient states she had her visit with her psychiatrist and the visit got her upset and crying.  Patient c/o that the psychiatrist was asking her if she wanted to kill the doctor or if she had a gun. Patient states she feels the psychiatrist was being abusive toward her and treating her like she abuses drugs or her anxiety medication. Patient expressed she has used valium or other anxiety medications in the past due to losing several family members and she states \"I did that in the past and I am not angry.\"  Patient was upset that the psychiatrist asked her if she has suicidal thoughts. '    Patient also states she is upset toward lab person and said \"she stabbed my hand\" making a motion up and straight down motion on the back of her hand. Patient appeared and verbalized anxiety and paranoid.      Patient expressed verbally she feels everyone is out to get her or mistreat her because she is sick and not able to fight.  Patient     Patient is offered emotional support, encouraged her something that brings her chicho or peace. Patient is given medications as ordered and available to her.    Patient reports medication is effective.   
Patient family at bedside and request to speak to MD in regards to transferring to [facility] for continuation of care. MD is made aware of request to speak to MD in person regarding transfer request.     Patient appears without distress and no complaints of pain.   
Patient received from floor, awake and oriented, c/o chronic back pain from home and rib pain from coughing since admission to hospital.  VSS.  Diminished chest air entry throughout with some scattered rhonchi.  Will administer floor duo neb.  Fiance at beside.  Consent signed on floor.    
Patient stated her IV is leaking and painful. Site is noted with small amount of leaking clear fluid, no redness, no swelling, no bruising noted. Patient is notified that I will let the doctor know and see if we need another IV. Patient stated \"This is life or death and I am trying to live here\". Patient IV is taken out, patient yelled and screamed and asked if we pulled out her vein. Catheter is intact, no bleeding from site, Band-Aid is applied. Patient requesting pain medication due to her IV taken out.  Pain medication not due as it is given round the clock as per order. Non-pharmacological interventions provided such as repositioned, soda pop, family support, lights dim and bundled, as well as emotional support.     Patient breathing is improved and oxygen is titrated down to 1 liter NC for over 24 hours,Oxygen sats are above 97%.  Discharge teaching included plan of care  , walk test to see if she needs home 02 and medication review.     MD made aware, okay to leave IV out, patient will be switched to oral antibiotic.   
Patient states that she has T6 & T7 fractures and vest causes severe pain.  Held vest at this time.  
Patient without IV, MD aware and plan for oral antibiotic, oral antifungal to continue. Patient plan of care reviewed and she got very upset about going home or doing a walking test to see if she can come off oxygen. She gets panic when talking about going home or taking the oxygen off . She is on 1 liter NC and sats are well above 95%. Her color looks much improved, cap refil is brisk.  Patient is offered emotional support, tearful and crying. She states she is very ill and anxious and in pain and she is not ready to discuss her plan of care or goals for improvement at this time.   
Physical Therapy  [address]  Physical Therapy Initial Assessment    Name: Valery Salcido  : 1955  MRN: [id number]  Date of Service: 2024    Discharge Recommendations:  24 hour supervision or assist, Home with Home health PT   PT Equipment Recommendations  Equipment Needed: Yes  Mobility Devices: Canes  Cane: Straight Cane      Patient Diagnosis(es): The primary encounter diagnosis was Acute respiratory distress. Diagnoses of Lactic acidosis, COPD exacerbation (HCC), Metabolic acidosis, and Atypical pneumonia were also pertinent to this visit.  Past Medical History:  has a past medical history of COPD (chronic obstructive pulmonary disease) (HCC), DDD (degenerative disc disease), Fibromyalgia, Hyperlipidemia, and Sciatica.  Past Surgical History:  has a past surgical history that includes knee surgery; Hysterectomy; Cholecystectomy; Hand surgery; Tonsillectomy; other surgical history; Femur fracture surgery (Right, 2020); joint replacement; and bronchoscopy (N/A, 2024).    Assessment   Body Structures, Functions, Activity Limitations Requiring Skilled Therapeutic Intervention: Decreased balance;Decreased strength;Decreased safe awareness;Decreased body mechanics;Decreased functional mobility ;Decreased coordination;Decreased endurance  Assessment: Pt is awake and agreeable to therapy session. Pt is limited by balance deficits and decreased endurance. She requires stand by assistance with ambulation in hallway up to 80' and demos one loss of balance requiring Ponce to correct. She states that she occasionally walks with SPC but has lost hers, recommend issuing a new cane d/t balance deficits. She is on room air upon therapist arrival but desats to 88% SpO2 following walk. Discussed energy conservation and fall prevention techniques. Recommend home PT upon discharge to progress towards prior level of function and address remaining balance and mobility deficits.  Therapy 
Pt awake, on overnight pulse ox monitor, no complications   05/16/24 0018   Oxygen Therapy/Pulse Ox   O2 Therapy Oxygen   O2 Device Nasal cannula   O2 Flow Rate (L/min) 2 L/min   Pulse 89   SpO2 96 %     .  
Pt okay to transport, new oxygen placed, verified with transporter. Pt placed on 3L NC.     PIV placed left AC. Pt to be given pain medication by floor RN, where respiratory status can be monitored closely.      RT contacted to updated on transfer to . Second attempt to call report to Gen BEJARANO.  
Pt reports 1 episode of emesis after eating turkey sandwich, denies offer for administration of PRN zofran, says nausea feeling subsided  
Pt transferred to PACU, alert and awake. Pt highly anxious, angry, and tearful. PIV positional and sluggish, pt refused change at this time.     Pt breathing well on 3L NC, declines EPAP/IPAP at this time, placed into standby mode by RT.    Pt c/o 10/10 pain, highly anxious, states she was awake during procedure. Repeated attempts to calm patient, \"they didn't cre back there, why would you care now\" and \"I'm going to get a \".    All medications currently ordered as PO, pt remains NPO until 1620 d/t lidocaine used during procedure.    Dr. Ellis contacted, see MAR.     Pt amenable to IV replacement at this time.    
Report called to C4 Charge RN, questions and concerns addressed. Pt NPO until 1620.     Pt and fiance updated on room transfer, personal items sent to new room.   
WALK TEST    Patient ambulated per MD orders:  At rest in bed without O2 patient sats 93%,   Standing at bedside without O2 patient sats 93%.  Ambulating in hallway aprox 100 feet, patient remains at 95%.     Did not require o2 for ambulation     Pt did become tachypneic and tachycardic with ambulation, sustained in 120s while walking slowsy aprox 100 feet in hallway. Respirations were deep, and rate 24 per minute.       Rn educated pt that her o2 sats maintain without help of oxygen, however she remains adamant that she requires it. Says \"yareli just wants me to go home and die\". RN provided emotional support, education on plan of care. No further needs identified  
51.7 kg (113 lb 15.7 oz)   SpO2 96%   BMI 18.97 kg/m²     Diet: ADULT DIET; Regular  ADULT ORAL NUTRITION SUPPLEMENT; Lunch, Dinner, Breakfast; Standard High Calorie/High Protein Oral Supplement  ADULT ORAL NUTRITION SUPPLEMENT; Lunch, Dinner; Other Oral Supplement; Cottage cheese please  ADULT ORAL NUTRITION SUPPLEMENT; Breakfast; Other Oral Supplement; Yogurt please  DVT Prophylaxis: [x]PPx LMWH  []SQ Heparin  []IPC/SCDs  []Eliquis  []Xarelto  []Coumadin  []Other -      Code status: Full Code  PT/OT Eval Status:   [x]NOT yet ordered  []Ordered and Pending   []Seen with Recommendations for:  []Home independently  []Home w/ assist  []HHC  []SNF  []Acute Rehab    Anticipated Discharge Day/Date:  2-4 days    Anticipated Discharge Location: [x]Home  []HHC  []SNF  []Acute Rehab  []ECF  []LTAC  []Hospice  []Other -      Consults:      IP CONSULT TO HOSPITALIST  IP CONSULT TO SPIRITUAL SERVICES  IP CONSULT TO PULMONOLOGY      This patient has a high likelihood of being discharged tomorrow and is appropriate for A1 Discharge Unit in AM pending clinical course overnight: []Yes  [x]No    ------------------------------------------------------------------------------------------------------------------------------------------------------------------------    MDM    Moderate    Medications:  Personally reviewed in detail in conjunction w/ labs as documented for evidence of drug toxicity.     Infusion Medications    sodium chloride 25 mL/hr at 05/13/24 0840     Scheduled Medications    gabapentin  600 mg Oral TID    acetylcysteine  600 mg Inhalation BID RT    guaiFENesin  600 mg Oral BID    lidocaine  1 patch TransDERmal Daily    atorvastatin  20 mg Oral Daily    pantoprazole  40 mg Oral Daily    montelukast  10 mg Oral Nightly    methylPREDNISolone  40 mg IntraVENous BID    sodium chloride flush  5-40 mL IntraVENous 2 times per day    enoxaparin  40 mg SubCUTAneous Daily    mometasone-formoterol  2 puff Inhalation BID RT    
Supplement    Anthropometric Measures:  Height: 165.1 cm (5' 5\")  Ideal Body Weight (IBW): 125 lbs (57 kg)       Current Body Weight: 51.3 kg (113 lb), 90.4 % IBW. Weight Source: Not Specified  Current BMI (kg/m2): 18.8        Weight Adjustment For: No Adjustment                 BMI Categories: Underweight (BMI less than 22) age over 65    Estimated Daily Nutrient Needs:  Energy Requirements Based On: Kcal/kg (28-33 kcals/kg)  Weight Used for Energy Requirements: Current (52 kg)  Energy (kcal/day): 1110-9645  Weight Used for Protein Requirements: Current (1.2-1.5 g/kg)  Protein (g/day): 62-73 g  Method Used for Fluid Requirements: 1 ml/kcal  Fluid (ml/day):      Nutrition Diagnosis:   Severe malnutrition related to inadequate protein-energy intake as evidenced by poor intake prior to admission, Criteria as identified in malnutrition assessment, severe muscle loss, moderate loss of subcutaneous fat, weight loss, intake 0-25%    Nutrition Interventions:   Food and/or Nutrient Delivery: Continue Current Diet, Continue Oral Nutrition Supplement, Discontinue Oral Nutrition Supplement, Start Oral Nutrition Supplement  Nutrition Education/Counseling: Education not appropriate  Coordination of Nutrition Care: Continue to monitor while inpatient       Goals:  Previous Goal Met: No Progress toward Goal(s)  Goals: PO intake 50% or greater, prior to discharge       Nutrition Monitoring and Evaluation:   Behavioral-Environmental Outcomes: None Identified  Food/Nutrient Intake Outcomes: Food and Nutrient Intake, Supplement Intake  Physical Signs/Symptoms Outcomes: Weight, Nutrition Focused Physical Findings, Biochemical Data    Discharge Planning:    Continue current diet, Continue Oral Nutrition Supplement     Rhonda Pineda, MS, RD, LD  Contact: Office: [phone]; Thai: [phone]      
Inhalation BID RT    guaiFENesin  600 mg Oral BID    lidocaine  1 patch TransDERmal Daily    atorvastatin  20 mg Oral Daily    montelukast  10 mg Oral Nightly    methylPREDNISolone  40 mg IntraVENous BID    sodium chloride flush  5-40 mL IntraVENous 2 times per day    enoxaparin  40 mg SubCUTAneous Daily    mometasone-formoterol  2 puff Inhalation BID RT    And    tiotropium  2 puff Inhalation Daily RT     PRN Meds: HYDROcodone 5 mg - acetaminophen, fentanNYL, traMADol, albuterol, LORazepam, benzonatate, guaiFENesin-dextromethorphan, sodium chloride (Inhalant), hydrOXYzine HCl, tiZANidine, traZODone, sodium chloride flush, sodium chloride, ondansetron **OR** ondansetron, polyethylene glycol, acetaminophen **OR** acetaminophen     Labs:  Personally reviewed and interpreted for clinical significance.     No results for input(s): \"WBC\", \"HGB\", \"HCT\", \"PLT\" in the last 72 hours.    No results for input(s): \"NA\", \"K\", \"CL\", \"CO2\", \"BUN\", \"CREATININE\", \"CALCIUM\", \"MG\", \"PHOS\" in the last 72 hours.    No results for input(s): \"PROBNP\", \"TROPHS\" in the last 72 hours.    No results for input(s): \"LABA1C\" in the last 72 hours.  No results for input(s): \"AST\", \"ALT\", \"BILIDIR\", \"BILITOT\", \"ALKPHOS\" in the last 72 hours.    No results for input(s): \"INR\", \"LACTA\", \"TSH\" in the last 72 hours.      Urine Cultures: No results found for: \"LABURIN\"  Blood Cultures:   Lab Results   Component Value Date/Time    BC No Growth after 4 days of incubation. 05/08/2024 11:47 AM     Lab Results   Component Value Date/Time    BLOODCULT2 No Growth after 4 days of incubation. 05/08/2024 12:44 PM     Organism:   No results found for: \"ORG\"      Gopi Orozco MD  
Problems (any 1)  [x] Acute/Chronic Illness/injury posing threat to life or bodily function: Acute respiratory failure  [] Severe exacerbation of chronic illness:    ---------------------------------------------------------------------  B. Risk of Treatment (any 1)   [x] Drugs/treatments that require intensive monitoring for toxicity include:    [x] IV ABX requiring serial renal monitoring for nephrotoxicity:   Ceftriaxone  [] Post-Cath/Contrast study requiring serial renal monitoring for Contrast Induced Nephropathy  [] IV Narcotic analgesia for ADR   [] Aggressive IV diuresis requiring serial monitoring for renal impairment and electrolyte derangements  [] Hypertonic Saline requiring serial renal monitoring for appropriate electrolyte correction rate   [] Critical electrolyte abnormalities requiring IV replacement and close serial monitoring  [] Insulin - monitoring FSBS for Hypoglycemic ADR  [] Anticoagulation requiring close serial monitoring and dose adjustments at high risk of ADR   [] HD requiring close serial monitoring of electrolytes and fluid status  [] Other -  [] Change in code status:    [] Decision to escalate care:    [] Major surgery/procedure with associated risk factors:    ----------------------------------------------------------------------  C. Data (any 2)  [] Discussed management of the case with consultants as follows:    [x] Discussed the discharge plan in detail with case mgt including timing/barriers to discharge, need for support services and placement decision   [] Imaging personally reviewed and interpreted, includes:   [] Telemetry monitoring w/    [] Data Review (any 3)  [] Collateral history obtained from:    [] All available Consultant notes from yesterday/today were reviewed  [x] All current labs were reviewed and interpreted for clinical significance   [x] Appropriate follow-up labs were ordered      Medications:  Personally reviewed in detail in conjunction w/ labs as documented 
derangements  [] Critical electrolyte abnormalities requiring IV replacement and close serial monitoring  [] Insulin - monitoring serial FSBS for Hypoglycemic adverse drug reaction  [] Anticoagulation requiring serial monitoring of coagulation factors  [] Other -   [] Change in code status:    [] Decision to escalate care:    [x] Major surgery/procedure with associated risk factors:  Bronchoscopy  ----------------------------------------------------------------------  C. Data (any 2)  [x] Discussed current management and discharge planning options with Case Management.  [] Discussed management of the case with:    [] Telemetry personally reviewed and interpreted as documented above    [] Imaging personally reviewed and interpreted, includes:    [x] Data Review (any 3)  [x] All available Consultant notes from yesterday/today were reviewed  [x] All current labs were reviewed and interpreted for clinical significance   [x] Appropriate follow-up labs were ordered  [] Collateral history obtained from:        Medications:  Personally reviewed in detail in conjunction w/ labs as documented for evidence of drug toxicity.     Infusion Medications    sodium chloride 25 mL/hr at 05/13/24 0840     Scheduled Medications    pantoprazole  40 mg IntraVENous Daily    gabapentin  600 mg Oral TID    acetylcysteine  600 mg Inhalation BID RT    guaiFENesin  600 mg Oral BID    lidocaine  1 patch TransDERmal Daily    atorvastatin  20 mg Oral Daily    montelukast  10 mg Oral Nightly    methylPREDNISolone  40 mg IntraVENous BID    sodium chloride flush  5-40 mL IntraVENous 2 times per day    enoxaparin  40 mg SubCUTAneous Daily    mometasone-formoterol  2 puff Inhalation BID RT    And    tiotropium  2 puff Inhalation Daily RT     PRN Meds: traMADol, albuterol, LORazepam, benzonatate, guaiFENesin-dextromethorphan, sodium chloride (Inhalant), hydrOXYzine HCl, tiZANidine, traZODone, sodium chloride flush, sodium chloride, ondansetron **OR** 
for evidence of drug toxicity.     Infusion Medications    sodium chloride 25 mL/hr at 05/13/24 0840     Scheduled Medications    senna  1 tablet Oral BID    [START ON 5/19/2024] predniSONE  40 mg Oral Daily    vitamin B-12  250 mcg Oral Daily    Vitamin D  1,000 Units Oral Daily    pantoprazole  40 mg IntraVENous Daily    gabapentin  600 mg Oral TID    acetylcysteine  600 mg Inhalation BID RT    guaiFENesin  600 mg Oral BID    lidocaine  1 patch TransDERmal Daily    atorvastatin  20 mg Oral Daily    montelukast  10 mg Oral Nightly    sodium chloride flush  5-40 mL IntraVENous 2 times per day    enoxaparin  40 mg SubCUTAneous Daily    mometasone-formoterol  2 puff Inhalation BID RT    And    tiotropium  2 puff Inhalation Daily RT     PRN Meds: magnesium hydroxide, HYDROcodone 5 mg - acetaminophen, fentanNYL, traMADol, albuterol, LORazepam, benzonatate, guaiFENesin-dextromethorphan, sodium chloride (Inhalant), hydrOXYzine HCl, tiZANidine, traZODone, sodium chloride flush, sodium chloride, ondansetron **OR** ondansetron, polyethylene glycol, acetaminophen **OR** acetaminophen     Labs:  Personally reviewed and interpreted for clinical significance.     Recent Labs     05/18/24  0933   WBC 16.5*   HGB 14.8   HCT 45.1        Recent Labs     05/18/24  0933   *   K 4.0   CL 97*   CO2 21   BUN 24*   CREATININE <0.5*   CALCIUM 9.2     No results for input(s): \"PROBNP\", \"TROPHS\" in the last 72 hours.    No results for input(s): \"LABA1C\" in the last 72 hours.  No results for input(s): \"AST\", \"ALT\", \"BILIDIR\", \"BILITOT\", \"ALKPHOS\" in the last 72 hours.    No results for input(s): \"INR\", \"LACTA\", \"TSH\" in the last 72 hours.      Urine Cultures: No results found for: \"LABURIN\"  Blood Cultures:   Lab Results   Component Value Date/Time    BC No Growth after 4 days of incubation. 05/08/2024 11:47 AM     Lab Results   Component Value Date/Time    BLOODCULT2 No Growth after 4 days of incubation. 05/08/2024 12:44 PM 
results found for: \"LABURIN\"  Blood Cultures:   Lab Results   Component Value Date/Time    BC No Growth after 4 days of incubation. 05/08/2024 11:47 AM     Lab Results   Component Value Date/Time    BLOODCULT2 No Growth after 4 days of incubation. 05/08/2024 12:44 PM     Organism:   Lab Results   Component Value Date/Time    ORG Aspergillus fumigatus 05/16/2024 02:30 PM         Jefe Oliveira MD  
   05/14/24 1942 05/15/24 0800 05/15/24 0823 05/15/24 0842   BP:  (!) 152/79     Pulse:  98     Resp:  16  16   Temp:  98.6 °F (37 °C)     TempSrc:  Oral     SpO2: 97% 94% 96%    Weight:       Height:            No intake or output data in the 24 hours ending 05/15/24 1444  Net IO Since Admission: -4,450 mL [05/15/24 1444]        Physical Exam:  General appearance: In mild distress  HEENT: Moist mucus membranes.  Cardiac: Normal S1 and S2.  Lungs: Very poor air entry bilaterally with expiratory wheezes and rhonchi  Abdomen: Soft.  Back & Extremities: Symmetric pulses with good perfusion.  Neurological: No focal deficit..       ________________________________________________________  Electronically signed by:  Olivier Ellis MD,FACP    5/15/2024    2:44 PM.     [facility]  [address], [place] [address]   Phone (office): [phone]

## 2024-05-23 LAB
ASPERGILLUS AB SER QL ID: NOT DETECTED
B DERMAT AB SER QL ID: NOT DETECTED
COCCIDIOIDES AB SPEC QL ID: NOT DETECTED
FUNGUS SPEC CULT: ABNORMAL
H CAPSUL AB TITR SER ID: NOT DETECTED {TITER}
LOEFFLER MB STN SPEC: ABNORMAL
ORGANISM: ABNORMAL

## 2024-05-28 LAB
ACID FAST STN SPEC QL: NORMAL
MYCOBACTERIUM SPEC CULT: NORMAL

## 2024-05-29 ENCOUNTER — TELEPHONE (OUTPATIENT)
Dept: INFECTIOUS DISEASES | Age: 69
End: 2024-05-29

## 2024-05-29 NOTE — TELEPHONE ENCOUNTER
Called patient to schedule 6 week follow up per Dr Donaldson's request. Patient states that she has a follow up appointment on 6/7 with a [facility] ID MD.  SH states all her other Mds are at [facility] and her boyfriend freaked out and brought her to [facility] when she got admitted.     Dr Donaldson made aware.    Detail Level: Detailed

## 2024-06-04 LAB
ACID FAST STN SPEC QL: NORMAL
MYCOBACTERIUM SPEC CULT: NORMAL

## 2024-06-11 LAB
ACID FAST STN SPEC QL: NORMAL
MYCOBACTERIUM SPEC CULT: NORMAL

## 2024-06-17 LAB
FUNGUS SPEC CULT: ABNORMAL
LOEFFLER MB STN SPEC: ABNORMAL
ORGANISM: ABNORMAL

## 2024-06-18 LAB
ACID FAST STN SPEC QL: NORMAL
MYCOBACTERIUM SPEC CULT: NORMAL

## 2024-06-19 LAB
GALACTOMANNAN AG SERPL IA-ACNC: 0.12
GALACTOMANNAN AG SERPL QL IA: NEGATIVE

## 2024-06-25 LAB
ACID FAST STN SPEC QL: NORMAL
MYCOBACTERIUM SPEC CULT: NORMAL

## 2024-07-02 LAB
ACID FAST STN SPEC QL: NORMAL
MYCOBACTERIUM SPEC CULT: NORMAL

## 2024-08-17 SDOH — HEALTH STABILITY: PHYSICAL HEALTH: ON AVERAGE, HOW MANY MINUTES DO YOU ENGAGE IN EXERCISE AT THIS LEVEL?: 30 MIN

## 2024-08-17 SDOH — HEALTH STABILITY: PHYSICAL HEALTH: ON AVERAGE, HOW MANY DAYS PER WEEK DO YOU ENGAGE IN MODERATE TO STRENUOUS EXERCISE (LIKE A BRISK WALK)?: 2 DAYS

## 2024-08-20 ENCOUNTER — OFFICE VISIT (OUTPATIENT)
Dept: ORTHOPEDIC SURGERY | Age: 69
End: 2024-08-20
Payer: MEDICARE

## 2024-08-20 VITALS — BODY MASS INDEX: 19.66 KG/M2 | HEIGHT: 65 IN | WEIGHT: 118 LBS

## 2024-08-20 DIAGNOSIS — M25.561 ACUTE PAIN OF RIGHT KNEE: ICD-10-CM

## 2024-08-20 DIAGNOSIS — Z96.651 STATUS POST TOTAL RIGHT KNEE REPLACEMENT: Primary | ICD-10-CM

## 2024-08-20 PROCEDURE — G8427 DOCREV CUR MEDS BY ELIG CLIN: HCPCS | Performed by: ORTHOPAEDIC SURGERY

## 2024-08-20 PROCEDURE — 1090F PRES/ABSN URINE INCON ASSESS: CPT | Performed by: ORTHOPAEDIC SURGERY

## 2024-08-20 PROCEDURE — 99203 OFFICE O/P NEW LOW 30 MIN: CPT | Performed by: ORTHOPAEDIC SURGERY

## 2024-08-20 PROCEDURE — 1036F TOBACCO NON-USER: CPT | Performed by: ORTHOPAEDIC SURGERY

## 2024-08-20 PROCEDURE — 1123F ACP DISCUSS/DSCN MKR DOCD: CPT | Performed by: ORTHOPAEDIC SURGERY

## 2024-08-20 PROCEDURE — G8420 CALC BMI NORM PARAMETERS: HCPCS | Performed by: ORTHOPAEDIC SURGERY

## 2024-08-20 PROCEDURE — 3017F COLORECTAL CA SCREEN DOC REV: CPT | Performed by: ORTHOPAEDIC SURGERY

## 2024-08-20 PROCEDURE — G8400 PT W/DXA NO RESULTS DOC: HCPCS | Performed by: ORTHOPAEDIC SURGERY

## 2024-08-20 NOTE — PROGRESS NOTES
KNEE VISIT      HISTORY OF PRESENT ILLNESS    Valery Salcido is a 68 y.o. female who presents for evaluation of right knee pain.  She had a knee replacement performed several years ago.  Back in July 2020, she fell and sustained a periprosthetic fracture of distal femur knee replacement region.  She was treated with a Kimmy plate, and healed well.  Over the last several weeks she has had increasing pain swelling warmth to the leg.  She feels the lateral side of her right knee where she says the plate is.  She has had a hard time sleeping with the because of the pain.  She grades pain 5/10 and it throbs.  She also had an issue back about 3 months ago when she had of fungal infection and lost 30 pounds.    ROS    Well-documented patient history form dated 8/20/2024 all other ROS negative except for above.    Past Surgical history    Past Surgical History:   Procedure Laterality Date    BRONCHOSCOPY N/A 5/16/2024    BRONCHOSCOPY DIAGNOSTIC OR CELL WASH ONLY performed by Olivier Ellis MD at [facility] ENDOSCOPY    CHOLECYSTECTOMY      FEMUR FRACTURE SURGERY Right 7/6/2020    RIGHT FEMUR OPEN REDUCTION INTERNAL FIXATION performed by Perla Rose DO at [facility] OR    HAND SURGERY      HYSTERECTOMY (CERVIX STATUS UNKNOWN)      JOINT REPLACEMENT      KNEE SURGERY      OTHER SURGICAL HISTORY      RIGHT FEMUR OPEN REDUCTION INTERNAL FIXATION (Right    TONSILLECTOMY         PAST MEDICAL    Past Medical History:   Diagnosis Date    COPD (chronic obstructive pulmonary disease) ([facility])     DDD (degenerative disc disease)     Fibromyalgia     Hyperlipidemia     Sciatica        Allergies    No Known Allergies    Meds    Current Outpatient Medications   Medication Sig Dispense Refill    cetirizine (ZYRTEC) 10 MG tablet Take 1 tablet by mouth daily      fluticasone (VERAMYST) 27.5 MCG/SPRAY nasal spray 2 sprays by Each Nostril route daily      linaCLOtide (LINZESS) 72 MCG CAPS capsule Take 1 capsule by mouth every morning (before

## 2024-08-21 ENCOUNTER — TELEPHONE (OUTPATIENT)
Dept: ORTHOPEDIC SURGERY | Age: 69
End: 2024-08-21

## 2024-08-21 NOTE — TELEPHONE ENCOUNTER
Left voicemail for patient that their BONE SCAN has been authorized and that they can call and schedule scan at their convenience. Also told them that they can call and schedule a f/u with Dr. Gonzalez once they have BONE SCAN scheduled, leaving at least 2-3 days for our office to receive their results.

## 2024-09-10 ENCOUNTER — HOSPITAL ENCOUNTER (OUTPATIENT)
Dept: CARDIAC REHAB | Age: 69
Setting detail: THERAPIES SERIES
Discharge: HOME | End: 2024-09-10

## 2024-09-10 ASSESSMENT — PULMONARY FUNCTION TESTS
FEV1/FVC: 68
FVC (LITERS): 3.77
FEV1 (%PREDICTED): 78

## 2024-09-25 ENCOUNTER — APPOINTMENT (OUTPATIENT)
Dept: GENERAL RADIOLOGY | Age: 69
End: 2024-09-25
Payer: MEDICARE

## 2024-09-25 ENCOUNTER — HOSPITAL ENCOUNTER (EMERGENCY)
Age: 69
Discharge: HOME | End: 2024-09-25
Attending: EMERGENCY MEDICINE
Payer: MEDICARE

## 2024-09-25 VITALS
TEMPERATURE: 97.8 F | RESPIRATION RATE: 18 BRPM | SYSTOLIC BLOOD PRESSURE: 123 MMHG | OXYGEN SATURATION: 96 % | DIASTOLIC BLOOD PRESSURE: 79 MMHG | HEART RATE: 71 BPM

## 2024-09-25 DIAGNOSIS — J44.1 COPD EXACERBATION (HCC): Primary | ICD-10-CM

## 2024-09-25 DIAGNOSIS — I49.3 PVC'S (PREMATURE VENTRICULAR CONTRACTIONS): ICD-10-CM

## 2024-09-25 DIAGNOSIS — R00.2 PALPITATIONS: ICD-10-CM

## 2024-09-25 LAB
ALBUMIN SERPL-MCNC: 4.1 G/DL (ref 3.4–5)
ALBUMIN/GLOB SERPL: 2 {RATIO} (ref 1.1–2.2)
ALP SERPL-CCNC: 106 U/L (ref 40–129)
ALT SERPL-CCNC: <5 U/L (ref 10–40)
ANION GAP SERPL CALCULATED.3IONS-SCNC: 11 MMOL/L (ref 3–16)
AST SERPL-CCNC: 9 U/L (ref 15–37)
BASOPHILS # BLD: 0 K/UL (ref 0–0.2)
BASOPHILS NFR BLD: 0.2 %
BILIRUB SERPL-MCNC: 0.4 MG/DL (ref 0–1)
BUN SERPL-MCNC: 14 MG/DL (ref 7–20)
CALCIUM SERPL-MCNC: 9.5 MG/DL (ref 8.3–10.6)
CHLORIDE SERPL-SCNC: 103 MMOL/L (ref 99–110)
CO2 SERPL-SCNC: 26 MMOL/L (ref 21–32)
CREAT SERPL-MCNC: 0.7 MG/DL (ref 0.6–1.2)
D-DIMER QUANTITATIVE: 0.33 UG/ML FEU (ref 0–0.6)
DEPRECATED RDW RBC AUTO: 14.4 % (ref 12.4–15.4)
EKG ATRIAL RATE: 84 BPM
EKG DIAGNOSIS: NORMAL
EKG P AXIS: 76 DEGREES
EKG P-R INTERVAL: 120 MS
EKG Q-T INTERVAL: 368 MS
EKG QRS DURATION: 94 MS
EKG QTC CALCULATION (BAZETT): 434 MS
EKG R AXIS: 77 DEGREES
EKG T AXIS: 49 DEGREES
EKG VENTRICULAR RATE: 84 BPM
EOSINOPHIL # BLD: 0.2 K/UL (ref 0–0.6)
EOSINOPHIL NFR BLD: 1.9 %
GFR SERPLBLD CREATININE-BSD FMLA CKD-EPI: >90 ML/MIN/{1.73_M2}
GLUCOSE SERPL-MCNC: 114 MG/DL (ref 70–99)
HCT VFR BLD AUTO: 37.8 % (ref 36–48)
HGB BLD-MCNC: 12.8 G/DL (ref 12–16)
LYMPHOCYTES # BLD: 2.5 K/UL (ref 1–5.1)
LYMPHOCYTES NFR BLD: 29.9 %
MCH RBC QN AUTO: 28.8 PG (ref 26–34)
MCHC RBC AUTO-ENTMCNC: 33.9 G/DL (ref 31–36)
MCV RBC AUTO: 84.9 FL (ref 80–100)
MONOCYTES # BLD: 0.8 K/UL (ref 0–1.3)
MONOCYTES NFR BLD: 9.4 %
NEUTROPHILS # BLD: 4.9 K/UL (ref 1.7–7.7)
NEUTROPHILS NFR BLD: 58.6 %
PLATELET # BLD AUTO: 294 K/UL (ref 135–450)
PMV BLD AUTO: 7.1 FL (ref 5–10.5)
POTASSIUM SERPL-SCNC: 4.1 MMOL/L (ref 3.5–5.1)
PROT SERPL-MCNC: 6.2 G/DL (ref 6.4–8.2)
RBC # BLD AUTO: 4.45 M/UL (ref 4–5.2)
SODIUM SERPL-SCNC: 140 MMOL/L (ref 136–145)
TROPONIN, HIGH SENSITIVITY: 11 NG/L (ref 0–14)
TROPONIN, HIGH SENSITIVITY: 12 NG/L (ref 0–14)
WBC # BLD AUTO: 8.4 K/UL (ref 4–11)

## 2024-09-25 PROCEDURE — 6370000000 HC RX 637 (ALT 250 FOR IP): Performed by: EMERGENCY MEDICINE

## 2024-09-25 PROCEDURE — 71046 X-RAY EXAM CHEST 2 VIEWS: CPT

## 2024-09-25 PROCEDURE — 36415 COLL VENOUS BLD VENIPUNCTURE: CPT

## 2024-09-25 PROCEDURE — 96374 THER/PROPH/DIAG INJ IV PUSH: CPT

## 2024-09-25 PROCEDURE — 2580000003 HC RX 258: Performed by: EMERGENCY MEDICINE

## 2024-09-25 PROCEDURE — 80053 COMPREHEN METABOLIC PANEL: CPT

## 2024-09-25 PROCEDURE — 93005 ELECTROCARDIOGRAM TRACING: CPT | Performed by: EMERGENCY MEDICINE

## 2024-09-25 PROCEDURE — 85025 COMPLETE CBC W/AUTO DIFF WBC: CPT

## 2024-09-25 PROCEDURE — 84484 ASSAY OF TROPONIN QUANT: CPT

## 2024-09-25 PROCEDURE — 93010 ELECTROCARDIOGRAM REPORT: CPT | Performed by: INTERNAL MEDICINE

## 2024-09-25 PROCEDURE — 2500000003 HC RX 250 WO HCPCS: Performed by: EMERGENCY MEDICINE

## 2024-09-25 PROCEDURE — 99285 EMERGENCY DEPT VISIT HI MDM: CPT

## 2024-09-25 PROCEDURE — 96375 TX/PRO/DX INJ NEW DRUG ADDON: CPT

## 2024-09-25 PROCEDURE — 6360000002 HC RX W HCPCS: Performed by: EMERGENCY MEDICINE

## 2024-09-25 PROCEDURE — 85379 FIBRIN DEGRADATION QUANT: CPT

## 2024-09-25 RX ORDER — METOPROLOL SUCCINATE 25 MG/1
25 TABLET, EXTENDED RELEASE ORAL DAILY
Qty: 14 TABLET | Refills: 0 | Status: SHIPPED | OUTPATIENT
Start: 2024-09-25 | End: 2024-10-09

## 2024-09-25 RX ORDER — PREDNISONE 50 MG/1
50 TABLET ORAL DAILY
Qty: 4 TABLET | Refills: 0 | Status: SHIPPED | OUTPATIENT
Start: 2024-09-25 | End: 2024-09-29

## 2024-09-25 RX ORDER — METOPROLOL TARTRATE 1 MG/ML
2.5 INJECTION, SOLUTION INTRAVENOUS ONCE
Status: COMPLETED | OUTPATIENT
Start: 2024-09-25 | End: 2024-09-25

## 2024-09-25 RX ORDER — 0.9 % SODIUM CHLORIDE 0.9 %
1000 INTRAVENOUS SOLUTION INTRAVENOUS ONCE
Status: COMPLETED | OUTPATIENT
Start: 2024-09-25 | End: 2024-09-25

## 2024-09-25 RX ORDER — IPRATROPIUM BROMIDE AND ALBUTEROL SULFATE 2.5; .5 MG/3ML; MG/3ML
1 SOLUTION RESPIRATORY (INHALATION) ONCE
Status: COMPLETED | OUTPATIENT
Start: 2024-09-25 | End: 2024-09-25

## 2024-09-25 RX ORDER — HYDROCODONE BITARTRATE AND ACETAMINOPHEN 5; 325 MG/1; MG/1
1 TABLET ORAL ONCE
Status: COMPLETED | OUTPATIENT
Start: 2024-09-25 | End: 2024-09-25

## 2024-09-25 RX ORDER — METOPROLOL TARTRATE 1 MG/ML
5 INJECTION, SOLUTION INTRAVENOUS ONCE
Status: DISCONTINUED | OUTPATIENT
Start: 2024-09-25 | End: 2024-09-25

## 2024-09-25 RX ADMIN — IPRATROPIUM BROMIDE AND ALBUTEROL SULFATE 1 DOSE: 2.5; .5 SOLUTION RESPIRATORY (INHALATION) at 11:19

## 2024-09-25 RX ADMIN — HYDROCODONE BITARTRATE AND ACETAMINOPHEN 1 TABLET: 5; 325 TABLET ORAL at 11:39

## 2024-09-25 RX ADMIN — SODIUM CHLORIDE 1000 ML: 9 INJECTION, SOLUTION INTRAVENOUS at 11:20

## 2024-09-25 RX ADMIN — WATER 125 MG: 1 INJECTION INTRAMUSCULAR; INTRAVENOUS; SUBCUTANEOUS at 11:32

## 2024-09-25 RX ADMIN — METOPROLOL TARTRATE 2.5 MG: 5 INJECTION INTRAVENOUS at 11:31

## 2024-09-25 ASSESSMENT — PAIN SCALES - GENERAL
PAINLEVEL_OUTOF10: 8
PAINLEVEL_OUTOF10: 10

## 2024-09-25 ASSESSMENT — ENCOUNTER SYMPTOMS
FACIAL SWELLING: 0
NAUSEA: 0
COUGH: 1
STRIDOR: 0
SHORTNESS OF BREATH: 1
WHEEZING: 1
TROUBLE SWALLOWING: 0
ABDOMINAL PAIN: 0
CHEST TIGHTNESS: 1
VOICE CHANGE: 0
COLOR CHANGE: 0
VOMITING: 0

## 2024-09-25 ASSESSMENT — PAIN DESCRIPTION - LOCATION
LOCATION: BACK;CHEST
LOCATION: BACK

## 2024-09-25 ASSESSMENT — PAIN - FUNCTIONAL ASSESSMENT: PAIN_FUNCTIONAL_ASSESSMENT: 0-10

## 2025-07-08 ENCOUNTER — HOSPITAL ENCOUNTER (EMERGENCY)
Age: 70
Discharge: HOME | End: 2025-07-08
Attending: EMERGENCY MEDICINE
Payer: MEDICARE

## 2025-07-08 ENCOUNTER — APPOINTMENT (OUTPATIENT)
Dept: CT IMAGING | Age: 70
End: 2025-07-08
Payer: MEDICARE

## 2025-07-08 VITALS
HEIGHT: 65 IN | HEART RATE: 77 BPM | DIASTOLIC BLOOD PRESSURE: 67 MMHG | TEMPERATURE: 98 F | SYSTOLIC BLOOD PRESSURE: 116 MMHG | WEIGHT: 111.6 LBS | OXYGEN SATURATION: 96 % | RESPIRATION RATE: 20 BRPM | BODY MASS INDEX: 18.59 KG/M2

## 2025-07-08 DIAGNOSIS — R11.2 NAUSEA AND VOMITING, UNSPECIFIED VOMITING TYPE: ICD-10-CM

## 2025-07-08 DIAGNOSIS — A08.4 VIRAL GASTROENTERITIS: Primary | ICD-10-CM

## 2025-07-08 DIAGNOSIS — N30.00 ACUTE CYSTITIS WITHOUT HEMATURIA: ICD-10-CM

## 2025-07-08 LAB
ALBUMIN SERPL-MCNC: 5.2 G/DL (ref 3.4–5)
ALBUMIN/GLOB SERPL: 2.3 {RATIO} (ref 1.1–2.2)
ALP SERPL-CCNC: 120 U/L (ref 40–129)
ALT SERPL-CCNC: 18 U/L (ref 10–40)
ANION GAP SERPL CALCULATED.3IONS-SCNC: 16 MMOL/L (ref 3–16)
AST SERPL-CCNC: 25 U/L (ref 15–37)
BASOPHILS # BLD: 0.1 K/UL (ref 0–0.2)
BASOPHILS NFR BLD: 0.8 %
BILIRUB SERPL-MCNC: 0.5 MG/DL (ref 0–1)
BILIRUB UR QL STRIP.AUTO: ABNORMAL
BUN SERPL-MCNC: 17 MG/DL (ref 7–20)
CALCIUM SERPL-MCNC: 10.4 MG/DL (ref 8.3–10.6)
CHLORIDE SERPL-SCNC: 101 MMOL/L (ref 99–110)
CLARITY UR: ABNORMAL
CO2 SERPL-SCNC: 22 MMOL/L (ref 21–32)
COLOR UR: YELLOW
CREAT SERPL-MCNC: 1 MG/DL (ref 0.6–1.2)
DEPRECATED RDW RBC AUTO: 14.1 % (ref 12.4–15.4)
EOSINOPHIL # BLD: 0.1 K/UL (ref 0–0.6)
EOSINOPHIL NFR BLD: 0.7 %
EPI CELLS #/AREA URNS HPF: ABNORMAL /HPF (ref 0–5)
GFR SERPLBLD CREATININE-BSD FMLA CKD-EPI: 61 ML/MIN/{1.73_M2}
GLUCOSE SERPL-MCNC: 138 MG/DL (ref 70–99)
GLUCOSE UR STRIP.AUTO-MCNC: NEGATIVE MG/DL
HCT VFR BLD AUTO: 46.5 % (ref 36–48)
HGB BLD-MCNC: 15.4 G/DL (ref 12–16)
HGB UR QL STRIP.AUTO: NEGATIVE
KETONES UR STRIP.AUTO-MCNC: ABNORMAL MG/DL
LEUKOCYTE ESTERASE UR QL STRIP.AUTO: ABNORMAL
LIPASE SERPL-CCNC: 12 U/L (ref 13–60)
LYMPHOCYTES # BLD: 2.1 K/UL (ref 1–5.1)
LYMPHOCYTES NFR BLD: 17.9 %
MCH RBC QN AUTO: 29 PG (ref 26–34)
MCHC RBC AUTO-ENTMCNC: 33.2 G/DL (ref 31–36)
MCV RBC AUTO: 87.3 FL (ref 80–100)
MONOCYTES # BLD: 1 K/UL (ref 0–1.3)
MONOCYTES NFR BLD: 8.7 %
MUCOUS THREADS #/AREA URNS LPF: ABNORMAL /LPF
NEUTROPHILS # BLD: 8.6 K/UL (ref 1.7–7.7)
NEUTROPHILS NFR BLD: 71.9 %
NITRITE UR QL STRIP.AUTO: NEGATIVE
PH UR STRIP.AUTO: 6 [PH] (ref 5–8)
PLATELET # BLD AUTO: 364 K/UL (ref 135–450)
PMV BLD AUTO: 7.4 FL (ref 5–10.5)
POTASSIUM SERPL-SCNC: 3.7 MMOL/L (ref 3.5–5.1)
PROT SERPL-MCNC: 7.5 G/DL (ref 6.4–8.2)
PROT UR STRIP.AUTO-MCNC: 30 MG/DL
RBC # BLD AUTO: 5.33 M/UL (ref 4–5.2)
RBC #/AREA URNS HPF: ABNORMAL /HPF (ref 0–4)
SODIUM SERPL-SCNC: 139 MMOL/L (ref 136–145)
SP GR UR STRIP.AUTO: 1.02 (ref 1–1.03)
UA COMPLETE W REFLEX CULTURE PNL UR: YES
UA DIPSTICK W REFLEX MICRO PNL UR: YES
URN SPEC COLLECT METH UR: ABNORMAL
UROBILINOGEN UR STRIP-ACNC: 1 E.U./DL
WBC # BLD AUTO: 11.9 K/UL (ref 4–11)
WBC #/AREA URNS HPF: ABNORMAL /HPF (ref 0–5)

## 2025-07-08 PROCEDURE — 99285 EMERGENCY DEPT VISIT HI MDM: CPT

## 2025-07-08 PROCEDURE — 96361 HYDRATE IV INFUSION ADD-ON: CPT

## 2025-07-08 PROCEDURE — 74177 CT ABD & PELVIS W/CONTRAST: CPT

## 2025-07-08 PROCEDURE — 85025 COMPLETE CBC W/AUTO DIFF WBC: CPT

## 2025-07-08 PROCEDURE — 80053 COMPREHEN METABOLIC PANEL: CPT

## 2025-07-08 PROCEDURE — 96375 TX/PRO/DX INJ NEW DRUG ADDON: CPT

## 2025-07-08 PROCEDURE — 2500000003 HC RX 250 WO HCPCS: Performed by: STUDENT IN AN ORGANIZED HEALTH CARE EDUCATION/TRAINING PROGRAM

## 2025-07-08 PROCEDURE — 2580000003 HC RX 258: Performed by: STUDENT IN AN ORGANIZED HEALTH CARE EDUCATION/TRAINING PROGRAM

## 2025-07-08 PROCEDURE — 81001 URINALYSIS AUTO W/SCOPE: CPT

## 2025-07-08 PROCEDURE — 96374 THER/PROPH/DIAG INJ IV PUSH: CPT

## 2025-07-08 PROCEDURE — 83690 ASSAY OF LIPASE: CPT

## 2025-07-08 PROCEDURE — 6370000000 HC RX 637 (ALT 250 FOR IP): Performed by: STUDENT IN AN ORGANIZED HEALTH CARE EDUCATION/TRAINING PROGRAM

## 2025-07-08 PROCEDURE — 6360000004 HC RX CONTRAST MEDICATION: Performed by: STUDENT IN AN ORGANIZED HEALTH CARE EDUCATION/TRAINING PROGRAM

## 2025-07-08 PROCEDURE — 6360000002 HC RX W HCPCS: Performed by: STUDENT IN AN ORGANIZED HEALTH CARE EDUCATION/TRAINING PROGRAM

## 2025-07-08 PROCEDURE — 87086 URINE CULTURE/COLONY COUNT: CPT

## 2025-07-08 RX ORDER — DICYCLOMINE HCL 20 MG
20 TABLET ORAL ONCE
Status: COMPLETED | OUTPATIENT
Start: 2025-07-08 | End: 2025-07-08

## 2025-07-08 RX ORDER — ONDANSETRON 4 MG/1
4 TABLET, ORALLY DISINTEGRATING ORAL 3 TIMES DAILY PRN
Qty: 21 TABLET | Refills: 0 | Status: SHIPPED | OUTPATIENT
Start: 2025-07-08

## 2025-07-08 RX ORDER — HYDROCODONE BITARTRATE AND ACETAMINOPHEN 5; 325 MG/1; MG/1
1 TABLET ORAL ONCE
Status: COMPLETED | OUTPATIENT
Start: 2025-07-08 | End: 2025-07-08

## 2025-07-08 RX ORDER — ONDANSETRON 2 MG/ML
4 INJECTION INTRAMUSCULAR; INTRAVENOUS ONCE
Status: COMPLETED | OUTPATIENT
Start: 2025-07-08 | End: 2025-07-08

## 2025-07-08 RX ORDER — DICYCLOMINE HCL 20 MG
20 TABLET ORAL 4 TIMES DAILY
Qty: 40 TABLET | Refills: 0 | Status: SHIPPED | OUTPATIENT
Start: 2025-07-08 | End: 2025-07-18

## 2025-07-08 RX ORDER — CEFDINIR 300 MG/1
300 CAPSULE ORAL 2 TIMES DAILY
Qty: 14 CAPSULE | Refills: 0 | Status: SHIPPED | OUTPATIENT
Start: 2025-07-08 | End: 2025-07-15

## 2025-07-08 RX ORDER — 0.9 % SODIUM CHLORIDE 0.9 %
1000 INTRAVENOUS SOLUTION INTRAVENOUS ONCE
Status: COMPLETED | OUTPATIENT
Start: 2025-07-08 | End: 2025-07-08

## 2025-07-08 RX ORDER — IOPAMIDOL 755 MG/ML
75 INJECTION, SOLUTION INTRAVASCULAR
Status: COMPLETED | OUTPATIENT
Start: 2025-07-08 | End: 2025-07-08

## 2025-07-08 RX ADMIN — HYDROCODONE BITARTRATE AND ACETAMINOPHEN 1 TABLET: 5; 325 TABLET ORAL at 15:13

## 2025-07-08 RX ADMIN — IOPAMIDOL 75 ML: 755 INJECTION, SOLUTION INTRAVENOUS at 13:03

## 2025-07-08 RX ADMIN — SODIUM CHLORIDE 1000 ML: 0.9 INJECTION, SOLUTION INTRAVENOUS at 12:18

## 2025-07-08 RX ADMIN — ONDANSETRON 4 MG: 2 INJECTION, SOLUTION INTRAMUSCULAR; INTRAVENOUS at 12:18

## 2025-07-08 RX ADMIN — DICYCLOMINE HYDROCHLORIDE 20 MG: 20 TABLET ORAL at 12:36

## 2025-07-08 RX ADMIN — FAMOTIDINE 20 MG: 10 INJECTION, SOLUTION INTRAVENOUS at 12:25

## 2025-07-08 RX ADMIN — LIDOCAINE HYDROCHLORIDE: 20 SOLUTION ORAL at 13:27

## 2025-07-08 ASSESSMENT — PAIN DESCRIPTION - ORIENTATION
ORIENTATION: MID

## 2025-07-08 ASSESSMENT — PAIN - FUNCTIONAL ASSESSMENT: PAIN_FUNCTIONAL_ASSESSMENT: 0-10

## 2025-07-08 ASSESSMENT — PAIN DESCRIPTION - LOCATION
LOCATION: ABDOMEN

## 2025-07-08 ASSESSMENT — PAIN DESCRIPTION - DESCRIPTORS
DESCRIPTORS: CRAMPING
DESCRIPTORS: SHARP
DESCRIPTORS: SORE

## 2025-07-08 ASSESSMENT — PAIN SCALES - GENERAL
PAINLEVEL_OUTOF10: 7
PAINLEVEL_OUTOF10: 8
PAINLEVEL_OUTOF10: 5

## 2025-07-08 NOTE — ED NOTES
Patient able to tolerate orally without vomiting, told this RN that she feels better now, abdominal and chest discomfort 2/10 pain score.

## 2025-07-08 NOTE — ED PROVIDER NOTES
I independently examined and evaluated Valery Salcido.    In brief, patient is a 69yoF who presents to the ED for evaluation of vomiting. Focused exam revealed clinically non toxic appearing female with tenderness to palpation over the lower abdomen only. No guarding or rebound present. Discharged home with antibiotics for UTI.     All diagnostic, treatment, and disposition decisions were made by myself in conjunction with the advanced practice provider/resident physician.     I personally saw the patient and performed a substantive portion of the visit including aspects of the medical decision making. I approved management plan and take responsibility for the patient management.     I personally saw the patient and independently provided 0 minutes of non-concurrent critical care out of the total shared critical care time provided.    Comment: Please note this report has been produced using speech recognition software and may contain errors related to that system including errors in grammar, punctuation, and spelling, as well as words and phrases that may be inappropriate. If there are any questions or concerns please feel free to contact the dictating provider for clarification.    For all further details of the patient's emergency department visit, please see the advanced practice provider's documentation.        Denita Bond MD  07/09/25 6937

## 2025-07-08 NOTE — DISCHARGE INSTRUCTIONS
I have sent nausea medication, belly pain medication, and an antibiotic to treat your urinary tract infection found in the emergency department today.  Please follow-up with primary care doctor like discussed for a referral to gynecology or urology

## 2025-07-08 NOTE — ED PROVIDER NOTES
[facility] EMERGENCY DEPARTMENT  EMERGENCY DEPARTMENT ENCOUNTER        Pt Name: Valery Salcido  MRN: [id number]  Birthdate 1955  Date of evaluation: 7/8/2025  Provider: Marj Bloom PA-C  PCP: Vivian Jay MD  Note Started: 11:43 AM EDT 7/8/25       I have seen and evaluated this patient with my supervising physician Dr. Denita Bond      CHIEF COMPLAINT       Chief Complaint   Patient presents with    Vomiting     N/V for 4 days. Denies blood in vomit, fever, CP, SOB.        HISTORY OF PRESENT ILLNESS: 1 or more Elements     History From: Patient    Limitations to history : None    Social Determinants Significantly Affecting Health : None    Chief Complaint: Nausea and vomiting    Valery Salcido is a 69 y.o. female who presents to the emergency department for evaluation of nausea and vomiting.  She has a past medical history of COPD, hypertension, and is a former smoker.  She states her symptoms started on July 4, 2025.  She reports just prior to symptom onset she ate pork fried rice.  She reports diffuse abdominal cramping that is worse with retching.  She denies hematemesis or coffee-ground emesis.  She does report that her urine has been dark.  She reports feeling feverish but denies chills.  Previous abdominal surgeries include a cholecystectomy and a total hysterectomy.  She reports having diarrhea but that has subsided.  No hematochezia or melena.  She denies chest pain or shortness of breath.  She has been unable to keep food or drink down since the onset of symptoms.    Nursing Notes were all reviewed and agreed with or any disagreements were addressed in the HPI.    REVIEW OF SYSTEMS :      Review of Systems    Positives and Pertinent negatives as per HPI.     SURGICAL HISTORY     Past Surgical History:   Procedure Laterality Date    BRONCHOSCOPY N/A 5/16/2024    BRONCHOSCOPY DIAGNOSTIC OR CELL WASH ONLY performed by Olivier Ellis MD at [facility] ENDOSCOPY    CHOLECYSTECTOMY

## 2025-07-08 NOTE — ED NOTES
Patient came back from C scan, told this RN that she felt a burning sensation in her chest right now, informed Merle BRYAN.

## 2025-07-08 NOTE — ED NOTES
Discharge with instructions given, verbalized understanding. Assisted in wheelchair to lobby, left stable with all her belongings with her.

## 2025-07-09 LAB — BACTERIA UR CULT: NORMAL

## (undated) DEVICE — TUBING, SUCTION, 3/16" X 6', STRAIGHT: Brand: MEDLINE

## (undated) DEVICE — SPONGE LAP W18XL18IN WHT COT 4 PLY FLD STRUNG RADPQ DISP ST

## (undated) DEVICE — PACK EXTREMITY XR

## (undated) DEVICE — STOCKINETTE,IMPERVIOUS,12X48,STERILE: Brand: MEDLINE

## (undated) DEVICE — BOWL MED M 16OZ PLAS CAP GRAD

## (undated) DEVICE — ESMARK: Brand: DEROYAL

## (undated) DEVICE — ZIMMER® STERILE DISPOSABLE TOURNIQUET CUFF WITH PLC, DUAL PORT, SINGLE BLADDER, 30 IN. (76 CM)

## (undated) DEVICE — CHLORAPREP 26ML ORANGE

## (undated) DEVICE — ELECTRODE,ECG,STRESS,FOAM,3PK: Brand: MEDLINE

## (undated) DEVICE — SYRINGE MED 10ML POLYPR LUERSLIP TIP FLAT TOP W/O SFTY DISP

## (undated) DEVICE — 3M™ COBAN™ NL STERILE NON-LATEX SELF-ADHERENT WRAP, 2086S, 6 IN X 5 YD (15 CM X 4,5 M), 12 ROLLS/CASE: Brand: 3M™ COBAN™

## (undated) DEVICE — ELECTRODE PT RET AD L9FT HI MOIST COND ADH HYDRGEL CORDED

## (undated) DEVICE — IMMOBILIZER KNEE L20IN AD 1 SZ FIT MOST UNIV WRP ARND OPN

## (undated) DEVICE — 1010 S-DRAPE TOWEL DRAPE 10/BX: Brand: STERI-DRAPE™

## (undated) DEVICE — PADDING CAST W6INXL4YD NONSTERILE COT RAYON MICROPLEATED

## (undated) DEVICE — YANKAUER,BULB TIP,W/O VENT,RIGID,STERILE: Brand: MEDLINE

## (undated) DEVICE — DRILL BIT NON-LOCKING, SHORT: Brand: AXSOS

## (undated) DEVICE — SYRINGE MED 10ML SLIP TIP BLNT FILL AND LUERLOCK DISP

## (undated) DEVICE — CONMED SCOPE SAVER BITE BLOCK, 20X27 MM: Brand: SCOPE SAVER

## (undated) DEVICE — Z DISCONTINUED PER MEDLINE USE 2752023 DRESSING FOAM W7.62XL7.62CM SIL ADH WTRPRF FLM BK SQ SHP

## (undated) DEVICE — TUBING, SUCTION, 3/16" X 12', STRAIGHT: Brand: MEDLINE

## (undated) DEVICE — SYRINGE, LUER SLIP, STERILE, 60ML: Brand: MEDLINE

## (undated) DEVICE — K-WIRE DRILL TIP: Brand: AXSOS

## (undated) DEVICE — TOWEL OR BLUEE 16X26IN ST 8 PACK ORB08 16X26ORTWL

## (undated) DEVICE — CANNULA,OXY,ADULT,SUPERSOFT,W/7'TUB,SC: Brand: MEDLINE INDUSTRIES, INC.

## (undated) DEVICE — SINGLE USE SUCTION VALVE MAJ-209: Brand: SINGLE USE SUCTION VALVE (STERILE)

## (undated) DEVICE — NEEDLE EPI 18GA L3.5IN S STL MOD TUOHY PNT THN WALL W/O WNG

## (undated) DEVICE — GLOVE ORANGE PI 7   MSG9070

## (undated) DEVICE — TRAP,MUCUS SPECIMEN, 80CC: Brand: MEDLINE

## (undated) DEVICE — KIT COLON W/ 1.1OZ LUB AND 2 END

## (undated) DEVICE — SUTURE VCRL + SZ 0 L27IN ABSRB UD L36MM CT-1 1/2 CIR VCPP41D

## (undated) DEVICE — DRAPE,REIN 53X77,STERILE: Brand: MEDLINE

## (undated) DEVICE — CONTAINER,SPEC,PNEUM TUBE,3OZ,STRL PATH: Brand: MEDLINE

## (undated) DEVICE — SINGLE USE BIOPSY VALVE MAJ-210: Brand: SINGLE USE BIOPSY VALVE (STERILE)

## (undated) DEVICE — GLOVE ORANGE PI 7 1/2   MSG9075

## (undated) DEVICE — DRAPE C ARM UNIV W41XL74IN CLR PLAS XR VELC CLSR POLY STRP

## (undated) DEVICE — SYSTEM SKIN CLSR 22CM DERMBND PRINEO

## (undated) DEVICE — 3M™ TEGADERM™ TRANSPARENT FILM DRESSING FRAME STYLE, 1626W, 4 IN X 4-3/4 IN (10 CM X 12 CM), 50/CT 4CT/CASE: Brand: 3M™ TEGADERM™

## (undated) DEVICE — SUTURE ABSORBABLE BRAIDED 2-0 CT-1 27 IN UD VICRYL J259H

## (undated) DEVICE — DRILL BIT LOCKING, SHORT: Brand: AXSOS

## (undated) DEVICE — SUTURE MCRYL + SZ 4-0 L18IN ABSRB UD L19MM PS-2 3/8 CIR MCP496G

## (undated) DEVICE — Z CONVERTED USE 2273232 BANDAGE COMPR W6INXL11YD E KNIT DBL SELF CLSR EZE-BAND

## (undated) DEVICE — DRESSING FOAM W4XL12IN SIL RECT ADH WTRPRF FLM BK W/ BORD

## (undated) DEVICE — 35 ML SYRINGE LUER-LOCK TIP: Brand: MONOJECT

## (undated) DEVICE — MAJOR SET UP PK

## (undated) DEVICE — GAUZE,SPONGE,4"X4",8PLY,STRL,LF,10/TRAY: Brand: MEDLINE

## (undated) DEVICE — JP 3-SPRING RES W/10FR PVC DRAIN/TR: Brand: CARDINAL HEALTH

## (undated) DEVICE — GOWN ISOLATN REGULAR L BLU POLYPR POLY OVR HD NK OPN BK

## (undated) DEVICE — SMARTGOWN SURGICAL GOWN, XL: Brand: CONVERTORS

## (undated) DEVICE — PADDING UNDERCAST W6INXL4YD WYTEX 6 PER BG